# Patient Record
Sex: MALE | Race: WHITE
[De-identification: names, ages, dates, MRNs, and addresses within clinical notes are randomized per-mention and may not be internally consistent; named-entity substitution may affect disease eponyms.]

---

## 2019-02-08 ENCOUNTER — HOSPITAL ENCOUNTER (INPATIENT)
Dept: HOSPITAL 76 - ED | Age: 84
LOS: 3 days | Discharge: HOME | DRG: 390 | End: 2019-02-11
Attending: NURSE PRACTITIONER | Admitting: NURSE PRACTITIONER
Payer: MEDICARE

## 2019-02-08 DIAGNOSIS — K56.600: Primary | ICD-10-CM

## 2019-02-08 DIAGNOSIS — Z85.72: ICD-10-CM

## 2019-02-08 DIAGNOSIS — E87.6: ICD-10-CM

## 2019-02-08 DIAGNOSIS — K30: ICD-10-CM

## 2019-02-08 DIAGNOSIS — Z92.21: ICD-10-CM

## 2019-02-08 DIAGNOSIS — I10: ICD-10-CM

## 2019-02-08 DIAGNOSIS — E86.0: ICD-10-CM

## 2019-02-08 DIAGNOSIS — Z90.49: ICD-10-CM

## 2019-02-08 DIAGNOSIS — Z87.891: ICD-10-CM

## 2019-02-08 LAB
ALBUMIN DIAFP-MCNC: 3.7 G/DL (ref 3.2–5.5)
ALBUMIN/GLOB SERPL: 1.1 {RATIO} (ref 1–2.2)
ALP SERPL-CCNC: 76 IU/L (ref 42–121)
ALT SERPL W P-5'-P-CCNC: 16 IU/L (ref 10–60)
ANION GAP SERPL CALCULATED.4IONS-SCNC: 13 MMOL/L (ref 6–13)
AST SERPL W P-5'-P-CCNC: 25 IU/L (ref 10–42)
BASOPHILS NFR BLD AUTO: 0 10^3/UL (ref 0–0.1)
BASOPHILS NFR BLD AUTO: 0.4 %
BILIRUB BLD-MCNC: 0.6 MG/DL (ref 0.2–1)
BUN SERPL-MCNC: 22 MG/DL (ref 6–20)
CALCIUM UR-MCNC: 8.6 MG/DL (ref 8.5–10.3)
CHLORIDE SERPL-SCNC: 105 MMOL/L (ref 101–111)
CLARITY UR REFRACT.AUTO: CLEAR
CO2 SERPL-SCNC: 19 MMOL/L (ref 21–32)
CREAT SERPLBLD-SCNC: 0.9 MG/DL (ref 0.6–1.2)
EOSINOPHIL # BLD AUTO: 0 10^3/UL (ref 0–0.7)
EOSINOPHIL NFR BLD AUTO: 0.2 %
ERYTHROCYTE [DISTWIDTH] IN BLOOD BY AUTOMATED COUNT: 14.1 % (ref 12–15)
GFRSERPLBLD MDRD-ARVRAT: 81 ML/MIN/{1.73_M2} (ref 89–?)
GLOBULIN SER-MCNC: 3.5 G/DL (ref 2.1–4.2)
GLUCOSE SERPL-MCNC: 124 MG/DL (ref 70–100)
GLUCOSE UR QL STRIP.AUTO: NEGATIVE MG/DL
HGB UR QL STRIP: 13.6 G/DL (ref 14–18)
KETONES UR QL STRIP.AUTO: NEGATIVE MG/DL
LIPASE SERPL-CCNC: 18 U/L (ref 22–51)
LYMPHOCYTES # SPEC AUTO: 1 10^3/UL (ref 1.5–3.5)
LYMPHOCYTES NFR BLD AUTO: 11.2 %
MCH RBC QN AUTO: 31.5 PG (ref 27–31)
MCHC RBC AUTO-ENTMCNC: 33.6 G/DL (ref 32–36)
MCV RBC AUTO: 93.9 FL (ref 80–94)
MONOCYTES # BLD AUTO: 0.8 10^3/UL (ref 0–1)
MONOCYTES NFR BLD AUTO: 9.2 %
NEUTROPHILS # BLD AUTO: 7.1 10^3/UL (ref 1.5–6.6)
NEUTROPHILS # SNV AUTO: 9 X10^3/UL (ref 4.8–10.8)
NEUTROPHILS NFR BLD AUTO: 79 %
NITRITE UR QL STRIP.AUTO: NEGATIVE
PDW BLD AUTO: 6.9 FL (ref 7.4–11.4)
PH UR STRIP.AUTO: 5.5 PH (ref 5–7.5)
PLATELET # BLD: 318 10^3/UL (ref 130–450)
PROT SPEC-MCNC: 7.2 G/DL (ref 6.7–8.2)
PROT UR STRIP.AUTO-MCNC: NEGATIVE MG/DL
RBC # UR STRIP.AUTO: NEGATIVE /UL
RBC MAR: 4.3 10^6/UL (ref 4.7–6.1)
SODIUM SERPLBLD-SCNC: 137 MMOL/L (ref 135–145)
SP GR UR STRIP.AUTO: 1.01 (ref 1–1.03)
UROBILINOGEN UR QL STRIP.AUTO: (no result) E.U./DL
UROBILINOGEN UR STRIP.AUTO-MCNC: NEGATIVE MG/DL

## 2019-02-08 PROCEDURE — 80048 BASIC METABOLIC PNL TOTAL CA: CPT

## 2019-02-08 PROCEDURE — 83735 ASSAY OF MAGNESIUM: CPT

## 2019-02-08 PROCEDURE — 74177 CT ABD & PELVIS W/CONTRAST: CPT

## 2019-02-08 PROCEDURE — 83690 ASSAY OF LIPASE: CPT

## 2019-02-08 PROCEDURE — 74018 RADEX ABDOMEN 1 VIEW: CPT

## 2019-02-08 PROCEDURE — 96366 THER/PROPH/DIAG IV INF ADDON: CPT

## 2019-02-08 PROCEDURE — 99284 EMERGENCY DEPT VISIT MOD MDM: CPT

## 2019-02-08 PROCEDURE — 80053 COMPREHEN METABOLIC PANEL: CPT

## 2019-02-08 PROCEDURE — 87086 URINE CULTURE/COLONY COUNT: CPT

## 2019-02-08 PROCEDURE — 85025 COMPLETE CBC W/AUTO DIFF WBC: CPT

## 2019-02-08 PROCEDURE — 81001 URINALYSIS AUTO W/SCOPE: CPT

## 2019-02-08 PROCEDURE — 85027 COMPLETE CBC AUTOMATED: CPT

## 2019-02-08 PROCEDURE — 96376 TX/PRO/DX INJ SAME DRUG ADON: CPT

## 2019-02-08 PROCEDURE — 99283 EMERGENCY DEPT VISIT LOW MDM: CPT

## 2019-02-08 PROCEDURE — 36415 COLL VENOUS BLD VENIPUNCTURE: CPT

## 2019-02-08 PROCEDURE — 71046 X-RAY EXAM CHEST 2 VIEWS: CPT

## 2019-02-08 PROCEDURE — 81003 URINALYSIS AUTO W/O SCOPE: CPT

## 2019-02-08 PROCEDURE — 96375 TX/PRO/DX INJ NEW DRUG ADDON: CPT

## 2019-02-08 PROCEDURE — 84132 ASSAY OF SERUM POTASSIUM: CPT

## 2019-02-08 PROCEDURE — 96365 THER/PROPH/DIAG IV INF INIT: CPT

## 2019-02-08 PROCEDURE — 84100 ASSAY OF PHOSPHORUS: CPT

## 2019-02-08 RX ADMIN — HEPARIN SODIUM SCH UNIT: 5000 INJECTION, SOLUTION INTRAVENOUS; SUBCUTANEOUS at 21:24

## 2019-02-08 RX ADMIN — DIATRIZOATE MEGLUMINE AND DIATRIZOATE SODIUM ONE ML: 660; 100 LIQUID ORAL; RECTAL at 16:43

## 2019-02-08 RX ADMIN — SODIUM CHLORIDE AND POTASSIUM CHLORIDE SCH MLS/HR: 9; 1.49 INJECTION, SOLUTION INTRAVENOUS at 16:56

## 2019-02-08 RX ADMIN — DIATRIZOATE MEGLUMINE AND DIATRIZOATE SODIUM ONE ML: 660; 100 LIQUID ORAL; RECTAL at 16:44

## 2019-02-08 RX ADMIN — SODIUM CHLORIDE, PRESERVATIVE FREE SCH ML: 5 INJECTION INTRAVENOUS at 16:56

## 2019-02-08 RX ADMIN — ONDANSETRON PRN MG: 2 INJECTION INTRAMUSCULAR; INTRAVENOUS at 20:28

## 2019-02-08 RX ADMIN — HYDROMORPHONE HYDROCHLORIDE PRN MG: 1 INJECTION, SOLUTION INTRAMUSCULAR; INTRAVENOUS; SUBCUTANEOUS at 18:11

## 2019-02-08 NOTE — HISTORY & PHYSICAL EXAMINATION
Chief Complaint





- Chief Complaint


Chief Complaint: abdominal pain





History of Present Illness





- Admitted From


Admitted From:: Home





- History Obtained From


Records Reviewed: Yes in [facility]


History obtained from: Patient and medical recods


Exam Limitations: No





- History of Present Illness


HPI Comment/Other: 


Mr Iqbal is a 84 yo male with a past medical history of history of stage 4 

diffuse large B-cell lymphoma diagnosed in 2012 after he presented with a small 

bowel obstruction and required right colectomy including resection of his 

terminal ileum where the tumor was apparently located. He then receive 6 cycles 

of chemotherapy. His latest CT scan for surveillance in 2014 showed no evidence 

of disease. He also has a history of hypertension and hypokalemia. He has not 

had any hospitalizations or ER visits for obstructions after surgery. He drove 

himself to the ER today with complaints over the last 10 days to 2 weeks of 

abdominal cramps. He denies nausea. He reports passing a little gas yesterday 

and he had a small bowel movement today. Last normal bowel movement was 

yesterday. He denies melena or BRBPR. He states that he is hungry. CT abd/pelvis

in the ER shows markedly dilated small bowel with air fluid levels and a  

decompressed large bowel distal to his anastamosis. There is no evidence of 

recurrent cancer. 





He has noted wt loss of approximately 10 lbs over the last two years. He denies 

fevers or chills, chest pain, or urinary symptoms. He does endorse dyspnea on 

exertion recently, specially 2-3 days ago when he was helping his daughter feed 

some of the animals on their properties. He denies orthopnea or dyspnea at rest.





He is being admitted to the hospital for observation to include bowel rest, IV 

fluids, potassium repletion, and undergo a gastrografin challenge per the 

general surgeon.





History





- Past Medical History


Cardiovascular: reports: Hypertension


Respiratory: reports: None


Neuro: reports: None


Endocrine/Autoimmune: reports: None


GI: reports: Other (lymphoma involving terminal ileum 2012)


: reports: None


Psych: reports: Depression


Musculoskeletal: reports: None


MRSA Hx?: No





- Past Surgical History


General: reports: Bowel surgery (right colectomy), Colonoscopy





- Family & Social History


Living arrangement: At home


Living Situation: With family


Social History Notes: Patient lives with his daughter and her . He is 

independent at home and still drives. He is  and has two daughters with 

multiple grandchildren and great-grandchildren. 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





- Substance History


Use: Uses substance without health or social issues: Tobacco (x 65 yrs; quit 2 

weeks ago.), Cannabis (2-3 times per day)


Tobacco Details: Cigarettes





- POLST


Patient has POLST: No





Meds/Allgy





- Home Medications


Home Medications: 


                                Ambulatory Orders











 Medication  Instructions  Recorded  Confirmed


 


Aspirin 81 mg PO DAILY 07/10/13 05/07/18


 


Multivitamin [Multivitamins] 1 each PO DAILY 07/10/13 05/07/18


 


hydroCHLOROthiazide 12.5 mg PO DAILY 05/30/14 05/07/18





[Hydrochlorothiazide]   


 


Potassium Chloride [Micro-K] 10 meq PO DAILY 10/27/14 05/07/18














- Allergies


Allergies/Adverse Reactions: 


                                    Allergies











Allergy/AdvReac Type Severity Reaction Status Date / Time


 


No Known Drug Allergies Allergy   Verified 02/08/19 10:24














Review of Systems





- Constitutional


Constitutional: reports: Weight loss (10 pound weight loss over the last 2 year

s, no recent weight loss.).  denies: Weakness, Night sweats





- Eyes


Eyes: reports: Corrective lenses





- Ears, Nose & Throat


Ears, Nose & Throat: reports: Dentures





- Cardiovascular


Cariovascular: denies: Irregular heart rate, Palpitations, Chest pain





- Respiratory


Respiratory: reports: SOB with exertion.  denies: Cough, Sputum production, 

Wheezing, SOB at rest





- Gastrointestinal


Gastrointestinal: reports: Abdominal pain, Abdominal distention, Constipation, 

Change in bowel habits.  denies: Nausea, Vomiting





- Genitourinary


Genitourinary: denies: Dysuria, Frequency, Urgency





- Musculoskeletal


Musculoskeletal: denies: Muscle pain, Back pain





- Neurological


Neurological: denies: General weakness, Headache, Memory problems





- Psychiatric


Psychiatric: denies: Depression, Anxiety





- All Other Systems


All Other Systems: reports: Reviewed and negative


Prior Level of Functionality: 


Patient is indepedent.





Exam





- Vital Signs


Reviewed Vital Signs: Yes


Vital Signs: 





                                Vital Signs x48h











  Temp Pulse Pulse Resp BP BP Pulse Ox


 


 02/08/19 16:00  36.4 C L   71  18   121/104 H  100


 


 02/08/19 15:01   67   18  109/75   99


 


 02/08/19 14:09   69   17  159/95 H   100


 


 02/08/19 12:30   89   18  120/68   99


 


 02/08/19 10:35     18   


 


 02/08/19 10:21  36.7 C  98   20  117/80   99














- Physical Exam


General Appearance: positive: No acute distress, Alert


Eyes Bilateral: positive: Normal inspection, Other (Wears glasses.)


ENT: positive: ENT inspection nml, Pharynx nml, Dry mucous membranes, Other 

(Upper and lower dentures in place)


Neck: positive: Nml inspection, No JVD


Respiratory: positive: No respiratory distress, Breath sounds nml


Cardiovascular: positive: Regular rate & rhythm, No murmur


Peripheral Pulses: positive: 2+


Abdomen: positive: Other (Abdomen is distended, tender to palpation, bowel 

sounds are hypoactive).  negative: Guarding, Rebound


Skin: positive: Warm, Dry, Other (He has a bandaid to right temporal region. 

States a horse kicked him.)


Extremities: positive: Non-tender, Full ROM, No pedal edema


Neurologic/Psychiatric: positive: Oriented x3, CN's nml (2-12), Motor nml, 

Sensation nml





Conclusion/Plan





- Problem List


(1) Partial small bowel obstruction


Conclusion/Plan: 


Patient is not nauseous at this time. In fact, he reports that he is hungry. 

General surgery has been consulted.


Plan: 


admit for observation 


NPO with bowel rest


hydration with IV fluids


gastrografin challenge per general surgeon. follow-up xrays have been ordered.


if patient develops n/v, will place NGT for decompression


await return of bowel function








(2) Dehydration


Conclusion/Plan: 


Secondary to above. BUN 22. 


Plan: 


continue IVF. 


obtain BMP in the morning.








(3) Hypokalemia


Conclusion/Plan: 


Patient with h/o hypokalemia on supplementation at home. K 2.9 today. He 

received 10 mEq in the ER.


Plan: 


replete potassium. 


add potassium to IV fluids. 


obtain BMP in the morning.








(4) Hypertension


Conclusion/Plan: 


Patient on HCTZ at home, although he reports not taking his medication for some 

time. 


Plan: 


monitor blood pressure. 


hydralazine IV PRN ordered for SBP >160 since he is currently NPO.


Qualifiers: 


   Hypertension type: essential hypertension   Qualified Code(s): I10 - 

Essential (primary) hypertension   





(5) Full code status


Conclusion/Plan: 


Patient wishes to be a full code.








- Lab Results


Lab results reviewed: Yes


Fish Bones: 


                                 02/08/19 08:43





                                 02/08/19 08:43





- Diagnostic Imaging Results


Diagnostic Imaging Results: positive: Final report reviewed (CT scan abdomen/pe

lvis 2/8/2019 -- Small bowel obstruction to the level of the surgical 

anastomotic staple line right lower quadrant, presumably a large bowel to small 

bowel anastomosis.)





Core Measures





- Anticipated LOS


I expect patient to be DC'd or transferred within 96 hours.: Yes





- DVT/VTE - Prophylaxis


VTE/DVT Prophylaxis med ordered at admit?: Yes

## 2019-02-08 NOTE — ED PHYSICIAN DOCUMENTATION
PD HPI ABD PAIN





- Stated complaint


Stated Complaint: STOMACH PAIN





- Chief complaint


Chief Complaint: Abd Pain





- History obtained from


History obtained from: Patient





- History of Present Illness


Timing - duration: Days (4)


Timing - details: Waxing and waning


Quality: Pain


Location: Periumbilical


Similar symptoms before: Diagnosis (Bowel obstruction)





- Additional information


Additional information: 





The patient is an 83-year-old male who presents with upper abdominal pain that 

he has had intermittently for the past 2 or 3 weeks, but worse over the past 4 

days.  He has been belching, but denies nausea or vomiting.  He denies fever, 

diarrhea, or dysuria.  He has been having bowel movements.  He denies chest 

pain, cough, or shortness of breath.  His past medical history is significant 

for stage IV diffuse large B-cell lymphoma.  He underwent emergent bowel surgery

for bowel obstruction years ago.  He reports that his pain today feels similar 

to the pain he experienced prior to the bowel surgery.


Review of his medical record reveals that he received chemotherapy in 2012.  He 

underwent CT scan in April 2014 that was negative for recurrent lymphoma.





Review of Systems


Constitutional: denies: Fever


Ears: denies: Tinnitus/ringing


Nose: denies: Congestion


Throat: denies: Sore throat


Cardiac: denies: Chest pain / pressure


Respiratory: denies: Dyspnea, Cough


GI: reports: Abdominal Pain.  denies: Nausea, Vomiting, Diarrhea


: denies: Dysuria


Skin: denies: Rash


Musculoskeletal: denies: Back pain, Extremity swelling


Neurologic: denies: Focal weakness, Numbness, Headache





PD PAST MEDICAL HISTORY





- Past Medical History


Cardiovascular: Hypertension


Respiratory: None


Endocrine/Autoimmune: None


GI: Other


: None


Musculoskeletal: None





- Past Surgical History


Past Surgical History: Yes


General: Bowel surgery, Colonoscopy





- Present Medications


Home Medications: 


                                Ambulatory Orders











 Medication  Instructions  Recorded  Confirmed


 


Aspirin 81 mg PO DAILY 07/10/13 05/07/18


 


Multivitamin [Multivitamins] 1 each PO DAILY 07/10/13 05/07/18


 


hydroCHLOROthiazide 12.5 mg PO DAILY 05/30/14 05/07/18





[Hydrochlorothiazide]   


 


Potassium Chloride [Micro-K] 10 meq PO DAILY 10/27/14 05/07/18














- Allergies


Allergies/Adverse Reactions: 


                                    Allergies











Allergy/AdvReac Type Severity Reaction Status Date / Time


 


No Known Drug Allergies Allergy   Verified 02/08/19 10:24














- Social History


Does the pt smoke?: Yes


Smoking Status: Former smoker


Does the pt drink ETOH?: No


Does the pt have substance abuse?: No





- Immunizations


Immunizations are current?: No





PD ED PE NORMAL





- Vitals


Vital signs reviewed: Yes (Normal)





- General


General: Alert and oriented X 3, Well developed/nourished





- HEENT


HEENT: Atraumatic, Pharynx benign, Other (Slightly dry mucous membranes.)





- Neck


Neck: Supple, no meningeal sign, No adenopathy, No JVD





- Cardiac


Cardiac: RRR





- Respiratory


Respiratory: No respiratory distress, Clear bilaterally





- Abdomen


Abdomen: Soft, Other (Decreased bowel tones, distended abdomen that is soft, 

without focal tenderness or rebound.)





- Back


Back: No CVA TTP





- Derm


Derm: No rash





- Extremities


Extremities: No edema, No calf tenderness / cord





- Neuro


Neuro: Alert and oriented X 3, No motor deficit, Normal speech





Results





- Vitals


Vitals: 


                               Vital Signs - 24 hr











  02/08/19 02/08/19 02/08/19





  10:21 10:35 12:30


 


Temperature 36.7 C  


 


Heart Rate 98  89


 


Respiratory 20 18 18





Rate   


 


Blood Pressure 117/80  120/68


 


O2 Saturation 99  99














  02/08/19 02/08/19





  14:09 15:01


 


Temperature  


 


Heart Rate 69 67


 


Respiratory 17 18





Rate  


 


Blood Pressure 159/95 H 109/75


 


O2 Saturation 100 99








                                     Oxygen











O2 Source                      Room air

















- Labs


Labs: 


                                Laboratory Tests











  02/08/19 02/08/19 02/08/19





  08:43 08:43 12:28


 


WBC  9.0  


 


RBC  4.30 L  


 


Hgb  13.6 L  


 


Hct  40.4 L  


 


MCV  93.9  


 


MCH  31.5 H  


 


MCHC  33.6  


 


RDW  14.1  


 


Plt Count  318  


 


MPV  6.9 L  


 


Neut # (Auto)  7.1 H  


 


Lymph # (Auto)  1.0 L  


 


Mono # (Auto)  0.8  


 


Eos # (Auto)  0.0  


 


Baso # (Auto)  0.0  


 


Absolute Nucleated RBC  0.00  


 


Nucleated RBC %  0.0  


 


Sodium   137 


 


Potassium   2.9 L 


 


Chloride   105 


 


Carbon Dioxide   19 L 


 


Anion Gap   13.0 


 


BUN   22 H 


 


Creatinine   0.9 


 


Estimated GFR (MDRD)   81 L 


 


Glucose   124 H 


 


Calcium   8.6 


 


Total Bilirubin   0.6 


 


AST   25 


 


ALT   16 


 


Alkaline Phosphatase   76 


 


Total Protein   7.2 


 


Albumin   3.7 


 


Globulin   3.5 


 


Albumin/Globulin Ratio   1.1 


 


Lipase   18 L 


 


Urine Color    YELLOW


 


Urine Clarity    CLEAR


 


Urine pH    5.5


 


Ur Specific Gravity    1.015


 


Urine Protein    NEGATIVE


 


Urine Glucose (UA)    NEGATIVE


 


Urine Ketones    NEGATIVE


 


Urine Occult Blood    NEGATIVE


 


Urine Nitrite    NEGATIVE


 


Urine Bilirubin    NEGATIVE


 


Urine Urobilinogen    1 (NORMAL)


 


Ur Leukocyte Esterase    NEGATIVE


 


Ur Microscopic Review    NOT INDICATED


 


Urine Culture Comments    NOT INDICATED














- Rads (name of study)


  ** CT abd/pelvis


Radiology: Prelim report reviewed, EMP read contemporaneously, See rad report 

(Small bowel obstruction to the level of the surgical anastomotic staple line 

right lower quadrant, presumably a large bowel to small bowel anastomosis.)





PD MEDICAL DECISION MAKING





- ED course


Complexity details: reviewed old records, reviewed results, re-evaluated 

patient, considered differential, d/w patient, d/w consultant


ED course: 





The patient's presentation is consistent with partial small bowel obstruction, 

which is evident on CT scan of the abdomen and pelvis.  He does not appear 

septic, and his white count is normal at 4.9.  Chemistry panel reveals 

hypokalemia with a potassium of 2.9.  Urinalysis is negative.


Treatment in the emergency department included administration of normal saline 

IV, and potassium 10 mEq IV.  He declined pain medication, and did not need 

antiemetic.  I discussed his condition with Dr. Almeida who will consult for 

surgery, but suggested that medicine should be the admitting service.  I 

discussed his condition with Dr. Willard who accepts him for further evaluation 

and treatment.





Departure





- Departure


Disposition: ED Place in Observation


Clinical Impression: 


 Partial small bowel obstruction, Hyperkalemia





Condition: Stable

## 2019-02-08 NOTE — XRAY REPORT
Reason:  hx stage 4 lymphoma, longstanding tobacco usage

Procedure Date:  02/08/2019   

Accession Number:  [id number] / [id number]                    

Procedure:  XR  - Chest 2 View X-Ray CPT Code:  80823

 

FULL RESULT:

 

 

EXAM:

CHEST RADIOGRAPHY

 

EXAM DATE: 2/8/2019 08:00 PM.

 

CLINICAL HISTORY: Lymphoma.

 

COMPARISON: XR CHEST PA AND LAT 05/26/2012 8:01 AM.

 

TECHNIQUE: 2 views.

 

FINDINGS:

Lungs/Pleura: No focal opacities evident. No pleural effusion. No 

pneumothorax. Normal volumes.

 

Mediastinum: Normal heart size. Tortuous aorta.

 

Other: Prominent air-filled bowel with numerous air-fluid levels. Mild 

chronic compression fractures. Old lateral left sixth rib fracture.

IMPRESSION: Normal cardiomediastinal silhouette and clear lungs.

 

RADIA

## 2019-02-08 NOTE — XRAY REPORT
Reason:  sbo, initial film after gastrograffin challenge

Procedure Date:  02/08/2019   

Accession Number:  [id number] / [id number]                    

Procedure:  XR  - Abdomen 1 View X-Ray CPT Code:  75306

 

FULL RESULT:

 

 

EXAM:

ABDOMEN RADIOGRAPHY

 

EXAM DATE: 2/8/2019 04:24 PM.

 

CLINICAL HISTORY: Prior history of right colectomy for bowel lymphoma 

with appearance of small bowel obstruction on CT earlier today. 

Gastrografin challenge.

 

COMPARISON: X-ray abdomen AP (KUB) 05/17/2012 12:44 PM.

Abdomen/pelvis with contrast 02/08/2019 12:14 PM.

 

TECHNIQUE: 1 view. Per protocol, patient was given 100 cc of full 

strength Gastrografin PO followed by immediate KUB.

 

FINDINGS:

Bowel Gas Pattern: Moderate hiatal hernia superimposes behind the heart. 

There is Gastrografin within nondistended stomach and nondistended 

proximal to mid jejunum on the immediate film. There are multiple dilated 

distal small bowel loops which have not yet opacified.

 

Other: There is persistent contrast within the bilateral renal collecting 

systems from recent IV contrast with mild hydronephrosis on the right. 

Persistent contrast within moderately dilated urinary bladder..

 

IMPRESSION:

1. Persistent evidence of abnormal bowel gas pattern with multiple 

dilated loops of small bowel.

2. Immediate Gastrografin challenge shows nondistended stomach and 

proximal mid jejunum.

3. Mildly distended urinary bladder. Mild right-sided hydronephrosis.

 

RADIA

## 2019-02-08 NOTE — CONSULTATION NOTE
Referring Provider


Name of Referring Provider:: Dr. Fox


Consult Date: 02/08/19





Chief Complaint





- Chief Complaint


Chief Complaint: abd pain





History of Present Illness





- Admitted From


Admitted From:: ER





- History Obtained From


Records Reviewed: yes


History obtained from: pt, records


Exam Limitations: none





- History of Present Illness


HPI Comment/Other: 





84 yo male with hx of Stage 4 diffuse large cell lymphoma diagnosed in 2012 when

he presented with a small bowel obstruction and required  right colectomy 

including resection of his terminal ileum where tumor was apparently located. He

then receive 6 cycles of chemotherapy and has been ANNE ever since. He did well 

until approximately 2 weeks ago when he noticed increasing fatigue and RAMESH, 

followed by the onset of crampy periumbilical pains approximately 4 days ago, 

which worsened to the point of him presenting to the ER for evaluation. There 

has been no N/V, fever, chills. He has noted wt loss of approximately 10 lbs 

over the past few years. He reports normal daily bowel movements until today 

when he has noted no passage of gas or stool per rectum. No melena, BRBPR. 

Current sx are similar to those he had in 2012 prior to his partial colectomy. 

He report no unusual oral intake recently. No other prior abdominal surgeries. 

CT abd/pelvis in the ER shows markedly dilated small bowel with A/F levels, with

some gas and stool in nl sized colon, suggestive of high grade partial SBO 

located near surgical anastomosis. There is no evidence of recurrent cancer. Pt 

was admitted and surgical consultation was requested.





History





- Past Medical History


Cardiovascular: reports: Hypertension


Respiratory: reports: None


Endocrine/Autoimmune: reports: None


GI: reports: Other (lymphoma involving terminal ileum 2012)


: reports: None


Musculoskeletal: reports: None


MRSA Hx?: No





- Past Surgical History


General: reports: Bowel surgery (right colectomy), Colonoscopy





- Family & Social History


Living arrangement: At home


Living Situation: With family





- Substance History


Use: Uses substance without health or social issues: Tobacco (x 65 yrs; quit 2 

weeks ago.), Cannabis (2-3 times per day)


Tobacco Details: Cigarettes





Meds/Allgy





- Home Medications


Home Medications: 


                                Ambulatory Orders











 Medication  Instructions  Recorded  Confirmed


 


Aspirin 81 mg PO DAILY 07/10/13 05/07/18


 


Multivitamin [Multivitamins] 1 each PO DAILY 07/10/13 05/07/18


 


hydroCHLOROthiazide 12.5 mg PO DAILY 05/30/14 05/07/18





[Hydrochlorothiazide]   


 


Potassium Chloride [Micro-K] 10 meq PO DAILY 10/27/14 05/07/18














- Allergies


Allergies/Adverse Reactions: 


                                    Allergies











Allergy/AdvReac Type Severity Reaction Status Date / Time


 


No Known Drug Allergies Allergy   Verified 02/08/19 10:24














Review of Systems





- Constitutional


Constitutional: reports: Fatigue, Weakness, Weight loss.  denies: Fever, Chills,

Poor appetite, Diaphoresis, Night sweats





- Gastrointestinal


Gastrointestinal: reports: Abdominal pain, Abdominal distention, Change in bowel

habits, Bloating.  denies: Diarrhea, Rectal bleeding, Black stools, Bloody 

stools, Nausea, Vomiting, Bile emesis, Elijah blood emesis, Coffee grounds 

emesis, Reflux/heartburn, Poor appetite





Exam





- Vital Signs


Reviewed Vital Signs: Yes


Vital Signs: 





                                Vital Signs x48h











  Temp Pulse Resp BP Pulse Ox


 


 02/08/19 15:01   67  18  109/75  99


 


 02/08/19 14:09   69  17  159/95 H  100


 


 02/08/19 12:30   89  18  120/68  99


 


 02/08/19 10:35    18  


 


 02/08/19 10:21  36.7 C  98  20  117/80  99














- Physical Exam


General Appearance: positive: Alert, Mild distress


Eyes Bilateral: positive: No scleral icterus, Other (anisocoria, pt reports due 

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


ENT: positive: Dry mucous membranes


Neck: positive: Nml inspection, No JVD.  negative: Lymphadenopathy (R), 

Lymphadenopathy (L)


Respiratory: positive: Chest non-tender, No respiratory distress, Breath sounds 

nml.  negative: Wheezes, Rales, Rhonchi


Cardiovascular: positive: Regular rate & rhythm, No murmur, No gallop


Abdomen: positive: Non-tender, Abnml bowel sounds (high pitched, hyperactive).  

negative: No distention (moderate distention), Guarding, Rebound, Hepatomegaly, 

Splenomegaly, Mass


Skin: positive: No rash, Warm, Dry.  negative: Cyanosis


Extremities: positive: Nml appearance, No pedal edema.  negative: Calf 

tenderness


Neurologic/Psychiatric: positive: Oriented x3





Conclusion/Plan





- Diagnosis


Diagnosis: High grade partial SBO, most likely due to adhesions; recurrent 

neoplasm not evident on imaging studies so less likely. Also less likely would 

be ileus due to hypokalemia. There is no evidence of an acute surgical abdomen 

or complete SBO or bowel ischemia at this time.  Mild volume depletion secondary

to same.





- Plan


Plan: 





Agree with admission, bowel rest, observation, serial abdominal exams and xrays.

Will order a gastrograffin challenge test, which may be therapeutic as well as 

diagnostic. Replace potassium. Will follow. Thanks,





- Lab Results


Lab results reviewed: Yes


Fish Bones: 


                                 02/08/19 08:43





                                 02/08/19 08:43


Other Lab Results: 





lfts, lipase nl





- Diagnostic Imaging Results


Diagnostic Imaging Results: positive: Final report reviewed, Discussed with 

radiologist, Read independently


Diagnostic Imaging Results Comments: 





See HPI

## 2019-02-08 NOTE — XRAY REPORT
Reason:  f/u gastrograffin challenge

Procedure Date:  02/08/2019   

Accession Number:  [id number] / [id number]                    

Procedure:  XR  - Abdomen 1 View X-Ray CPT Code:  36864

 

FULL RESULT:

 

 

EXAM:

ABDOMEN RADIOGRAPHY

 

EXAM DATE: 2/8/2019 08:00 PM.

 

CLINICAL HISTORY: 4 hours post Gastrografin ingestion.

 

COMPARISON: ABDOMEN 1 VIEW 02/08/2019 4:17 PM.

 

TECHNIQUE: 1 view.

 

FINDINGS:

Bowel Gas Pattern: Contrast in dilated proximal small bowel loops.

 

Other: Contrast in the bladder and right upper collecting system.

 

IMPRESSION: Suspect mid small bowel obstruction.

 

[facility]

## 2019-02-08 NOTE — CT REPORT
Reason:  Abdominal pain with distention; h/o lymphoma.

Procedure Date:  02/08/2019   

Accession Number:  [id number] / [id number]                    

Procedure:  CT  - Abdomen/Pelvis W/ CPT Code:  

 

FULL RESULT:

 

 

EXAM:

CT ABDOMEN AND PELVIS

 

EXAM DATE: 2/8/2019 12:17 PM.

 

CLINICAL HISTORY: Abdominal pain with distention, history of lymphoma.

 

COMPARISONS: CHEST W/ 04/14/2014 10:50 AM.

 

TECHNIQUE: Routine helical CT imaging was performed through the abdomen 

and pelvis. IV contrast: Opti 320  100mL. Enteric contrast: No. 

Reconstructions: Coronal and sagittal.

 

In accordance with CT protocol optimization, one or more of the following 

dose reduction techniques were utilized for this exam: automated exposure 

control, adjustment of mA and/or KV based on patient size, or use of 

iterative reconstructive technique.

 

FINDINGS:

Lung Bases: Unremarkable.

 

Liver: No suspicious solid masses. Stable pattern of several cysts noted 

in the right and left lobes compared to 2014. Hepatic and portal veins 

are patent.

 

Gallbladder/Bile Ducts: Unremarkable.

 

Spleen: Normal.

 

Pancreas: Normal.

 

Adrenal Glands: Normal.

 

Kidneys: Normal. No masses or hydronephrosis.

 

Peritoneal Cavity/Bowel: There is a surgical staple line in the right 

lower quadrant likely due to partial colectomy. There is a markedly 

abnormal bowel gas pattern with prominent dilatation of loops of small 

bowel throughout the abdomen with fecalization of the bowel gas pattern. 

Small bowel dilatation is mostly less than 5 cm; however, at the level of 

the anastomotic staple line is dilated upwards of 8 cm. Large bowel 

distal to the anastomosis is decompressed. No free air or significant 

ascites. No appreciable significant adenopathy.

 

Pelvic Organs: Urinary bladder is dilated. No specific abnormalities of 

the other pelvic organs.

 

Vasculature: Atherosclerotic deposition is noted in the aorta and iliac 

vessels without aneurysmal dilatation.

 

Bones: Stable degenerative disk changes and osteoporotic endplate 

compressions.

 

Other: None.

IMPRESSION: Small bowel obstruction to the level of the surgical 

anastomotic staple line right lower quadrant, presumably a large bowel to 

small bowel anastomosis; correlate with surgical history.

 

RADIA

## 2019-02-09 LAB
ANION GAP SERPL CALCULATED.4IONS-SCNC: 7 MMOL/L (ref 6–13)
BASOPHILS NFR BLD AUTO: 0 %
BASOPHILS NFR BLD AUTO: 0 10^3/UL (ref 0–0.1)
BUN SERPL-MCNC: 26 MG/DL (ref 6–20)
CALCIUM UR-MCNC: 8 MG/DL (ref 8.5–10.3)
CHLORIDE SERPL-SCNC: 112 MMOL/L (ref 101–111)
CO2 SERPL-SCNC: 21 MMOL/L (ref 21–32)
CREAT SERPLBLD-SCNC: 1 MG/DL (ref 0.6–1.2)
EOSINOPHIL # BLD AUTO: 0 10^3/UL (ref 0–0.7)
EOSINOPHIL NFR BLD AUTO: 0 %
ERYTHROCYTE [DISTWIDTH] IN BLOOD BY AUTOMATED COUNT: 14.1 % (ref 12–15)
GFRSERPLBLD MDRD-ARVRAT: 72 ML/MIN/{1.73_M2} (ref 89–?)
GLUCOSE SERPL-MCNC: 119 MG/DL (ref 70–100)
HGB UR QL STRIP: 12.4 G/DL (ref 14–18)
LYMPHOCYTES # SPEC AUTO: 0.5 10^3/UL (ref 1.5–3.5)
LYMPHOCYTES NFR BLD AUTO: 3.5 %
MAGNESIUM SERPL-MCNC: 1.8 MG/DL (ref 1.7–2.8)
MCH RBC QN AUTO: 31.9 PG (ref 27–31)
MCHC RBC AUTO-ENTMCNC: 33.8 G/DL (ref 32–36)
MCV RBC AUTO: 94.3 FL (ref 80–94)
MONOCYTES # BLD AUTO: 0.4 10^3/UL (ref 0–1)
MONOCYTES NFR BLD AUTO: 3.3 %
NEUTROPHILS # BLD AUTO: 12 10^3/UL (ref 1.5–6.6)
NEUTROPHILS # SNV AUTO: 12.9 X10^3/UL (ref 4.8–10.8)
NEUTROPHILS NFR BLD AUTO: 93.2 %
PDW BLD AUTO: 6.7 FL (ref 7.4–11.4)
PHOSPHATE BLD-MCNC: 3.4 MG/DL (ref 2.5–4.6)
PLATELET # BLD: 248 10^3/UL (ref 130–450)
RBC MAR: 3.88 10^6/UL (ref 4.7–6.1)
SODIUM SERPLBLD-SCNC: 140 MMOL/L (ref 135–145)

## 2019-02-09 RX ADMIN — SODIUM CHLORIDE AND POTASSIUM CHLORIDE SCH MLS/HR: 9; 1.49 INJECTION, SOLUTION INTRAVENOUS at 19:46

## 2019-02-09 RX ADMIN — SODIUM CHLORIDE, PRESERVATIVE FREE SCH: 5 INJECTION INTRAVENOUS at 14:28

## 2019-02-09 RX ADMIN — HYDROMORPHONE HYDROCHLORIDE PRN MG: 1 INJECTION, SOLUTION INTRAMUSCULAR; INTRAVENOUS; SUBCUTANEOUS at 17:54

## 2019-02-09 RX ADMIN — SODIUM CHLORIDE, PRESERVATIVE FREE SCH ML: 5 INJECTION INTRAVENOUS at 17:54

## 2019-02-09 RX ADMIN — SODIUM CHLORIDE, PRESERVATIVE FREE SCH ML: 5 INJECTION INTRAVENOUS at 00:41

## 2019-02-09 RX ADMIN — ONDANSETRON PRN MG: 2 INJECTION INTRAMUSCULAR; INTRAVENOUS at 02:19

## 2019-02-09 RX ADMIN — SODIUM CHLORIDE AND POTASSIUM CHLORIDE SCH MLS/HR: 9; 1.49 INJECTION, SOLUTION INTRAVENOUS at 04:07

## 2019-02-09 RX ADMIN — HYDROMORPHONE HYDROCHLORIDE PRN MG: 1 INJECTION, SOLUTION INTRAMUSCULAR; INTRAVENOUS; SUBCUTANEOUS at 00:40

## 2019-02-09 RX ADMIN — HEPARIN SODIUM SCH UNIT: 5000 INJECTION, SOLUTION INTRAVENOUS; SUBCUTANEOUS at 09:52

## 2019-02-09 RX ADMIN — SODIUM CHLORIDE AND POTASSIUM CHLORIDE SCH: 9; 1.49 INJECTION, SOLUTION INTRAVENOUS at 12:18

## 2019-02-09 RX ADMIN — HEPARIN SODIUM SCH UNIT: 5000 INJECTION, SOLUTION INTRAVENOUS; SUBCUTANEOUS at 20:53

## 2019-02-09 RX ADMIN — HYDROMORPHONE HYDROCHLORIDE PRN MG: 1 INJECTION, SOLUTION INTRAMUSCULAR; INTRAVENOUS; SUBCUTANEOUS at 20:53

## 2019-02-09 NOTE — XRAY REPORT
Reason:  f/u gastrograffin challenge

Procedure Date:  02/09/2019   

Accession Number:  [id number] / [id number]                    

Procedure:  XR  - Abdomen 1 View X-Ray CPT Code:  66802

 

FULL RESULT:

 

 

EXAM:

ABDOMEN RADIOGRAPHY

 

EXAM DATE: 2/9/2019 04:39 AM.

 

CLINICAL HISTORY: Gastrografin study followup.

 

COMPARISON: ABDOMEN 1 VIEW 02/08/2019 8:00 PM

ABDOMEN/PELVIS W/ 02/08/2019 12:14 PM

CHEST 2 VIEW 02/08/2019 8:00 PM.

 

TECHNIQUE: 1 view.

 

FINDINGS:

FINDINGS

IMPRESSION:

 

1. Multiple severely dilated small bowel loops are again seen, consistent 

with high-grade bowel obstruction. Oral contrast material has faintly 

distributed through the bowel loops. Small amount of contrast material 

within the stomach.

2. Small hiatal hernia suspected. There is small right basilar airspace 

disease.

3. 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 

diverticula.

RADIA

## 2019-02-09 NOTE — PROVIDER PROGRESS NOTE
Assessment/Plan





- Problem List


(1) Partial small bowel obstruction


Assessment/Plan: 


improving following gastrograffin challenge; rec: start clear liquid diet; would

advance diet very cautiously given marked dilatation of small bowel still 

present;  increase activity/OOB.








- Current Meds


Current Meds: 





                               Current Medications











Generic Name Dose Route Start Last Admin





  Trade Name Freq  PRN Reason Stop Dose Admin


 


Heparin Sodium (Porcine)  5,000 unit  02/08/19 21:00  02/09/19 09:52





    SUBQ   5,000 unit





  BID LILLIANA   Administration





     





     





     





     


 


Hydromorphone HCl  1 mg  02/08/19 17:45  02/09/19 00:40





  Dilaudid Inj Carp  IVP   1 mg





  Q4HR PRN   Administration





  PAIN   





     





     





     


 


Potassium Chloride 40 meq/  500 mls @ 125 mls/hr  02/09/19 06:34  02/09/19 06:55





  Sodium Chloride  IV  02/09/19 10:33  125 mls/hr





  ONCE ONE   Administration





     





     





     





     


 


Ondansetron HCl  4 mg  02/08/19 15:12  02/09/19 02:19





  Zofran Inj  IVP   4 mg





  Q6HR PRN   Administration





  Nausea / Vomiting   





     





     





     


 


Sodium Chloride  10 ml  02/08/19 17:00  02/09/19 00:41





  Normal Saline Flush 0.9%  IVP   10 ml





  0100,0900,1700 LILLIANA   Administration





     





     





     





     














- Lab Result


Fish Bone Diagrams: 


                                 02/08/19 08:43





                                 02/09/19 05:47





- Diagnostic Imaging Results


Diagnostic Imaging Results: Final report reviewed, Read independently


Diagnostic Imaging Results Comments: 





plain films from immediately following gastrograffin ingestion, at 4 hours 

(8PM), 13 hours (5AM) all show dilated loops of small bowel c/w high grade 

partial SBO.





- Additional Planning


Condition/Complexity: Improved


My Orders: 





My Active Orders





02/09/19 10:04


Ns W/20 Meq KCl [Normal Saline 0.9% W/20 Meq KCl] 1,000 ml IV 80 mls/hr 





02/09/19 Breakfast


Clear Liquid Diet [DIET] 





02/10/19 05:00


CBC - COMP BLD CT W/AUTO DIFF [HEME] DAILYLAB 


CMP [COMPREHENSIVE METABOLIC PANEL] [CHEM] DAILYLAB 














Subjective





- Subjective


Patient Reports: Feeling Better (pain markedly improved; passing flatus and 

multiple liquid and solid stools overnight; hungry)





Objective


Vital Signs: 





                               Vital Signs - 24 hr











  02/08/19 02/08/19 02/08/19





  10:21 10:35 12:30


 


Temperature 36.7 C  


 


Heart Rate 98  89


 


Heart Rate [   





Brachial]   


 


Respiratory 20 18 18





Rate   


 


Blood Pressure 117/80  120/68


 


Blood Pressure   





[Left Brachial   





artery]   


 


O2 Saturation 99  99














  02/08/19 02/08/19 02/08/19





  14:09 15:01 16:00


 


Temperature   36.4 C L


 


Heart Rate 69 67 


 


Heart Rate [   71





Brachial]   


 


Respiratory 17 18 18





Rate   


 


Blood Pressure 159/95 H 109/75 


 


Blood Pressure   121/104 H





[Left Brachial   





artery]   


 


O2 Saturation 100 99 100














  02/08/19 02/08/19 02/09/19





  21:08 23:40 08:00


 


Temperature  36.8 C 36.9 C


 


Heart Rate   


 


Heart Rate [  85 88





Brachial]   


 


Respiratory  18 20





Rate   


 


Blood Pressure   


 


Blood Pressure 115/75 114/71 118/73





[Left Brachial   





artery]   


 


O2 Saturation  94 94








                                     Oxygen











O2 Source                      Room air














I&O (Last 24 Hrs): 





                          Intake and Output Totals x24h











 02/07/19 02/08/19 02/09/19





 23:59 23:59 23:59


 


Intake Total  029.470 1649.667


 


Output Total  375 


 


Balance  51.666 1036.667











General: Alert, Oriented x3, Cooperative, No acute distress


Neck: No JVD


Neuro: Alert


Abdomen: Soft, No tenderness, Other (still distended with hyperactive, 

tympanitic bowel sounds, but no tenderness or guarding; possible visible loops 

of dilated small bowel.)


Extremities: No edema, No tenderness/swelling





- Results


Results: 





                               Laboratory Results











WBC  9.0 x10^3/uL (4.8-10.8)   02/08/19  08:43    


 


RBC  4.30 10^6/uL (4.70-6.10)  L  02/08/19  08:43    


 


Hgb  13.6 g/dL (14.0-18.0)  L  02/08/19  08:43    


 


Hct  40.4 % (42.0-52.0)  L  02/08/19  08:43    


 


MCV  93.9 fL (80.0-94.0)   02/08/19  08:43    


 


MCH  31.5 pg (27.0-31.0)  H  02/08/19  08:43    


 


MCHC  33.6 g/dL (32.0-36.0)   02/08/19  08:43    


 


RDW  14.1 % (12.0-15.0)   02/08/19  08:43    


 


Plt Count  318 10^3/uL (130-450)   02/08/19  08:43    


 


MPV  6.9 fL (7.4-11.4)  L  02/08/19  08:43    


 


Neut # (Auto)  7.1 10^3/uL (1.5-6.6)  H  02/08/19  08:43    


 


Lymph # (Auto)  1.0 10^3/uL (1.5-3.5)  L  02/08/19  08:43    


 


Mono # (Auto)  0.8 10^3/uL (0.0-1.0)   02/08/19  08:43    


 


Eos # (Auto)  0.0 10^3/uL (0.0-0.7)   02/08/19  08:43    


 


Baso # (Auto)  0.0 10^3/uL (0.0-0.1)   02/08/19  08:43    


 


Absolute Nucleated RBC  0.00 x10^3/uL  02/08/19  08:43    


 


Nucleated RBC %  0.0 /100WBC  02/08/19  08:43    


 


Sodium  140 mmol/L (135-145)   02/09/19  05:47    


 


Potassium  3.2 mmol/L (3.5-5.0)  L  02/09/19  05:47    


 


Chloride  112 mmol/L (101-111)  H  02/09/19  05:47    


 


Carbon Dioxide  21 mmol/L (21-32)   02/09/19  05:47    


 


Anion Gap  7.0  (6-13)   02/09/19  05:47    


 


BUN  26 mg/dL (6-20)  H  02/09/19  05:47    


 


Creatinine  1.0 mg/dL (0.6-1.2)   02/09/19  05:47    


 


Estimated GFR (MDRD)  72  (>89)  L  02/09/19  05:47    


 


Glucose  119 mg/dL ()  H  02/09/19  05:47    


 


Calcium  8.0 mg/dL (8.5-10.3)  L  02/09/19  05:47    


 


Phosphorus  3.4 mg/dL (2.5-4.6)   02/09/19  05:47    


 


Magnesium  1.8 mg/dL (1.7-2.8)   02/09/19  05:47    


 


Total Bilirubin  0.6 mg/dL (0.2-1.0)   02/08/19  08:43    


 


AST  25 IU/L (10-42)   02/08/19  08:43    


 


ALT  16 IU/L (10-60)   02/08/19  08:43    


 


Alkaline Phosphatase  76 IU/L ()   02/08/19  08:43    


 


Total Protein  7.2 g/dL (6.7-8.2)   02/08/19  08:43    


 


Albumin  3.7 g/dL (3.2-5.5)   02/08/19  08:43    


 


Globulin  3.5 g/dL (2.1-4.2)   02/08/19  08:43    


 


Albumin/Globulin Ratio  1.1  (1.0-2.2)   02/08/19  08:43    


 


Lipase  18 U/L (22-51)  L  02/08/19  08:43    


 


Urine Color  YELLOW   02/08/19  12:28    


 


Urine Clarity  CLEAR  (CLEAR)   02/08/19  12:28    


 


Urine pH  5.5 PH (5.0-7.5)   02/08/19  12:28    


 


Ur Specific Gravity  1.015  (1.002-1.030)   02/08/19  12:28    


 


Urine Protein  NEGATIVE mg/dL (NEGATIVE)   02/08/19  12:28    


 


Urine Glucose (UA)  NEGATIVE mg/dL (NEGATIVE)   02/08/19  12:28    


 


Urine Ketones  NEGATIVE mg/dL (NEGATIVE)   02/08/19  12:28    


 


Urine Occult Blood  NEGATIVE  (NEGATIVE)   02/08/19  12:28    


 


Urine Nitrite  NEGATIVE  (NEGATIVE)   02/08/19  12:28    


 


Urine Bilirubin  NEGATIVE  (NEGATIVE)   02/08/19  12:28    


 


Urine Urobilinogen  1 (NORMAL) E.U./dL (NORMAL)   02/08/19  12:28    


 


Ur Leukocyte Esterase  NEGATIVE  (NEGATIVE)   02/08/19  12:28    


 


Ur Microscopic Review  NOT INDICATED   02/08/19  12:28    


 


Urine Culture Comments  NOT INDICATED   02/08/19  12:28    














- Procedures


Procedures: 





Procedures





INCIS W REM OF FORIEGN BODY OR DEV FROM SKIN & SUBCUT TISSUE (05/30/14)











ABX Reporting


Has patient been on IV antibiotics over the past 48 hours?: No

## 2019-02-09 NOTE — PROVIDER PROGRESS NOTE
Subjective





- Prog Note Date


Prog Note Date: 02/09/19


Prog Note Time: 08:15





- Subjective


Subjective: 


Patient with emesis last night


Multiple bowel movements overnight and this morning


No further emesis or nausea after bowel movements


KUB this morning with continued dilated loops of bowel


K 3.2 today. Is being replaced





Current Medications





- Current Medications


Current Medications: 





Active Medications





Heparin Sodium (Porcine) ()  5,000 unit SUBQ BID [facility]


   Last Admin: 02/09/19 09:52 Dose:  5,000 unit


Hydralazine HCl (Apresoline Inj)  10 mg IVP DAILY PRN


   PRN Reason: Blood Pressure


Hydromorphone HCl (Dilaudid Inj Carp)  1 mg IVP Q4HR PRN


   PRN Reason: PAIN


   Last Admin: 02/09/19 00:40 Dose:  1 mg


Potassium Chloride/Sodium Chloride (Normal Saline 0.9% W/20 Meq Kcl)  1,000 mls 

@ 80 mls/hr IV .[id number] [facility]


Ondansetron HCl (Zofran Inj)  4 mg IVP Q6HR PRN


   PRN Reason: Nausea / Vomiting


   Last Admin: 02/09/19 02:19 Dose:  4 mg


Sodium Chloride (Normal Saline Flush 0.9%)  10 ml IVP PRN PRN


   PRN Reason: AS NEEDED PER PROVIDER ORDERS


Sodium Chloride (Normal Saline Flush 0.9%)  10 ml IVP 0100,0900,1700 [facility]


   Last Admin: 02/09/19 00:41 Dose:  10 ml





                                        


Home medications:





Aspirin 162 mg PO DAILY 07/10/13 


Acetaminophen/Diphenhydramine [Tylenol Pm Ex-Strength Caplet] 1 each PO QPM PRN 

02/08/19 











Objective





- Vital Signs/Intake & Output


Reviewed Vital Signs: Yes


Vital Signs: 


                                Vital Signs x48h











  Temp Pulse Resp BP Pulse Ox


 


 02/09/19 08:00  36.9 C  88  20  118/73  94











Intake & Output: 


                                 Intake & Output











 02/06/19 02/07/19 02/08/19 02/09/19





 23:59 23:59 23:59 23:59


 


Intake Total   672.922 3422.667


 


Output Total   375 


 


Balance   51.666 1396.667














- Objective


General Appearance: positive: No acute distress, Alert


Eyes Bilateral: positive: Normal inspection, PERRL, EOMI


ENT: positive: Dry mucous membranes


Neck: positive: Nml inspection, No JVD


Respiratory: positive: No respiratory distress, Breath sounds nml


Cardiovascular: positive: Regular rate & rhythm, No murmur, No gallop


Peripheral Pulses: 2+ Dorsalis pedis (R), 2+ Dorsalis pedis (L)


Abdomen: positive: Non-tender, Other (Still slightly distended, although not 

tender. Bowel sounds are hyperactive.)


Skin: positive: Warm, Dry


Extremities: positive: Non-tender, Full ROM


Neurologic/Psychiatric: positive: Oriented x3, CN's nml (2-12), Motor nml, 

Sensation nml, Mood/affect nml





- Lab Results


Fish Bones: 


                                 02/08/19 08:43





                                 02/09/19 05:47


Other Labs: 


                               Lab Results x24hrs











  02/09/19 02/08/19 02/08/19 Range/Units





  05:47 12:28 08:43 


 


WBC     (4.8-10.8)  x10^3/uL


 


RBC     (4.70-6.10)  10^6/uL


 


Hgb     (14.0-18.0)  g/dL


 


Hct     (42.0-52.0)  %


 


MCV     (80.0-94.0)  fL


 


MCH     (27.0-31.0)  pg


 


MCHC     (32.0-36.0)  g/dL


 


RDW     (12.0-15.0)  %


 


Plt Count     (130-450)  10^3/uL


 


MPV     (7.4-11.4)  fL


 


Neut # (Auto)     (1.5-6.6)  10^3/uL


 


Lymph # (Auto)     (1.5-3.5)  10^3/uL


 


Mono # (Auto)     (0.0-1.0)  10^3/uL


 


Eos # (Auto)     (0.0-0.7)  10^3/uL


 


Baso # (Auto)     (0.0-0.1)  10^3/uL


 


Absolute Nucleated RBC     x10^3/uL


 


Nucleated RBC %     /100WBC


 


Sodium  140   137  (135-145)  mmol/L


 


Potassium  3.2 L   2.9 L  (3.5-5.0)  mmol/L


 


Chloride  112 H   105  (101-111)  mmol/L


 


Carbon Dioxide  21   19 L  (21-32)  mmol/L


 


Anion Gap  7.0   13.0  (6-13)  


 


BUN  26 H   22 H  (6-20)  mg/dL


 


Creatinine  1.0   0.9  (0.6-1.2)  mg/dL


 


Estimated GFR (MDRD)  72 L   81 L  (>89)  


 


Glucose  119 H   124 H  ()  mg/dL


 


Calcium  8.0 L   8.6  (8.5-10.3)  mg/dL


 


Phosphorus  3.4    (2.5-4.6)  mg/dL


 


Magnesium  1.8    (1.7-2.8)  mg/dL


 


Total Bilirubin    0.6  (0.2-1.0)  mg/dL


 


AST    25  (10-42)  IU/L


 


ALT    16  (10-60)  IU/L


 


Alkaline Phosphatase    76  ()  IU/L


 


Total Protein    7.2  (6.7-8.2)  g/dL


 


Albumin    3.7  (3.2-5.5)  g/dL


 


Globulin    3.5  (2.1-4.2)  g/dL


 


Albumin/Globulin Ratio    1.1  (1.0-2.2)  


 


Lipase    18 L  (22-51)  U/L


 


Urine Color   YELLOW   


 


Urine Clarity   CLEAR   (CLEAR)  


 


Urine pH   5.5   (5.0-7.5)  PH


 


Ur Specific Gravity   1.015   (1.002-1.030)  


 


Urine Protein   NEGATIVE   (NEGATIVE)  mg/dL


 


Urine Glucose (UA)   NEGATIVE   (NEGATIVE)  mg/dL


 


Urine Ketones   NEGATIVE   (NEGATIVE)  mg/dL


 


Urine Occult Blood   NEGATIVE   (NEGATIVE)  


 


Urine Nitrite   NEGATIVE   (NEGATIVE)  


 


Urine Bilirubin   NEGATIVE   (NEGATIVE)  


 


Urine Urobilinogen   1 (NORMAL)   (NORMAL)  E.U./dL


 


Ur Leukocyte Esterase   NEGATIVE   (NEGATIVE)  


 


Ur Microscopic Review   NOT INDICATED   


 


Urine Culture Comments   NOT INDICATED   














  02/08/19 Range/Units





  08:43 


 


WBC  9.0  (4.8-10.8)  x10^3/uL


 


RBC  4.30 L  (4.70-6.10)  10^6/uL


 


Hgb  13.6 L  (14.0-18.0)  g/dL


 


Hct  40.4 L  (42.0-52.0)  %


 


MCV  93.9  (80.0-94.0)  fL


 


MCH  31.5 H  (27.0-31.0)  pg


 


MCHC  33.6  (32.0-36.0)  g/dL


 


RDW  14.1  (12.0-15.0)  %


 


Plt Count  318  (130-450)  10^3/uL


 


MPV  6.9 L  (7.4-11.4)  fL


 


Neut # (Auto)  7.1 H  (1.5-6.6)  10^3/uL


 


Lymph # (Auto)  1.0 L  (1.5-3.5)  10^3/uL


 


Mono # (Auto)  0.8  (0.0-1.0)  10^3/uL


 


Eos # (Auto)  0.0  (0.0-0.7)  10^3/uL


 


Baso # (Auto)  0.0  (0.0-0.1)  10^3/uL


 


Absolute Nucleated RBC  0.00  x10^3/uL


 


Nucleated RBC %  0.0  /100WBC


 


Sodium   (135-145)  mmol/L


 


Potassium   (3.5-5.0)  mmol/L


 


Chloride   (101-111)  mmol/L


 


Carbon Dioxide   (21-32)  mmol/L


 


Anion Gap   (6-13)  


 


BUN   (6-20)  mg/dL


 


Creatinine   (0.6-1.2)  mg/dL


 


Estimated GFR (MDRD)   (>89)  


 


Glucose   ()  mg/dL


 


Calcium   (8.5-10.3)  mg/dL


 


Phosphorus   (2.5-4.6)  mg/dL


 


Magnesium   (1.7-2.8)  mg/dL


 


Total Bilirubin   (0.2-1.0)  mg/dL


 


AST   (10-42)  IU/L


 


ALT   (10-60)  IU/L


 


Alkaline Phosphatase   ()  IU/L


 


Total Protein   (6.7-8.2)  g/dL


 


Albumin   (3.2-5.5)  g/dL


 


Globulin   (2.1-4.2)  g/dL


 


Albumin/Globulin Ratio   (1.0-2.2)  


 


Lipase   (22-51)  U/L


 


Urine Color   


 


Urine Clarity   (CLEAR)  


 


Urine pH   (5.0-7.5)  PH


 


Ur Specific Gravity   (1.002-1.030)  


 


Urine Protein   (NEGATIVE)  mg/dL


 


Urine Glucose (UA)   (NEGATIVE)  mg/dL


 


Urine Ketones   (NEGATIVE)  mg/dL


 


Urine Occult Blood   (NEGATIVE)  


 


Urine Nitrite   (NEGATIVE)  


 


Urine Bilirubin   (NEGATIVE)  


 


Urine Urobilinogen   (NORMAL)  E.U./dL


 


Ur Leukocyte Esterase   (NEGATIVE)  


 


Ur Microscopic Review   


 


Urine Culture Comments   














Assessment/Plan





- Problem List


(1) Partial small bowel obstruction


Impression: 


General surgery was consulted and he underwent a gastrograffin challenge. He had

emesis overnight, NGT was not placed. He also had multiple bowel movements 

overnight. This morning, his abdomen is much softer and he is passing gas. KUB 

this morning with continues dilated loops soon.


Plan: 


appreciate general surgery recommendations


hydration with IV fluids -- decrease rate today


CLD today and advance as tolerated








(2) Dehydration


Impression:


Secondary to above. BUN 26 today. 


Plan: 


continue IVF. 


clear liquid diet


BMP in the morning








(3) Hypokalemia


Impression:


Patient with h/o hypokalemia on supplementation at home. K 3.2 today. 


Plan: 


replete potassium. 


follow-up potassium level later today with replacement as indicated








(4) Hypertension


Impression:


Patient on HCTZ at home, although he reports not taking his medication for some 

time. Blood pressures have been in acceptable range. 


Plan: 


monitor blood pressure. 


hydralazine IV PRN ordered for SBP >160 


Qualifiers: 


   Hypertension type: essential hypertension   Qualified Code(s): I10 - 

Essential (primary) hypertension

## 2019-02-10 LAB
ALBUMIN DIAFP-MCNC: 2.6 G/DL (ref 3.2–5.5)
ALBUMIN/GLOB SERPL: 0.9 {RATIO} (ref 1–2.2)
ALP SERPL-CCNC: 61 IU/L (ref 42–121)
ALT SERPL W P-5'-P-CCNC: 19 IU/L (ref 10–60)
ANION GAP SERPL CALCULATED.4IONS-SCNC: 11 MMOL/L (ref 6–13)
AST SERPL W P-5'-P-CCNC: 19 IU/L (ref 10–42)
BASOPHILS NFR BLD AUTO: 0 10^3/UL (ref 0–0.1)
BASOPHILS NFR BLD AUTO: 0.1 %
BILIRUB BLD-MCNC: 1 MG/DL (ref 0.2–1)
BUN SERPL-MCNC: 22 MG/DL (ref 6–20)
CALCIUM UR-MCNC: 8.1 MG/DL (ref 8.5–10.3)
CHLORIDE SERPL-SCNC: 111 MMOL/L (ref 101–111)
CO2 SERPL-SCNC: 19 MMOL/L (ref 21–32)
CREAT SERPLBLD-SCNC: 0.7 MG/DL (ref 0.6–1.2)
EOSINOPHIL # BLD AUTO: 0 10^3/UL (ref 0–0.7)
EOSINOPHIL NFR BLD AUTO: 0.1 %
ERYTHROCYTE [DISTWIDTH] IN BLOOD BY AUTOMATED COUNT: 14.4 % (ref 12–15)
GFRSERPLBLD MDRD-ARVRAT: 108 ML/MIN/{1.73_M2} (ref 89–?)
GLOBULIN SER-MCNC: 2.8 G/DL (ref 2.1–4.2)
GLUCOSE SERPL-MCNC: 115 MG/DL (ref 70–100)
HGB UR QL STRIP: 11.3 G/DL (ref 14–18)
LYMPHOCYTES # SPEC AUTO: 0.8 10^3/UL (ref 1.5–3.5)
LYMPHOCYTES NFR BLD AUTO: 8.2 %
MCH RBC QN AUTO: 31.6 PG (ref 27–31)
MCHC RBC AUTO-ENTMCNC: 32.7 G/DL (ref 32–36)
MCV RBC AUTO: 96.6 FL (ref 80–94)
MONOCYTES # BLD AUTO: 0.5 10^3/UL (ref 0–1)
MONOCYTES NFR BLD AUTO: 5.1 %
NEUTROPHILS # BLD AUTO: 8.3 10^3/UL (ref 1.5–6.6)
NEUTROPHILS # SNV AUTO: 9.5 X10^3/UL (ref 4.8–10.8)
NEUTROPHILS NFR BLD AUTO: 86.5 %
PDW BLD AUTO: 7.2 FL (ref 7.4–11.4)
PLATELET # BLD: 191 10^3/UL (ref 130–450)
PROT SPEC-MCNC: 5.4 G/DL (ref 6.7–8.2)
RBC MAR: 3.58 10^6/UL (ref 4.7–6.1)
SODIUM SERPLBLD-SCNC: 141 MMOL/L (ref 135–145)

## 2019-02-10 RX ADMIN — HEPARIN SODIUM SCH UNIT: 5000 INJECTION, SOLUTION INTRAVENOUS; SUBCUTANEOUS at 20:17

## 2019-02-10 RX ADMIN — FAMOTIDINE SCH MG: 20 TABLET, FILM COATED ORAL at 12:25

## 2019-02-10 RX ADMIN — HEPARIN SODIUM SCH UNIT: 5000 INJECTION, SOLUTION INTRAVENOUS; SUBCUTANEOUS at 08:03

## 2019-02-10 RX ADMIN — FAMOTIDINE SCH MG: 20 TABLET, FILM COATED ORAL at 20:16

## 2019-02-10 RX ADMIN — SODIUM CHLORIDE AND POTASSIUM CHLORIDE SCH MLS/HR: 9; 1.49 INJECTION, SOLUTION INTRAVENOUS at 08:06

## 2019-02-10 RX ADMIN — ONDANSETRON PRN MG: 2 INJECTION INTRAMUSCULAR; INTRAVENOUS at 11:01

## 2019-02-10 RX ADMIN — SODIUM CHLORIDE, PRESERVATIVE FREE SCH: 5 INJECTION INTRAVENOUS at 18:09

## 2019-02-10 RX ADMIN — SODIUM CHLORIDE AND POTASSIUM CHLORIDE SCH: 9; 1.49 INJECTION, SOLUTION INTRAVENOUS at 10:40

## 2019-02-10 RX ADMIN — ASPIRIN SCH MG: 81 TABLET, CHEWABLE ORAL at 08:05

## 2019-02-10 RX ADMIN — HYDROMORPHONE HYDROCHLORIDE PRN MG: 1 INJECTION, SOLUTION INTRAMUSCULAR; INTRAVENOUS; SUBCUTANEOUS at 20:16

## 2019-02-10 RX ADMIN — SODIUM CHLORIDE, PRESERVATIVE FREE SCH ML: 5 INJECTION INTRAVENOUS at 11:01

## 2019-02-10 RX ADMIN — SODIUM CHLORIDE, PRESERVATIVE FREE SCH: 5 INJECTION INTRAVENOUS at 00:30

## 2019-02-10 NOTE — PROVIDER PROGRESS NOTE
Assessment/Plan





- Problem List


(1) Partial small bowel obstruction


Assessment/Plan: 


Continuing to slowly improve. Plan: full liquid diet today, decrease IVF, 

ambulate in halls, [id number] for heartburn, perhaps home tomorrow on low 

insoluble fiber diet if continues to improve.








- Current Meds


Current Meds: 





                               Current Medications











Generic Name Dose Route Start Last Admin





  Trade Name Freq  PRN Reason Stop Dose Admin


 


Aspirin  162 mg  02/10/19 09:00  02/10/19 08:05





  [facility] Aspirin  PO   162 mg





  DAILY LILLIANA   Administration





     





     





     





     


 


Calcium Carbonate/Glycine  500 mg  02/09/19 15:52  02/10/19 08:14





  Tums  PO   500 mg





  BID PRN   Administration





  INDIGESTION   





     





     





     


 


Heparin Sodium (Porcine)  5,000 unit  02/08/19 21:00  02/10/19 08:03





    SUBQ   5,000 unit





  BID LILLIANA   Administration





     





     





     





     


 


Hydromorphone HCl  1 mg  02/08/19 17:45  02/09/19 20:53





  Dilaudid Inj Carp  IVP   1 mg





  Q4HR PRN   Administration





  PAIN   





     





     





     


 


Ondansetron HCl  4 mg  02/08/19 15:12  02/09/19 02:19





  Zofran Inj  IVP   4 mg





  Q6HR PRN   Administration





  Nausea / Vomiting   





     





     





     


 


Sodium Chloride  10 ml  02/08/19 15:12  02/09/19 20:53





  Normal Saline Flush 0.9%  IVP   10 ml





  PRN PRN   Administration





  AS NEEDED PER PROVIDER ORDERS   





     





     





     


 


Sodium Chloride  10 ml  02/08/19 17:00  02/10/19 00:30





  Normal Saline Flush 0.9%  IVP   Not Given





  0100,0900,1700 [facility]   





     





     





     





     














- Lab Result


Fish Bone Diagrams: 


                                 02/10/19 05:56





                                 02/10/19 05:56





- Additional Planning


Condition/Complexity: Improved


My Orders: 





My Active Orders





02/10/19 10:30


Ns W/20 Meq KCl [Normal Saline 0.9% W/20 Meq KCl] 1,000 ml IV 50 mls/hr 





02/10/19 11:00


Famotidine [Pepcid]   20 mg PO BID 





02/11/19 05:00


CMP [COMPREHENSIVE METABOLIC PANEL] [CHEM] DAILYLAB 











Plan Discussed with:: Patient


Time Spent: 15-30 minutes





Subjective





- Subjective


Patient Reports: Feeling Better (multiple bms, some diarrrhea and explosive 

yesterday, but becoming formed today; no N/V but c/o heartburn, a chronic 

condition for which he uses tums frequently.), Resting Comfortably, Diarrhea, 

Heartburn





Objective


Vital Signs: 





                               Vital Signs - 24 hr











  02/09/19 02/10/19 02/10/19





  15:46 00:00 06:35


 


Temperature 36.6 C 36.6 C 36.3 C L


 


Heart Rate [ 86 76 69





Brachial]   


 


Respiratory 18 16 18





Rate   


 


Blood Pressure 140/78 H 126/76 146/67 H





[Left Brachial   





artery]   


 


O2 Saturation 97 93 98














  02/10/19





  08:41


 


Temperature 36.6 C


 


Heart Rate [ 80





Brachial] 


 


Respiratory 20





Rate 


 


Blood Pressure 147/86 H





[Left Brachial 





artery] 


 


O2 Saturation 93








                                     Oxygen











O2 Source                      Room air














I&O (Last 24 Hrs): 





                          Intake and Output Totals x24h











 02/08/19 02/09/19 02/10/19





 23:59 23:59 23:59


 


Intake Total 130.410 1338.667 1806.667


 


Output Total 375  


 


Balance 51.666 3766.667 1806.667











General: Alert, Oriented x3, Cooperative, No acute distress


HEENT: Mucous membr. moist/pink


Neck: No JVD


Neuro: Alert


Abdomen: Soft, No tenderness, No hepatospenomegaly, No masses, Other 

(hyperactive/high pitched, tympanitic bowel sounds persist; slightly less disten

ded than yesterday.)


Extremities: No edema, No tenderness/swelling





- Results


Results: 





                               Laboratory Results











WBC  9.5 x10^3/uL (4.8-10.8)   02/10/19  05:56    


 


RBC  3.58 10^6/uL (4.70-6.10)  L  02/10/19  05:56    


 


Hgb  11.3 g/dL (14.0-18.0)  L  02/10/19  05:56    


 


Hct  34.6 % (42.0-52.0)  L  02/10/19  05:56    


 


MCV  96.6 fL (80.0-94.0)  H  02/10/19  05:56    


 


MCH  31.6 pg (27.0-31.0)  H  02/10/19  05:56    


 


MCHC  32.7 g/dL (32.0-36.0)   02/10/19  05:56    


 


RDW  14.4 % (12.0-15.0)   02/10/19  05:56    


 


Plt Count  191 10^3/uL (130-450)   02/10/19  05:56    


 


MPV  7.2 fL (7.4-11.4)  L  02/10/19  05:56    


 


Neut # (Auto)  8.3 10^3/uL (1.5-6.6)  H  02/10/19  05:56    


 


Lymph # (Auto)  0.8 10^3/uL (1.5-3.5)  L  02/10/19  05:56    


 


Mono # (Auto)  0.5 10^3/uL (0.0-1.0)   02/10/19  05:56    


 


Eos # (Auto)  0.0 10^3/uL (0.0-0.7)   02/10/19  05:56    


 


Baso # (Auto)  0.0 10^3/uL (0.0-0.1)   02/10/19  05:56    


 


Absolute Nucleated RBC  0.00 x10^3/uL  02/10/19  05:56    


 


Nucleated RBC %  0.0 /100WBC  02/10/19  05:56    


 


Sodium  141 mmol/L (135-145)   02/10/19  05:56    


 


Potassium  3.4 mmol/L (3.5-5.0)  L  02/10/19  05:56    


 


Chloride  111 mmol/L (101-111)   02/10/19  05:56    


 


Carbon Dioxide  19 mmol/L (21-32)  L  02/10/19  05:56    


 


Anion Gap  11.0  (6-13)   02/10/19  05:56    


 


BUN  22 mg/dL (6-20)  H  02/10/19  05:56    


 


Creatinine  0.7 mg/dL (0.6-1.2)   02/10/19  05:56    


 


Estimated GFR (MDRD)  108  (>89)   02/10/19  05:56    


 


Glucose  115 mg/dL ()  H  02/10/19  05:56    


 


Calcium  8.1 mg/dL (8.5-10.3)  L  02/10/19  05:56    


 


Phosphorus  3.4 mg/dL (2.5-4.6)   02/09/19  05:47    


 


Magnesium  1.8 mg/dL (1.7-2.8)   02/09/19  05:47    


 


Total Bilirubin  1.0 mg/dL (0.2-1.0)   02/10/19  05:56    


 


AST  19 IU/L (10-42)   02/10/19  05:56    


 


ALT  19 IU/L (10-60)   02/10/19  05:56    


 


Alkaline Phosphatase  61 IU/L ()   02/10/19  05:56    


 


Total Protein  5.4 g/dL (6.7-8.2)  L  02/10/19  05:56    


 


Albumin  2.6 g/dL (3.2-5.5)  L  02/10/19  05:56    


 


Globulin  2.8 g/dL (2.1-4.2)   02/10/19  05:56    


 


Albumin/Globulin Ratio  0.9  (1.0-2.2)  L  02/10/19  05:56    


 


Lipase  18 U/L (22-51)  L  02/08/19  08:43    


 


Urine Color  YELLOW   02/08/19  12:28    


 


Urine Clarity  CLEAR  (CLEAR)   02/08/19  12:28    


 


Urine pH  5.5 PH (5.0-7.5)   02/08/19  12:28    


 


Ur Specific Gravity  1.015  (1.002-1.030)   02/08/19  12:28    


 


Urine Protein  NEGATIVE mg/dL (NEGATIVE)   02/08/19  12:28    


 


Urine Glucose (UA)  NEGATIVE mg/dL (NEGATIVE)   02/08/19  12:28    


 


Urine Ketones  NEGATIVE mg/dL (NEGATIVE)   02/08/19  12:28    


 


Urine Occult Blood  NEGATIVE  (NEGATIVE)   02/08/19  12:28    


 


Urine Nitrite  NEGATIVE  (NEGATIVE)   02/08/19  12:28    


 


Urine Bilirubin  NEGATIVE  (NEGATIVE)   02/08/19  12:28    


 


Urine Urobilinogen  1 (NORMAL) E.U./dL (NORMAL)   02/08/19  12:28    


 


Ur Leukocyte Esterase  NEGATIVE  (NEGATIVE)   02/08/19  12:28    


 


Ur Microscopic Review  NOT INDICATED   02/08/19  12:28    


 


Urine Culture Comments  NOT INDICATED   02/08/19  12:28    














- Procedures


Procedures: 





Procedures





INCIS W REM OF FORIEGN BODY OR DEV FROM SKIN & SUBCUT TISSUE (05/30/14)











ABX Reporting


Has patient been on IV antibiotics over the past 48 hours?: No

## 2019-02-10 NOTE — PROVIDER PROGRESS NOTE
Objective





- Vital Signs/Intake & Output


Reviewed Vital Signs: Yes


Vital Signs: 


                                Vital Signs x48h











  Temp Pulse Resp BP Pulse Ox


 


 02/10/19 08:41  36.6 C  80  20  147/86 H  93


 


 02/10/19 06:35  36.3 C L  69  18  146/67 H  98











Intake & Output: 


                                 Intake & Output











 02/07/19 02/08/19 02/09/19 02/10/19





 23:59 23:59 23:59 23:59


 


Intake Total  125.407 8940.667 1806.667


 


Output Total  375  


 


Balance  51.666 3766.667 1806.667














- Objective


General Appearance: positive: No acute distress, Alert


Eyes Bilateral: positive: Normal inspection, PERRL, EOMI


ENT: positive: ENT inspection nml, Pharynx nml


Neck: positive: Nml inspection, Trachea midline


Respiratory: positive: No respiratory distress, Breath sounds nml


Cardiovascular: positive: Regular rate & rhythm.  negative: No murmur, No gallop


Peripheral Pulses: 2+ Dorsalis pedis (R), 2+ Dorsalis pedis (L)


Abdomen: positive: Non-tender, Nml bowel sounds, No distention.  negative: 

Guarding, Rebound


Skin: positive: Warm, Dry


Extremities: positive: Non-tender, Full ROM


Neurologic/Psychiatric: positive: Oriented x3, CN's nml (2-12), Motor nml, 

Sensation nml, Mood/affect nml





- Lab Results


Fish Bones: 


                                 02/10/19 05:56





                                 02/10/19 05:56


Other Labs: 


                               Lab Results x24hrs











  02/10/19 02/10/19 02/09/19 Range/Units





  05:56 05:56 12:30 


 


WBC   9.5   (4.8-10.8)  x10^3/uL


 


RBC   3.58 L   (4.70-6.10)  10^6/uL


 


Hgb   11.3 L   (14.0-18.0)  g/dL


 


Hct   34.6 L   (42.0-52.0)  %


 


MCV   96.6 H   (80.0-94.0)  fL


 


MCH   31.6 H   (27.0-31.0)  pg


 


MCHC   32.7   (32.0-36.0)  g/dL


 


RDW   14.4   (12.0-15.0)  %


 


Plt Count   191   (130-450)  10^3/uL


 


MPV   7.2 L   (7.4-11.4)  fL


 


Neut # (Auto)   8.3 H   (1.5-6.6)  10^3/uL


 


Lymph # (Auto)   0.8 L   (1.5-3.5)  10^3/uL


 


Mono # (Auto)   0.5   (0.0-1.0)  10^3/uL


 


Eos # (Auto)   0.0   (0.0-0.7)  10^3/uL


 


Baso # (Auto)   0.0   (0.0-0.1)  10^3/uL


 


Absolute Nucleated RBC   0.00   x10^3/uL


 


Nucleated RBC %   0.0   /100WBC


 


Sodium  141    (135-145)  mmol/L


 


Potassium  3.4 L   3.6  (3.5-5.0)  mmol/L


 


Chloride  111    (101-111)  mmol/L


 


Carbon Dioxide  19 L    (21-32)  mmol/L


 


Anion Gap  11.0    (6-13)  


 


BUN  22 H    (6-20)  mg/dL


 


Creatinine  0.7    (0.6-1.2)  mg/dL


 


Estimated GFR (MDRD)  108    (>89)  


 


Glucose  115 H    ()  mg/dL


 


Calcium  8.1 L    (8.5-10.3)  mg/dL


 


Total Bilirubin  1.0    (0.2-1.0)  mg/dL


 


AST  19    (10-42)  IU/L


 


ALT  19    (10-60)  IU/L


 


Alkaline Phosphatase  61    ()  IU/L


 


Total Protein  5.4 L    (6.7-8.2)  g/dL


 


Albumin  2.6 L    (3.2-5.5)  g/dL


 


Globulin  2.8    (2.1-4.2)  g/dL


 


Albumin/Globulin Ratio  0.9 L    (1.0-2.2)  














  02/09/19 Range/Units





  12:30 


 


WBC  12.9 H  (4.8-10.8)  x10^3/uL


 


RBC  3.88 L  (4.70-6.10)  10^6/uL


 


Hgb  12.4 L  (14.0-18.0)  g/dL


 


Hct  36.6 L  (42.0-52.0)  %


 


MCV  94.3 H  (80.0-94.0)  fL


 


MCH  31.9 H  (27.0-31.0)  pg


 


MCHC  33.8  (32.0-36.0)  g/dL


 


RDW  14.1  (12.0-15.0)  %


 


Plt Count  248  (130-450)  10^3/uL


 


MPV  6.7 L  (7.4-11.4)  fL


 


Neut # (Auto)  12.0 H  (1.5-6.6)  10^3/uL


 


Lymph # (Auto)  0.5 L  (1.5-3.5)  10^3/uL


 


Mono # (Auto)  0.4  (0.0-1.0)  10^3/uL


 


Eos # (Auto)  0.0  (0.0-0.7)  10^3/uL


 


Baso # (Auto)  0.0  (0.0-0.1)  10^3/uL


 


Absolute Nucleated RBC  0.00  x10^3/uL


 


Nucleated RBC %  0.0  /100WBC


 


Sodium   (135-145)  mmol/L


 


Potassium   (3.5-5.0)  mmol/L


 


Chloride   (101-111)  mmol/L


 


Carbon Dioxide   (21-32)  mmol/L


 


Anion Gap   (6-13)  


 


BUN   (6-20)  mg/dL


 


Creatinine   (0.6-1.2)  mg/dL


 


Estimated GFR (MDRD)   (>89)  


 


Glucose   ()  mg/dL


 


Calcium   (8.5-10.3)  mg/dL


 


Total Bilirubin   (0.2-1.0)  mg/dL


 


AST   (10-42)  IU/L


 


ALT   (10-60)  IU/L


 


Alkaline Phosphatase   ()  IU/L


 


Total Protein   (6.7-8.2)  g/dL


 


Albumin   (3.2-5.5)  g/dL


 


Globulin   (2.1-4.2)  g/dL


 


Albumin/Globulin Ratio   (1.0-2.2)  














Assessment/Plan





- Problem List


(1) Partial small bowel obstruction


Impression: 


General surgery was consulted and he underwent a gastrografin challenge. He had 

multiple bowel movements. This morning, his abdomen is soft and he is passing 

gas. KUB 2/9 with continued dilated loops of bowel


Plan: 


appreciate general surgery recommendations


hydration with IV fluids -- decrease rate today


fulll liquid diet today


if he tolerates, plan to advance to a low-residue diet tomorrow





(2) Dehydration


Impression:


Secondary to above. BUN 22 today. It is improving.


Plan: 


continue IVF, rate decreased today as he tolerated CLD yesterday


advance to full liquid diet today


BMP in the morning





(3) Hypokalemia


Impression:


Patient with h/o hypokalemia on supplementation at home. K 3.4 today. 


Plan: 


replete potassium. 


follow-up potassium level tomorrow





(4) Hypertension


Impression:


Patient on HCTZ at home, although he reports not taking his medication for some 

time. Blood pressures have been in acceptable range. 


Plan: 


monitor blood pressure. 


hydralazine IV PRN ordered for SBP >160 


Qualifiers: 


   Hypertension type: essential hypertension   Qualified Code(s): I10 - 

Essential (primary) hypertension 





(5) Indigestion


Impression:


Patient has been complaining of indigestion. He has tums available as needed


Plan: H2 blocker added today

## 2019-02-11 VITALS — SYSTOLIC BLOOD PRESSURE: 151 MMHG | DIASTOLIC BLOOD PRESSURE: 86 MMHG

## 2019-02-11 LAB
ALBUMIN DIAFP-MCNC: 2.4 G/DL (ref 3.2–5.5)
ALBUMIN/GLOB SERPL: 0.9 {RATIO} (ref 1–2.2)
ALP SERPL-CCNC: 62 IU/L (ref 42–121)
ALT SERPL W P-5'-P-CCNC: 20 IU/L (ref 10–60)
ANION GAP SERPL CALCULATED.4IONS-SCNC: 5 MMOL/L (ref 6–13)
AST SERPL W P-5'-P-CCNC: 17 IU/L (ref 10–42)
BILIRUB BLD-MCNC: 1.1 MG/DL (ref 0.2–1)
BUN SERPL-MCNC: 14 MG/DL (ref 6–20)
CALCIUM UR-MCNC: 8.2 MG/DL (ref 8.5–10.3)
CHLORIDE SERPL-SCNC: 115 MMOL/L (ref 101–111)
CO2 SERPL-SCNC: 20 MMOL/L (ref 21–32)
CREAT SERPLBLD-SCNC: 0.8 MG/DL (ref 0.6–1.2)
ERYTHROCYTE [DISTWIDTH] IN BLOOD BY AUTOMATED COUNT: 14.2 % (ref 12–15)
GFRSERPLBLD MDRD-ARVRAT: 93 ML/MIN/{1.73_M2} (ref 89–?)
GLOBULIN SER-MCNC: 2.7 G/DL (ref 2.1–4.2)
GLUCOSE SERPL-MCNC: 102 MG/DL (ref 70–100)
HGB UR QL STRIP: 10.5 G/DL (ref 14–18)
MCH RBC QN AUTO: 31.4 PG (ref 27–31)
MCHC RBC AUTO-ENTMCNC: 32.7 G/DL (ref 32–36)
MCV RBC AUTO: 95.9 FL (ref 80–94)
NEUTROPHILS # SNV AUTO: 9.9 X10^3/UL (ref 4.8–10.8)
PDW BLD AUTO: 7.5 FL (ref 7.4–11.4)
PLATELET # BLD: 196 10^3/UL (ref 130–450)
PROT SPEC-MCNC: 5.1 G/DL (ref 6.7–8.2)
RBC MAR: 3.33 10^6/UL (ref 4.7–6.1)
SODIUM SERPLBLD-SCNC: 140 MMOL/L (ref 135–145)

## 2019-02-11 RX ADMIN — HEPARIN SODIUM SCH UNIT: 5000 INJECTION, SOLUTION INTRAVENOUS; SUBCUTANEOUS at 08:29

## 2019-02-11 RX ADMIN — FAMOTIDINE SCH MG: 20 TABLET, FILM COATED ORAL at 08:28

## 2019-02-11 RX ADMIN — ASPIRIN SCH MG: 81 TABLET, CHEWABLE ORAL at 08:27

## 2019-02-11 RX ADMIN — SODIUM CHLORIDE, PRESERVATIVE FREE SCH ML: 5 INJECTION INTRAVENOUS at 08:27

## 2019-02-11 RX ADMIN — SODIUM CHLORIDE AND POTASSIUM CHLORIDE SCH MLS/HR: 9; 1.49 INJECTION, SOLUTION INTRAVENOUS at 01:48

## 2019-02-11 RX ADMIN — SODIUM CHLORIDE, PRESERVATIVE FREE SCH: 5 INJECTION INTRAVENOUS at 05:15

## 2019-02-11 NOTE — DISCHARGE SUMMARY
Discharge Summary


Admit Date: 02/08/19


Discharge Date: 02/11/19


Discharging Provider: Umu JOVEL


Primary Care Provider: Dr. En Main


Code Status: Attempt Resuscitation


Condition at Discharge: Good


Discharge Disposition: 01 Home, Self Care





- DIAGNOSES


Admission Diagnoses: 


Partial small bowel obstruction


Dehydration


Hypokalemia


Hypertension





 


Discharge Diagnoses with Status of Each Condition: 


Partial small bowel obstruction, resolving


Dehydration, resolved


Hypokalemia, resolved


Hypertension, stable


Indigestion, improved








- HPI


History of Present Illness: 


Mr Iqbal is a 84 yo male with a past medical history of history of stage 4 

diffuse large B-cell lymphoma diagnosed in 2012 after he presented with a small 

bowel obstruction and required right colectomy including resection of his 

terminal ileum where the tumor was apparently located. He then receive 6 cycles 

of chemotherapy. His latest CT scan for surveillance in 2014 showed no evidence 

of disease. He also has a history of hypertension and hypokalemia. He has not 

had any hospitalizations or ER visits for obstructions after surgery. He drove 

himself to the ER today with complaints over the last 10 days to 2 weeks of 

abdominal cramps. He denies nausea. He reports passing a little gas yesterday 

and he had a small bowel movement today. Last normal bowel movement was 

yesterday. He denies melena or BRBPR. He states that he is hungry. CT abd/pelvis

in the ER shows markedly dilated small bowel with air fluid levels and a  

decompressed large bowel distal to his anastamosis. There is no evidence of 

recurrent cancer. 





He has noted wt loss of approximately 10 lbs over the last two years. He denies 

fevers or chills, chest pain, or urinary symptoms. He does endorse dyspnea on 

exertion recently, specially 2-3 days ago when he was helping his daughter feed 

some of the animals on their properties. He denies orthopnea or dyspnea at rest.





He is being admitted to the hospital for observation to include bowel rest, IV 

fluids, potassium repletion, and undergo a gastrografin challenge per the 

general surgeon.





- CONSULTS | PROCEDURES


Consultations: Dr. Niranjan Almeida, general surgery





- HOSPITAL COURSE


Hospital Course: 


Dutch was admitted to the hospital with a partial small bowel obstruction on 

2/8/2019. An NGT tube was not placed. He was hydrated with IV fluids and his BUN

improved to 14. General surgery was consulted and he underwent a gastrografin 

challenge. After the gastrografin, he had a couple episodes of emesis, but 

ultimately had a bowel movement the evening of 2/8/2019. KUB completed 2/9/2019 

with continued dilated loops of bowel. He was started on a clear liquid diet and

advanced slowly to a low insoluble fiber diet. He is to avoid raw vegetables and

nuts. He continued to have many bowel movements during his hospitalization. 





He has a history of hypokalemia on supplementation at home. He had hypokalemia 

during his hospitalization and this was repleted. On the day of discharge his 

potassium level was 4.1.





Patient on HCTZ at home, although he reports not taking his medication for some 

time. Blood pressures were in acceptable range during his hospitalization. 





He complained of indigestion and an H2 blocker and PRN calcium carbonate were 

added.





- ALLERGIES


Allergies/Adverse Reactions: 


                                    Allergies











Allergy/AdvReac Type Severity Reaction Status Date / Time


 


No Known Drug Allergies Allergy   Verified 02/08/19 10:24














- MEDICATIONS


Home Medications: 


                                Ambulatory Orders











 Medication  Instructions  Recorded  Confirmed


 


Aspirin 162 mg PO DAILY 07/10/13 02/08/19


 


Acetaminophen/Diphenhydramine 1 each PO QPM PRN 02/08/19 02/08/19





[Tylenol Pm Ex-Strength Caplet]   


 


Calcium Carbonate [Tums (Calcium 500 mg PO BID PRN  tablet 02/11/19 





Carbonate 500mg)]   


 


Famotidine [Pepcid] 20 mg PO BID  tablet 02/11/19 


 


Ondansetron HCl [Zofran] 4 mg PO Q8HR #10 tablet 02/11/19 


 


Polyethylene Glycol 3350 [Miralax] 17 gm PO DAILY #7 packet 02/11/19 














- PHYSICAL EXAM AT DISCHARGE


General Appearance: positive: No acute distress, Alert


Eyes Bilateral: positive: Normal inspection, PERRL, EOMI


ENT: positive: ENT inspection nml, Pharynx nml, No signs of dehydration


Neck: positive: Nml inspection, Trachea midline


Respiratory: positive: No respiratory distress, Breath sounds nml


Cardiovascular: positive: Regular rate & rhythm.  negative: No murmur, No 

gallop, Tachycardia


Peripheral Pulses: positive: 2+


Abdomen: positive: Non-tender, Nml bowel sounds, No distention.  negative: 

Guarding, Rebound


Skin: positive: Warm


Neurologic/Psychiatric: positive: Oriented x3, CN's nml (2-12), Sensation nml, 

Mood/affect nml, Weakness





- LABS


Result Diagrams: 


                                 02/11/19 04:20





                                 02/11/19 04:20





- DIAGNOSTIC IMAGING


Diagnostic Imaging Results: Final report reviewed


Diagnostic Imaging Results Comments: 


CT scan abdomen/pelvis with IV contrast 2/8/2019 -- small bowel obstruction to 

the level of the surgical anastamotic staple line right lower quadrant, 

presumably a large bowel to small bowel anastamosis





KUB 2/9/2019 -- multiple severely dilated small bowel loops are again seen, 

consistent with high-grade bowel obstruction. Oral contrast material has faintly

 distributed through the bowel loops. Small amount of contrast material within 

the stomach. Small hiatal hernia suspected.








- FOLLOW UP


Follow Up: 


Follow-up with PCP within 7-10 days for monitoring of blood pressure and 

hypokalemia. Follow-up with Dr. Almeida (general surgeon) in 2 weeks.








- TIME SPENT


Time Spent in Discharge (Minutes): 45

## 2019-02-11 NOTE — PROVIDER PROGRESS NOTE
Assessment/Plan





- Problem List


(1) Partial small bowel obstruction


Assessment/Plan: 


Resolving. Rec: advance to low insoluble fiber diet, ok to d/c home on same, 

outpt f/u in 1-2 weeks in my office or PCP.








- Current Meds


Current Meds: 





                               Current Medications











Generic Name Dose Route Start Last Admin





  Trade Name Freq  PRN Reason Stop Dose Admin


 


Aspirin  162 mg  02/10/19 09:00  02/11/19 08:27





  [facility] [place] Aspirin  PO   162 mg





  DAILY LILLIANA   Administration





     





     





     





     


 


Calcium Carbonate/Glycine  500 mg  02/09/19 15:52  02/10/19 08:14





  Tums  PO   500 mg





  BID PRN   Administration





  INDIGESTION   





     





     





     


 


Famotidine  20 mg  02/10/19 11:00  02/11/19 08:28





  Pepcid  PO   20 mg





  BID LILLIANA   Administration





     





     





     





     


 


Heparin Sodium (Porcine)  5,000 unit  02/08/19 21:00  02/11/19 08:29





    SUBQ   5,000 unit





  BID LILLIANA   Administration





     





     





     





     


 


Hydromorphone HCl  1 mg  02/08/19 17:45  02/10/19 20:16





  Dilaudid Inj Carp  IVP   1 mg





  Q4HR PRN   Administration





  PAIN   





     





     





     


 


Ondansetron HCl  4 mg  02/08/19 15:12  02/10/19 11:01





  Zofran Inj  IVP   4 mg





  Q6HR PRN   Administration





  Nausea / Vomiting   





     





     





     


 


Sodium Chloride  10 ml  02/08/19 15:12  02/09/19 20:53





  Normal Saline Flush 0.9%  IVP   10 ml





  PRN PRN   Administration





  AS NEEDED PER PROVIDER ORDERS   





     





     





     


 


Sodium Chloride  10 ml  02/08/19 17:00  02/11/19 08:27





  Normal Saline Flush 0.9%  IVP   10 ml





  0100,0900,1700 LILLIANA   Administration





     





     





     





     














- Lab Result


Lab results reviewed: Yes


Fish Bone Diagrams: 


                                 02/11/19 04:20





                                 02/11/19 04:20





- Additional Planning


Condition/Complexity: Improved


My Orders: 





My Active Orders





02/10/19 11:00


Famotidine [Pepcid]   20 mg PO BID 











Plan Discussed with:: Patient, Family


Time Spent: 15-30 minutes





Subjective





- Subjective


Patient Reports: Feeling Better (3 bm's yesterday; tolerating full liquid diet 

well, ambulating well, voiding well. No abd pain,N/V), Resting Comfortably, No 

Complaints


Nursing Reports: No Complaints





Objective


Vital Signs: 





                               Vital Signs - 24 hr











  02/10/19 02/10/19 02/10/19





  13:00 15:48 20:15


 


Temperature 36.9 C 36.7 C 36.8 C


 


Heart Rate [ 90 79 77





Brachial]   


 


Respiratory 18 18 18





Rate   


 


Blood Pressure 146/75 H 146/80 H 132/75 H





[Left Brachial   





artery]   


 


O2 Saturation 94 93 96














  02/10/19 02/11/19 02/11/19





  23:48 04:25 08:15


 


Temperature 36.5 C 37.1 C 36.7 C


 


Heart Rate [ 88 88 82





Brachial]   


 


Respiratory 18 18 18





Rate   


 


Blood Pressure 157/86 H 150/72 H 149/69 H





[Left Brachial   





artery]   


 


O2 Saturation 92 91 L 94








                                     Oxygen











O2 Source                      Room air














I&O (Last 24 Hrs): 





                          Intake and Output Totals x24h











 02/09/19 02/10/19 02/11/19





 23:59 23:59 23:59


 


Intake Total 3766.667 3543.667 480


 


Balance 3766.667 3543.667 480











General: Alert, Oriented x3, Cooperative


Abdomen: Soft, No tenderness, No hepatospenomegaly, No masses, Other (less 

distended, bowel sounds still  hyperactive)


Extremities: No edema, No tenderness/swelling





- Results


Results: 





                               Laboratory Results











WBC  9.9 x10^3/uL (4.8-10.8)   02/11/19  04:20    


 


RBC  3.33 10^6/uL (4.70-6.10)  L  02/11/19  04:20    


 


Hgb  10.5 g/dL (14.0-18.0)  L  02/11/19  04:20    


 


Hct  31.9 % (42.0-52.0)  L  02/11/19  04:20    


 


MCV  95.9 fL (80.0-94.0)  H  02/11/19  04:20    


 


MCH  31.4 pg (27.0-31.0)  H  02/11/19  04:20    


 


MCHC  32.7 g/dL (32.0-36.0)   02/11/19  04:20    


 


RDW  14.2 % (12.0-15.0)   02/11/19  04:20    


 


Plt Count  196 10^3/uL (130-450)   02/11/19  04:20    


 


MPV  7.5 fL (7.4-11.4)   02/11/19  04:20    


 


Neut # (Auto)  8.3 10^3/uL (1.5-6.6)  H  02/10/19  05:56    


 


Lymph # (Auto)  0.8 10^3/uL (1.5-3.5)  L  02/10/19  05:56    


 


Mono # (Auto)  0.5 10^3/uL (0.0-1.0)   02/10/19  05:56    


 


Eos # (Auto)  0.0 10^3/uL (0.0-0.7)   02/10/19  05:56    


 


Baso # (Auto)  0.0 10^3/uL (0.0-0.1)   02/10/19  05:56    


 


Absolute Nucleated RBC  0.00 x10^3/uL  02/10/19  05:56    


 


Nucleated RBC %  0.0 /100WBC  02/10/19  05:56    


 


Sodium  140 mmol/L (135-145)   02/11/19  04:20    


 


Potassium  4.1 mmol/L (3.5-5.0)   02/11/19  04:20    


 


Chloride  115 mmol/L (101-111)  H  02/11/19  04:20    


 


Carbon Dioxide  20 mmol/L (21-32)  L  02/11/19  04:20    


 


Anion Gap  5.0  (6-13)  L  02/11/19  04:20    


 


BUN  14 mg/dL (6-20)   02/11/19  04:20    


 


Creatinine  0.8 mg/dL (0.6-1.2)   02/11/19  04:20    


 


Estimated GFR (MDRD)  93  (>89)   02/11/19  04:20    


 


Glucose  102 mg/dL ()  H  02/11/19  04:20    


 


Calcium  8.2 mg/dL (8.5-10.3)  L  02/11/19  04:20    


 


Phosphorus  3.4 mg/dL (2.5-4.6)   02/09/19  05:47    


 


Magnesium  1.8 mg/dL (1.7-2.8)   02/09/19  05:47    


 


Total Bilirubin  1.1 mg/dL (0.2-1.0)  H  02/11/19  04:20    


 


AST  17 IU/L (10-42)   02/11/19  04:20    


 


ALT  20 IU/L (10-60)   02/11/19  04:20    


 


Alkaline Phosphatase  62 IU/L ()   02/11/19  04:20    


 


Total Protein  5.1 g/dL (6.7-8.2)  L  02/11/19  04:20    


 


Albumin  2.4 g/dL (3.2-5.5)  L  02/11/19  04:20    


 


Globulin  2.7 g/dL (2.1-4.2)   02/11/19  04:20    


 


Albumin/Globulin Ratio  0.9  (1.0-2.2)  L  02/11/19  04:20    


 


Lipase  18 U/L (22-51)  L  02/08/19  08:43    


 


Urine Color  YELLOW   02/08/19  12:28    


 


Urine Clarity  CLEAR  (CLEAR)   02/08/19  12:28    


 


Urine pH  5.5 PH (5.0-7.5)   02/08/19  12:28    


 


Ur Specific Gravity  1.015  (1.002-1.030)   02/08/19  12:28    


 


Urine Protein  NEGATIVE mg/dL (NEGATIVE)   02/08/19  12:28    


 


Urine Glucose (UA)  NEGATIVE mg/dL (NEGATIVE)   02/08/19  12:28    


 


Urine Ketones  NEGATIVE mg/dL (NEGATIVE)   02/08/19  12:28    


 


Urine Occult Blood  NEGATIVE  (NEGATIVE)   02/08/19  12:28    


 


Urine Nitrite  NEGATIVE  (NEGATIVE)   02/08/19  12:28    


 


Urine Bilirubin  NEGATIVE  (NEGATIVE)   02/08/19  12:28    


 


Urine Urobilinogen  1 (NORMAL) E.U./dL (NORMAL)   02/08/19  12:28    


 


Ur Leukocyte Esterase  NEGATIVE  (NEGATIVE)   02/08/19  12:28    


 


Ur Microscopic Review  NOT INDICATED   02/08/19  12:28    


 


Urine Culture Comments  NOT INDICATED   02/08/19  12:28    














- Procedures


Procedures: 





Procedures





INCIS W REM OF FORIEGN BODY OR DEV FROM SKIN & SUBCUT TISSUE (05/30/14)











ABX Reporting


Has patient been on IV antibiotics over the past 48 hours?: No

## 2019-02-11 NOTE — DISCHARGE PLAN
Discharge Plan


Disposition: 01 Home, Self Care


Condition: Stable


Prescriptions: 


Ondansetron HCl [Zofran] 4 mg PO Q8HR #10 tablet


Polyethylene Glycol 3350 [Miralax] 17 gm PO DAILY #7 packet


Diet: Regular (Low insoluble fiber diet. Avoid RAW vegetables and nuts)


Activity Restrictions: No Restrictions


Shower Restrictions: No


Driving Restrictions: No


Assistance Devices: Walker


Weight Bearing: Full Weight


Additional Instructions or Follow Up instructions: 


You were admitted to the hospital for a small bowel obstruction. Your bowel 

obstruction improved and you were able to tolerate an oral diet. Please avoid 

RAW vegetables and nuts. Cooked vegetables are OK. If you develop sudden onset 

nausea/vomitting, abdominal pain, stop passing gas, please seek immediate 

medical attention. Take miralax (laxative) as needed to keep bowel movements 

soft. Zofran (anti-nausea) and miralax have been sent to the [facility] in [place]. Follow-up with general surgeon, Dr. Almeida in 2 weeks.


No Smoking: If you smoke, Please STOP!  Call [phone] for help.


Follow-up with: 


En Main MD [Primary Care Provider] - 


Niranjan Almeida MD [Provider Admit Priv/Credential] -

## 2019-02-14 ENCOUNTER — HOSPITAL ENCOUNTER (OUTPATIENT)
Dept: HOSPITAL 76 - EMS | Age: 84
End: 2019-02-14
Attending: SURGERY
Payer: MEDICARE

## 2019-02-14 DIAGNOSIS — R11.0: ICD-10-CM

## 2019-02-14 DIAGNOSIS — R10.30: ICD-10-CM

## 2019-02-14 DIAGNOSIS — R06.00: Primary | ICD-10-CM

## 2019-02-15 ENCOUNTER — HOSPITAL ENCOUNTER (INPATIENT)
Dept: HOSPITAL 76 - ED | Age: 84
LOS: 5 days | Discharge: HOME | DRG: 329 | End: 2019-02-20
Attending: SPECIALIST | Admitting: SURGERY
Payer: MEDICARE

## 2019-02-15 DIAGNOSIS — I10: ICD-10-CM

## 2019-02-15 DIAGNOSIS — Z85.72: ICD-10-CM

## 2019-02-15 DIAGNOSIS — F17.200: ICD-10-CM

## 2019-02-15 DIAGNOSIS — E87.1: ICD-10-CM

## 2019-02-15 DIAGNOSIS — C18.0: ICD-10-CM

## 2019-02-15 DIAGNOSIS — Z79.82: ICD-10-CM

## 2019-02-15 DIAGNOSIS — K56.609: Primary | ICD-10-CM

## 2019-02-15 DIAGNOSIS — C77.2: ICD-10-CM

## 2019-02-15 DIAGNOSIS — I95.9: ICD-10-CM

## 2019-02-15 DIAGNOSIS — K63.1: ICD-10-CM

## 2019-02-15 DIAGNOSIS — E87.6: ICD-10-CM

## 2019-02-15 LAB
ALBUMIN DIAFP-MCNC: 2 G/DL (ref 3.2–5.5)
ALBUMIN DIAFP-MCNC: 2.4 G/DL (ref 3.2–5.5)
ALBUMIN/GLOB SERPL: 0.8 {RATIO} (ref 1–2.2)
ALBUMIN/GLOB SERPL: 1 {RATIO} (ref 1–2.2)
ALP SERPL-CCNC: 41 IU/L (ref 42–121)
ALP SERPL-CCNC: 52 IU/L (ref 42–121)
ALT SERPL W P-5'-P-CCNC: 16 IU/L (ref 10–60)
ALT SERPL W P-5'-P-CCNC: 16 IU/L (ref 10–60)
ANION GAP SERPL CALCULATED.4IONS-SCNC: 10 MMOL/L (ref 6–13)
ANION GAP SERPL CALCULATED.4IONS-SCNC: 11 MMOL/L (ref 6–13)
ANION GAP SERPL CALCULATED.4IONS-SCNC: 8 MMOL/L (ref 6–13)
AST SERPL W P-5'-P-CCNC: 21 IU/L (ref 10–42)
AST SERPL W P-5'-P-CCNC: 21 IU/L (ref 10–42)
BASOPHILS # BLD MANUAL: 0 10^3/UL (ref 0–0.1)
BASOPHILS NFR BLD AUTO: 1.7 %
BILIRUB BLD-MCNC: 0.8 MG/DL (ref 0.2–1)
BILIRUB BLD-MCNC: 1 MG/DL (ref 0.2–1)
BUN SERPL-MCNC: 13 MG/DL (ref 6–20)
BUN SERPL-MCNC: 13 MG/DL (ref 6–20)
BUN SERPL-MCNC: 14 MG/DL (ref 6–20)
CALCIUM UR-MCNC: 7.2 MG/DL (ref 8.5–10.3)
CALCIUM UR-MCNC: 7.4 MG/DL (ref 8.5–10.3)
CALCIUM UR-MCNC: 7.8 MG/DL (ref 8.5–10.3)
CHLORIDE SERPL-SCNC: 101 MMOL/L (ref 101–111)
CHLORIDE SERPL-SCNC: 102 MMOL/L (ref 101–111)
CHLORIDE SERPL-SCNC: 103 MMOL/L (ref 101–111)
CO2 SERPL-SCNC: 21 MMOL/L (ref 21–32)
CO2 SERPL-SCNC: 22 MMOL/L (ref 21–32)
CO2 SERPL-SCNC: 25 MMOL/L (ref 21–32)
CREAT SERPLBLD-SCNC: 0.7 MG/DL (ref 0.6–1.2)
CREAT SERPLBLD-SCNC: 0.8 MG/DL (ref 0.6–1.2)
CREAT SERPLBLD-SCNC: 0.9 MG/DL (ref 0.6–1.2)
EOSINOPHIL # BLD MANUAL: 0 10^3/UL (ref 0–0.7)
EOSINOPHIL NFR BLD AUTO: 0.4 %
ERYTHROCYTE [DISTWIDTH] IN BLOOD BY AUTOMATED COUNT: 14 % (ref 12–15)
ERYTHROCYTE [DISTWIDTH] IN BLOOD BY AUTOMATED COUNT: 14.1 % (ref 12–15)
GFRSERPLBLD MDRD-ARVRAT: 108 ML/MIN/{1.73_M2} (ref 89–?)
GFRSERPLBLD MDRD-ARVRAT: 81 ML/MIN/{1.73_M2} (ref 89–?)
GFRSERPLBLD MDRD-ARVRAT: 93 ML/MIN/{1.73_M2} (ref 89–?)
GLOBULIN SER-MCNC: 2.5 G/DL (ref 2.1–4.2)
GLOBULIN SER-MCNC: 2.5 G/DL (ref 2.1–4.2)
GLUCOSE SERPL-MCNC: 114 MG/DL (ref 70–100)
GLUCOSE SERPL-MCNC: 141 MG/DL (ref 70–100)
GLUCOSE SERPL-MCNC: 146 MG/DL (ref 70–100)
HGB UR QL STRIP: 11.4 G/DL (ref 14–18)
HGB UR QL STRIP: 12 G/DL (ref 14–18)
LIPASE SERPL-CCNC: 22 U/L (ref 22–51)
LYMPH ABN NFR BLD MANUAL: 0 %
LYMPHOBLASTS # BLD: 12 %
LYMPHOCYTES # BLD MANUAL: 0.2 10^3/UL (ref 1.5–3.5)
LYMPHOCYTES NFR BLD AUTO: 6.3 %
MAGNESIUM SERPL-MCNC: 1.7 MG/DL (ref 1.7–2.8)
MANUAL DIF COMMENT BLD-IMP: (no result)
MCH RBC QN AUTO: 31.8 PG (ref 27–31)
MCH RBC QN AUTO: 32 PG (ref 27–31)
MCHC RBC AUTO-ENTMCNC: 33.4 G/DL (ref 32–36)
MCHC RBC AUTO-ENTMCNC: 34.8 G/DL (ref 32–36)
MCV RBC AUTO: 91.6 FL (ref 80–94)
MCV RBC AUTO: 95.7 FL (ref 80–94)
MONOCYTES # BLD MANUAL: 0 10^3/UL (ref 0–1)
MONOCYTES NFR BLD AUTO: 2.1 %
NEUTROPHILS # SNV AUTO: 1.7 X10^3/UL (ref 4.8–10.8)
NEUTROPHILS # SNV AUTO: 3.5 X10^3/UL (ref 4.8–10.8)
NEUTROPHILS NFR BLD AUTO: 89.5 %
NEUTROPHILS NFR BLD MANUAL: 1.4 10^3/UL (ref 1.5–6.6)
NEUTS BAND NFR BLD MANUAL: 80 %
NEUTS BAND NFR BLD: 5 %
PDW BLD AUTO: 7.2 FL (ref 7.4–11.4)
PDW BLD AUTO: 7.4 FL (ref 7.4–11.4)
PLATELET # BLD: 207 10^3/UL (ref 130–450)
PLATELET # BLD: 214 10^3/UL (ref 130–450)
PLATELET BLD QL SMEAR: (no result)
PROT SPEC-MCNC: 4.5 G/DL (ref 6.7–8.2)
PROT SPEC-MCNC: 4.9 G/DL (ref 6.7–8.2)
RBC MAR: 3.56 10^6/UL (ref 4.7–6.1)
RBC MAR: 3.78 10^6/UL (ref 4.7–6.1)
RBC MORPH BLD: (no result)
SODIUM SERPLBLD-SCNC: 133 MMOL/L (ref 135–145)
SODIUM SERPLBLD-SCNC: 135 MMOL/L (ref 135–145)
SODIUM SERPLBLD-SCNC: 135 MMOL/L (ref 135–145)

## 2019-02-15 PROCEDURE — 87640 STAPH A DNA AMP PROBE: CPT

## 2019-02-15 PROCEDURE — 84484 ASSAY OF TROPONIN QUANT: CPT

## 2019-02-15 PROCEDURE — 83735 ASSAY OF MAGNESIUM: CPT

## 2019-02-15 PROCEDURE — 96365 THER/PROPH/DIAG IV INF INIT: CPT

## 2019-02-15 PROCEDURE — 0DTF0ZZ RESECTION OF RIGHT LARGE INTESTINE, OPEN APPROACH: ICD-10-PCS | Performed by: SURGERY

## 2019-02-15 PROCEDURE — 96361 HYDRATE IV INFUSION ADD-ON: CPT

## 2019-02-15 PROCEDURE — 85027 COMPLETE CBC AUTOMATED: CPT

## 2019-02-15 PROCEDURE — 0DN80ZZ RELEASE SMALL INTESTINE, OPEN APPROACH: ICD-10-PCS | Performed by: SURGERY

## 2019-02-15 PROCEDURE — 83690 ASSAY OF LIPASE: CPT

## 2019-02-15 PROCEDURE — 0DQ80ZZ REPAIR SMALL INTESTINE, OPEN APPROACH: ICD-10-PCS | Performed by: SURGERY

## 2019-02-15 PROCEDURE — 96367 TX/PROPH/DG ADDL SEQ IV INF: CPT

## 2019-02-15 PROCEDURE — 80053 COMPREHEN METABOLIC PANEL: CPT

## 2019-02-15 PROCEDURE — 93005 ELECTROCARDIOGRAM TRACING: CPT

## 2019-02-15 PROCEDURE — 83605 ASSAY OF LACTIC ACID: CPT

## 2019-02-15 PROCEDURE — 71045 X-RAY EXAM CHEST 1 VIEW: CPT

## 2019-02-15 PROCEDURE — 82330 ASSAY OF CALCIUM: CPT

## 2019-02-15 PROCEDURE — 84100 ASSAY OF PHOSPHORUS: CPT

## 2019-02-15 PROCEDURE — 80048 BASIC METABOLIC PNL TOTAL CA: CPT

## 2019-02-15 PROCEDURE — 36415 COLL VENOUS BLD VENIPUNCTURE: CPT

## 2019-02-15 PROCEDURE — 99285 EMERGENCY DEPT VISIT HI MDM: CPT

## 2019-02-15 PROCEDURE — 74177 CT ABD & PELVIS W/CONTRAST: CPT

## 2019-02-15 PROCEDURE — 85025 COMPLETE CBC W/AUTO DIFF WBC: CPT

## 2019-02-15 RX ADMIN — POTASSIUM CHLORIDE, DEXTROSE MONOHYDRATE AND SODIUM CHLORIDE SCH MLS/HR: 150; 5; 450 INJECTION, SOLUTION INTRAVENOUS at 09:56

## 2019-02-15 RX ADMIN — FAMOTIDINE SCH MLS/HR: 20 INJECTION, SOLUTION INTRAVENOUS at 21:14

## 2019-02-15 RX ADMIN — POTASSIUM CHLORIDE, DEXTROSE MONOHYDRATE AND SODIUM CHLORIDE SCH MLS/HR: 150; 5; 450 INJECTION, SOLUTION INTRAVENOUS at 21:13

## 2019-02-15 RX ADMIN — POTASSIUM CHLORIDE SCH MLS/HR: 7.46 INJECTION, SOLUTION INTRAVENOUS at 13:02

## 2019-02-15 RX ADMIN — SODIUM CHLORIDE SCH MLS/HR: 9 INJECTION, SOLUTION INTRAVENOUS at 09:59

## 2019-02-15 RX ADMIN — FAMOTIDINE SCH MLS/HR: 20 INJECTION, SOLUTION INTRAVENOUS at 12:33

## 2019-02-15 RX ADMIN — SODIUM CHLORIDE, PRESERVATIVE FREE SCH ML: 5 INJECTION INTRAVENOUS at 13:05

## 2019-02-15 RX ADMIN — POTASSIUM CHLORIDE SCH MLS/HR: 7.46 INJECTION, SOLUTION INTRAVENOUS at 16:28

## 2019-02-15 RX ADMIN — SODIUM CHLORIDE SCH MLS/HR: 9 INJECTION, SOLUTION INTRAVENOUS at 20:43

## 2019-02-15 RX ADMIN — ENOXAPARIN SODIUM SCH: 100 INJECTION SUBCUTANEOUS at 09:00

## 2019-02-15 RX ADMIN — SODIUM CHLORIDE SCH MLS/HR: 9 INJECTION, SOLUTION INTRAVENOUS at 15:36

## 2019-02-15 RX ADMIN — SODIUM CHLORIDE, PRESERVATIVE FREE SCH: 5 INJECTION INTRAVENOUS at 20:21

## 2019-02-15 RX ADMIN — POTASSIUM CHLORIDE SCH MLS/HR: 7.46 INJECTION, SOLUTION INTRAVENOUS at 15:25

## 2019-02-15 RX ADMIN — POTASSIUM CHLORIDE SCH MLS/HR: 7.46 INJECTION, SOLUTION INTRAVENOUS at 14:13

## 2019-02-15 NOTE — IMMEDIATE POSTOPERATIVE NOTE
Immediate Postoperative Note





- Procedure Note


Procedure Date: 02/15/19


Pre-Op Diagnosis: pneumoperitoneum


Procedure: right hemicolectomy


Post-Op Diagnosis: perforated cecal tumor


Primary Surgeon: shankar


Anesthesia Type: General ET tube


Complications: No complications


Estimated Blood Loss (in cc): 100


Specimens and Cultures: 





right colon


Plan of Care: 





ICU

## 2019-02-15 NOTE — ANESTHESIA PROCEDURE NOTE
Anesth Central Line Template





- Central Line


Central Line Preparation: Consent Obtained


Central line location: Right IJ


Central line type: Triple lumen


Central line catheter tip site resides: Superior vena cava (SVC)


Central line aftercare: Chlorhexidine disc placed, Secured, Placement confirmed,

Bundle checklist complete, Pt tolerated well

## 2019-02-15 NOTE — PROCEDURE REPORT
DATE OF SERVICE: 02/15/2019

Physician: Medhat Castellanos MD

 

PREOPERATIVE DIAGNOSIS:  Pneumoperitoneum.

 

POSTOPERATIVE DIAGNOSIS:  Perforated cecal tumor.

 

PROCEDURES PERFORMED

1.  Exploratory laparotomy.

2.  Extensive lysis of adhesions.

3.  Repair of serosal tears x2.

4.  Right hemicolectomy with primary anastomosis.

 

INDICATIONS:  The patient is an 83-year-old man who presented to the hospital 
about a week ago with a small-bowel obstruction.  This was treated 
conservatively, and it resolved without surgery.  He was discharged home.  He 
states that he was fine at home until late last night, he had sudden onset of 
pain, returned to the ER and CT scan showed pneumoperitoneum.  The patient also 
had diffuse peritonitis.

 

PROCEDURE IN DETAIL:  The risks and benefits were explained to the patient, who 
agreed to the procedure.  He was taken to the operating room and placed under 
general anesthesia and intubated.  The abdomen was prepped and draped.  A 
timeout was performed, and everyone in the room agreed to the procedure.

 

We began making a midline laparotomy incision.  We carried this down inside the 
peritoneal cavity. We encountered air and succus upon entry.  The abdominal 
cavity was washed clean.  The small bowel was eviscerated, although there were 
numerous dense adhesions that were taken down prior to being able to eviscerate 
all of the bowel.  There were also two internal hernias made from the adhesions 
that had to be taken down.  There were two small serosal tears on the bowel 
after all this was done.  These were repaired using Vicryl.

 

At this point, all the bowel was eviscerated, and the adhesions were down.  No 
defects were seen in the small bowel.  The prior anastomosis from a prior ileal 
resection was seen in the terminal ileum.  A large tumor was noted in the cecum 
with an obvious perforation.  He also had matted hard lymph nodes up in the 
small bowel near the cecum and in the right colon.  The rest of the cecum was 
healthy and decompressed.  There was no tumor involvement seen in the rest of 
the abdomen.

 

At this point, I elected to perform a right hemicolectomy to remove the tumor 
and the pathologic lymph nodes.  We transected proximal to the old ileal 
anastomosis to remove all of the pathologic lymph nodes, and transected the 
transverse colon between the right and left branches of the middle colic artery.
 These was transected using a blue load SOHAIL stapler.  This removed more small 
bowel than for a standard right, maybe 15 cm.  The colonic attachments of the 
peritoneum were then taken down, as well as to the liver, duodenum and omentum 
using a combination of blunt and sharp dissection.  The mesentery was taken 
using a LigaSure device as low as possible.  The specimen was then sent to 
pathology.  

 

A side-to-side anastomosis was performed of the ileum to the transverse colon 
using a blue SOHAIL load 75, with a TA to close the defect.  

 

The patient was stable at this point, with minimal to no need for pressors 
during the entire case, and decided it was safe to perform an anastomosis, 
especially considering a right hemicolectomy.  The small bowel was then examined
again in its entirety, and found to have no signs of ischemia, perforation or 
damage.  No residual bleeding was noted.  The fascia was then closed using #1 
looped PDS, and the overlying skin with interrupted staples.  This terminated 
the procedure.  The patient tolerated it well.  He will go to the ICU for 
expectant management.  Instrument and lap counts were correct.  Dr. Castellanos 
was present for the entire procedure.  

 

ESTIMATED BLOOD LOSS:  100 mL.

 

SPECIMEN:  Right hemicolectomy.

 

COMPLICATIONS:  None.

 

PLAN:  ICU. 

 

 

DD: 02/15/2019 07:59

TD: 02/15/2019 08:09

Job #: [id number]

JEFF

## 2019-02-15 NOTE — CT REPORT
Reason:  abd. pain

Procedure Date:  02/15/2019   

Accession Number:  [id number] / [id number]                    

Procedure:  CT  - Abdomen/Pelvis W/ CPT Code:  

 

FULL RESULT:

 

 

EXAM:

CT ABDOMEN AND PELVIS

 

EXAM DATE: 2/15/2019 02:23 AM.

 

CLINICAL HISTORY: Abd.  pain.

 

COMPARISONS: ABDOMEN/PELVIS W/ 02/08/2019 12:14 PM.

 

TECHNIQUE: Routine helical CT imaging was performed through the abdomen 

and pelvis. IV contrast: 80ML OPTIRAY 320. Enteric contrast: No. 

Reconstructions: Coronal and sagittal.

 

In accordance with CT protocol optimization, one or more of the following 

dose reduction techniques were utilized for this exam: automated exposure 

control, adjustment of mA and/or KV based on patient size, or use of 

iterative reconstructive technique.

 

FINDINGS:

Lung Bases: Small pleural effusions are noted at the lung bases. There is 

an area of infiltrate or atelectasis at the right lung base and minimal 

atelectasis at the left lung base.

 

Liver: The liver is no normal in size. There are several small hepatic 

cysts.

 

Gallbladder/Bile Ducts: Unremarkable.

 

Spleen: Normal.

 

Pancreas: Normal.

 

Adrenal Glands: Normal.

 

Kidneys: Normal. No masses or hydronephrosis.

 

Peritoneal Cavity/Bowel: The stomach is distended with contrast. There is 

contrast in several loops of proximal and mid small bowel. Compared to 

the prior study the bowel is moderately decompressed. There are, however, 

edematous changes in multiple loops of mid to distal small bowel. In 

addition there is a suggestion of possible developing pneumatosis within 

several of these bowel loops in the right mid and right lower quadrant.

 

There is free intra-abdominal air and moderate amount of abdominal and 

pelvic ascites. There is a focal area of frothy gas pattern in the right 

lower quadrant although it can determine whether this is extraluminal or 

not. Clearly there has been bowel perforation and the perforation is 

likely related to the right lower quadrant given the appearance of the 

bowel and the prior study.

 

Pelvic Organs: Multiple colonic diverticula seen in the region of the 

sigmoid colon.

 

Urinary bladder is grossly distended.

 

Vasculature: Extensive atherosclerotic vascular calcifications.

 

Bones: The bones are osteopenic. There is depression of the endplates at 

the T11, T12, L3, and L5 levels.

 

Other: None.

IMPRESSION:

1. Free intra-abdominal air and a moderate amount of fluid within the 

abdomen and pelvis compatible with bowel perforation. The perforation is 

likely in the region of the right lower quadrant given the appearance of 

the bowel on the prior study.

 

2. Moderate to marked edematous changes in multiple loops of mid to 

distal small bowel. Suspicion for several loops demonstrating early 

changes of pneumatosis suggesting developing necrotic bowel.

 

RADIA

 

The above findings  were discussed with Kike Alcantar by Dr. Juan Vidal at 02:45 hrs on 2/15/19.

## 2019-02-15 NOTE — CONSULTATION NOTE
DATE OF SERVICE: 02/15/2019

Physician: Marquita Villalta MD

 

HISTORY OF PRESENT ILLNESS:  This is an 83-year-old white male with a history of lymphoma and recentl
y admitted here for small-bowel obstruction that resolved conservatively with bowel rest and Gastrogr
afin contrast imaging, and the contrast did pass through the bowels.  He was stabilized and discharge
d home.  He was home for several days and then developed a sudden onset of abdominal pain, drove hims
elf to the emergency room, where imaging showed that he had free air in the abdomen, indicating a per
foration, and he was taken from the ER to the OR for exploration.  He did have perforation, he was la
vaged, and he had a bowel resection with anastomosis done.  In the operative period and postop, he ha
s dropped his blood pressure to 80 and 90 systolic, despite getting at least 2 liters of crystalloids
 as well as being started on pressors using Edison-Synephrine.  He is being transferred into the ICU wit
h an NG tube to suction, Almeida catheter is in place, and he is still sedated but has been extubated.

 

PAST MEDICAL HISTORY

1.  Lymphoma, not on current chemotherapy of any kind. 

2.  Recent small-bowel obstruction, treated conservatively. 

3.  Remote history of colon surgery.

 

ALLERGIES:  NONE.

 

MEDICATIONS PRIOR TO ADMISSION

1.  Calcium carbonate.

2.  Baby aspirin 2 tablets daily.

3.  Tylenol and Benadryl p.r.n.

4.  MiraLax daily.

5.  Zofran p.r.n.

6.  Pepcid 20 mg b.i.d.

 

FAMILY HISTORY:  Chart review showed there were no inherited diseases.

 

SOCIAL HISTORY:  Chart review indicates that he is a smoker, does drink alcohol.

 

REVIEW OF SYSTEMS:  A comprehensive review of systems was done with chart review.  The pertinent posi
tives are listed, the rest are negative.

 

PHYSICAL EXAMINATION

GENERAL:  Sedated white male.  He is in no distress, supine in bed.

VITAL SIGNS:  Blood pressure has improved into the 117/60 range, heart rate is 60-70, in sinus rhythm
.  He is afebrile and was afebrile on admission.  He is on 6 liters nasal cannula oxygen with 100% sa
turation.

HEENT:  Shows dry oral mucosa.

NECK:  Without JVD in a supine position.

CHEST:  Clear at the anterior bases.

HEART:  Sounds normal.  No murmurs, rubs or gallops.

ABDOMEN:  Soft with decreased bowel sounds.

EXTREMITIES:  Show no clubbing, cyanosis, or edema.

NEUROLOGIC:  Sedated.

 

LABORATORY DATA:  Sodium 135, potassium 3.1, normal BUN and creatinine.  Lactic acid 2.2.  Normal jonathan
er tests, normal lipase.  White blood count 1.7 with low neutrophils of 1.4, hemoglobin 11.4, platele
t count normal at 214.

 

IMAGING:  Abdomen and pelvis CT showed free intra-abdominal air and fluid in the abdomen and pelvis c
ompatible with a bowel perforation, edematous changes and multiple loops of mid to distal small bowel
.

 

IMPRESSION/DIAGNOSES

1.  Perforated bowel, status post emergency bowel resection.

2.  Hypotension, rule out septic shock, cardiogenic shock, hypovolemic shock.

3.  Recent small-bowel obstruction that was managed conservatively and improved.

4.  History of lymphoma.

 

PLAN

1.  Agree with close management in the ICU.  If the blood pressure continues with hypotensive reading
s, then he will need pressor support, a CVP line, Levophed will be ordered to keep MAP greater than 6
5.

2.  Obtain an EKG and cycle troponins.

3.  Obtain followup lactic acid level to watch for sepsis.  

4.  Continue with empiric antibiotics that were started by Surgery.

5.  Continue with other surgical management, including NG decompression and ICU management with caref
ul I's and O's.

6.  Continue with peripheral IV hydration, planning for resuming a diet and any oral meds when the GI
 tract has resolution.

 

CODE STATUS:  FULL CODE.

 

DEEP VENOUS THROMBOSIS PROPHYLAXIS:  Lovenox.

 

Thank you for allowing us to participate in the care of this patient.

 

 

DD: 02/15/2019 14:12

TD: 02/15/2019 14:22

Job #: [id number]

## 2019-02-15 NOTE — ED PHYSICIAN DOCUMENTATION
PD HPI ABD PAIN





- Stated complaint


Stated Complaint: ABD PAIN





- Chief complaint


Chief Complaint: Abd Pain





- History obtained from


History obtained from: Patient





- History of Present Illness


Timing - onset: Today


Timing - duration: Hours


Timing - details: Gradual onset


Pain level max: 10


Pain level now: 10


Quality: Pain


Location: All over / everywhere


Radiation: Other (does not radiate)


Improved by: Laying still


Worsened by: Moving, Palpation


Associated symptoms: Nausea.  No: Fever, Vomiting, Constipation (small BM this 

morning)


Similar symptoms before: Diagnosis (SBO)


Recently seen: Admitted





- Additional information


Additional information: 





admitted to [facility] 2/8 for SBO, discharged 2/11 after several bowel movements and 

gradually advancing diet. Patient says he felt well at time of discharge but 

since earlier today has had gradually worsening generalized abdominal pain. 

BIBA, medics report that SBP was initially 70s but has steadily improved with IV

fluids and is 90s SBP on arrival to ED.





Review of Systems


Constitutional: denies: Fever, Chills, Sweats


Eyes: reports: Reviewed and negative


Ears: reports: Reviewed and negative


Nose: reports: Reviewed and negative


Throat: reports: Reviewed and negative


Cardiac: reports: Reviewed and negative


Respiratory: reports: Reviewed and negative


GI: reports: Abdominal Pain, Nausea.  denies: Abdominal Swelling, Vomiting, 

Constipation, Diarrhea, Hematemesis, Bloody / black stool


: denies: Dysuria, Frequency


Skin: reports: Reviewed and negative


Musculoskeletal: reports: Reviewed and negative


Neurologic: reports: Reviewed and negative





PD PAST MEDICAL HISTORY





- Past Medical History


Past Medical History: Yes


Cardiovascular: Hypertension


Respiratory: None


Neuro: None


Endocrine/Autoimmune: None


GI: Other


: None


HEENT: None


Psych: Depression


Musculoskeletal: None


Derm: None





- Past Surgical History


Past Surgical History: Yes


General: Bowel surgery, Colonoscopy





- Present Medications


Home Medications: 


                                Ambulatory Orders











 Medication  Instructions  Recorded  Confirmed


 


Aspirin 162 mg PO DAILY 07/10/13 02/15/19


 


Acetaminophen/Diphenhydramine 1 each PO QPM PRN 02/08/19 02/15/19





[Tylenol Pm Ex-Strength Caplet]   


 


Calcium Carbonate [Tums (Calcium 500 mg PO BID PRN  tablet 02/11/19 02/15/19





Carbonate 500mg)]   


 


Famotidine [Pepcid] 20 mg PO BID  tablet 02/11/19 02/15/19


 


Ondansetron HCl [Zofran] 4 mg PO Q8HR #10 tablet 02/11/19 02/15/19


 


Polyethylene Glycol 3350 [Miralax] 17 gm PO DAILY #7 packet 02/11/19 02/15/19














- Allergies


Allergies/Adverse Reactions: 


                                    Allergies











Allergy/AdvReac Type Severity Reaction Status Date / Time


 


No Known Drug Allergies Allergy   Verified 02/15/19 00:18














- Social History


Does the pt smoke?: Yes


Smoking Status: Current every day smoker


Does the pt drink ETOH?: No


Does the pt have substance abuse?: No





- Immunizations


Immunizations are current?: No





- POLST


Patient has POLST: No





PD ED PE NORMAL





- Vitals


Vital signs reviewed: Yes





- General


General: Alert and oriented X 3, Well developed/nourished, Other (appears to be 

uncomfortable due to pain)





- HEENT


HEENT: Other (dry mucous membranes)





- Neck


Neck: Supple, no meningeal sign





- Cardiac


Cardiac: RRR, No murmur





- Respiratory


Respiratory: No respiratory distress, Clear bilaterally





- Abdomen


Abdomen: Soft, Non distended, Other (absent bowel sounds)





- Derm


Derm: Normal color, Warm and dry





- Extremities


Extremities: No edema





- Neuro


Neuro: Alert and oriented X 3





PD ED PE EXPANDED





- Abdomen


Abdomen: Absent BS, Tender to palpation, Rebound, Generalized/diffuse





Results





- Vitals


Vitals: 


                               Vital Signs - 24 hr











  02/15/19 02/15/19 02/15/19





  00:03 00:30 00:54


 


Temperature 36.2 C L  


 


Heart Rate 87 81 74


 


Respiratory 17 19 27 H





Rate   


 


Blood Pressure 95/73 95/73 92/61


 


O2 Saturation 94 94 94














  02/15/19 02/15/19 02/15/19





  01:39 02:19 03:02


 


Temperature 36.3 C L  36.0 C L


 


Heart Rate 76 72 74


 


Respiratory 23 20 24





Rate   


 


Blood Pressure 112/71 110/75 87/61 L


 


O2 Saturation 97 95 97














  02/15/19





  03:28


 


Temperature 36.2 C L


 


Heart Rate 72


 


Respiratory 25 H





Rate 


 


Blood Pressure 97/63


 


O2 Saturation 95








                                     Oxygen











O2 Source                      Room air

















- Labs


Labs: 


                                Laboratory Tests











  02/15/19 02/15/19 02/15/19





  00:20 00:20 00:45


 


WBC  1.7 L*  


 


RBC  3.56 L  


 


Hgb  11.4 L  


 


Hct  34.0 L  


 


MCV  95.7 H  


 


MCH  32.0 H  


 


MCHC  33.4  


 


RDW  14.1  


 


Plt Count  214  


 


MPV  7.4  


 


Neut # (Auto)  Not Reportable  


 


Lymph # (Auto)  Not Reportable  


 


Mono # (Auto)  Not Reportable  


 


Eos # (Auto)  Not Reportable  


 


Baso # (Auto)  Not Reportable  


 


Absolute Nucleated RBC  Not Reportable  


 


Total Counted  100  


 


Band Neuts % (Manual)  5  


 


Abnorm Lymph % (Manual)  0  


 


Nucleated RBC %  Not Reportable  


 


Neutrophils # (Manual)  1.4 L  


 


Lymphocytes # (Manual)  0.2 L  


 


Monocytes # (Manual)  0.0  


 


Eosinophils # (Manual)  0.0  


 


Basophils # (Manual)  0.0  


 


Differential Comment  MANUAL DIFFERENTIAL  


 


Platelet Estimate  NORMAL (130-450,000)  


 


RBC Morph Micro Appear  NORMAL APPEARANCE  


 


Sodium   135 


 


Potassium   3.1 L 


 


Chloride   102 


 


Carbon Dioxide   25 


 


Anion Gap   8.0 


 


BUN   14 


 


Creatinine   0.9 


 


Estimated GFR (MDRD)   81 L 


 


Glucose   114 H 


 


Lactic Acid    2.2


 


Calcium   7.8 L 


 


Total Bilirubin   1.0 


 


AST   21 


 


ALT   16 


 


Alkaline Phosphatase   52 


 


Total Protein   4.9 L 


 


Albumin   2.4 L 


 


Globulin   2.5 


 


Albumin/Globulin Ratio   1.0 


 


Lipase   22 














- Rads (name of study)


  ** CT A/P


Radiology: Prelim report reviewed, See rad report





PD MEDICAL DECISION MAKING





- ED course


Complexity details: reviewed old records, reviewed results, re-evaluated 

patient, considered differential, d/w patient, d/w family


ED course: 





CT shows SBO as well as free air c/w perforated viscus. D/W Dr. Castellanos who 

came to ED, evaluated patient, and will take to OR 





Departure





- Departure


Disposition: [facility] DC/Xfer


Clinical Impression: 


 Small bowel obstruction, Perforated bowel





Condition: Serious


Discharge Date/Time: 02/15/19 05:24

## 2019-02-15 NOTE — XRAY REPORT
Reason:  Line placement

Procedure Date:  02/15/2019   

Accession Number:  [id number] / [id number]                    

Procedure:  XR  - Chest for Line Placement CPT Code:  

 

FULL RESULT:

 

 

EXAM:

CHEST RADIOGRAPHY

 

EXAM DATE: 2/15/2019 09:52 PM.

 

CLINICAL HISTORY: Line placement.

 

COMPARISON: CHEST 2 VIEW 02/08/2019 8:00 PM.

 

TECHNIQUE: 1 view.

 

FINDINGS:

Lungs/Pleura: New diffuse bilateral airspace disease, could represent 

pulmonary edema and more moderate right lower lung airspace disease, 

could be focal edema or pneumonia. No pleural effusion or pneumothorax.

 

Mediastinum: Within exam limitations, the cardiomediastinal contour is 

normal.

 

Other: Right internal jugular central line with the tip at the mid one 

third superior vena cava. Nasogastric tube extends into the stomach.

 

IMPRESSION: Lungs/Pleura: New diffuse bilateral airspace disease, could 

represent pulmonary edema and more moderate right lower lung airspace 

disease, could be focal edema or pneumonia. No pleural effusion or 

pneumothorax.

 

Mediastinum: Within exam limitations, the cardiomediastinal contour is 

normal.

 

Other: Right internal jugular central line with the tip at the mid one 

third superior vena cava. Nasogastric tube extends into the stomach.

 

 

RADIA

## 2019-02-16 LAB
ALBUMIN DIAFP-MCNC: 1.9 G/DL (ref 3.2–5.5)
ALBUMIN/GLOB SERPL: 0.7 {RATIO} (ref 1–2.2)
ALP SERPL-CCNC: 46 IU/L (ref 42–121)
ALT SERPL W P-5'-P-CCNC: 15 IU/L (ref 10–60)
ANION GAP SERPL CALCULATED.4IONS-SCNC: 6 MMOL/L (ref 6–13)
AST SERPL W P-5'-P-CCNC: 18 IU/L (ref 10–42)
BASOPHILS NFR BLD AUTO: 0 %
BASOPHILS NFR BLD AUTO: 0 10^3/UL (ref 0–0.1)
BASOPHILS NFR BLD AUTO: 0 10^3/UL (ref 0–0.1)
BASOPHILS NFR BLD AUTO: 0.1 %
BILIRUB BLD-MCNC: 0.5 MG/DL (ref 0.2–1)
BUN SERPL-MCNC: 12 MG/DL (ref 6–20)
CALCIUM UR-MCNC: 7.2 MG/DL (ref 8.5–10.3)
CHLORIDE SERPL-SCNC: 106 MMOL/L (ref 101–111)
CO2 SERPL-SCNC: 23 MMOL/L (ref 21–32)
CREAT SERPLBLD-SCNC: 0.7 MG/DL (ref 0.6–1.2)
EOSINOPHIL # BLD AUTO: 0 10^3/UL (ref 0–0.7)
EOSINOPHIL # BLD AUTO: 0 10^3/UL (ref 0–0.7)
EOSINOPHIL NFR BLD AUTO: 0.1 %
EOSINOPHIL NFR BLD AUTO: 0.1 %
ERYTHROCYTE [DISTWIDTH] IN BLOOD BY AUTOMATED COUNT: 14.1 % (ref 12–15)
ERYTHROCYTE [DISTWIDTH] IN BLOOD BY AUTOMATED COUNT: 14.2 % (ref 12–15)
GFRSERPLBLD MDRD-ARVRAT: 108 ML/MIN/{1.73_M2} (ref 89–?)
GLOBULIN SER-MCNC: 2.8 G/DL (ref 2.1–4.2)
GLUCOSE SERPL-MCNC: 126 MG/DL (ref 70–100)
HGB UR QL STRIP: 10.6 G/DL (ref 14–18)
HGB UR QL STRIP: 10.9 G/DL (ref 14–18)
LYMPHOCYTES # SPEC AUTO: 0.4 10^3/UL (ref 1.5–3.5)
LYMPHOCYTES # SPEC AUTO: 0.4 10^3/UL (ref 1.5–3.5)
LYMPHOCYTES NFR BLD AUTO: 2.6 %
LYMPHOCYTES NFR BLD AUTO: 2.8 %
MAGNESIUM SERPL-MCNC: 1.6 MG/DL (ref 1.7–2.8)
MCH RBC QN AUTO: 31.7 PG (ref 27–31)
MCH RBC QN AUTO: 31.8 PG (ref 27–31)
MCHC RBC AUTO-ENTMCNC: 33.8 G/DL (ref 32–36)
MCHC RBC AUTO-ENTMCNC: 34.1 G/DL (ref 32–36)
MCV RBC AUTO: 93 FL (ref 80–94)
MCV RBC AUTO: 94 FL (ref 80–94)
MONOCYTES # BLD AUTO: 0.3 10^3/UL (ref 0–1)
MONOCYTES # BLD AUTO: 0.3 10^3/UL (ref 0–1)
MONOCYTES NFR BLD AUTO: 2.1 %
MONOCYTES NFR BLD AUTO: 2.5 %
NEUTROPHILS # BLD AUTO: 13.2 10^3/UL (ref 1.5–6.6)
NEUTROPHILS # BLD AUTO: 14.9 10^3/UL (ref 1.5–6.6)
NEUTROPHILS # SNV AUTO: 13.9 X10^3/UL (ref 4.8–10.8)
NEUTROPHILS # SNV AUTO: 15.7 X10^3/UL (ref 4.8–10.8)
NEUTROPHILS NFR BLD AUTO: 94.8 %
NEUTROPHILS NFR BLD AUTO: 94.9 %
PDW BLD AUTO: 7.3 FL (ref 7.4–11.4)
PDW BLD AUTO: 7.7 FL (ref 7.4–11.4)
PH BLDV: 7.36 [PH] (ref 7.31–7.41)
PHOSPHATE BLD-MCNC: 2.5 MG/DL (ref 2.5–4.6)
PLATELET # BLD: 222 10^3/UL (ref 130–450)
PLATELET # BLD: 247 10^3/UL (ref 130–450)
PROT SPEC-MCNC: 4.7 G/DL (ref 6.7–8.2)
RBC MAR: 3.34 10^6/UL (ref 4.7–6.1)
RBC MAR: 3.42 10^6/UL (ref 4.7–6.1)
SODIUM SERPLBLD-SCNC: 135 MMOL/L (ref 135–145)

## 2019-02-16 RX ADMIN — SODIUM CHLORIDE, PRESERVATIVE FREE SCH ML: 5 INJECTION INTRAVENOUS at 08:55

## 2019-02-16 RX ADMIN — SODIUM CHLORIDE SCH MLS/HR: 9 INJECTION, SOLUTION INTRAVENOUS at 21:25

## 2019-02-16 RX ADMIN — FAMOTIDINE SCH MLS/HR: 20 INJECTION, SOLUTION INTRAVENOUS at 20:45

## 2019-02-16 RX ADMIN — SODIUM CHLORIDE, PRESERVATIVE FREE PRN ML: 5 INJECTION INTRAVENOUS at 03:35

## 2019-02-16 RX ADMIN — SODIUM CHLORIDE SCH MLS/HR: 9 INJECTION, SOLUTION INTRAVENOUS at 15:18

## 2019-02-16 RX ADMIN — SODIUM CHLORIDE, PRESERVATIVE FREE SCH ML: 5 INJECTION INTRAVENOUS at 18:20

## 2019-02-16 RX ADMIN — SODIUM CHLORIDE SCH MLS/HR: 9 INJECTION, SOLUTION INTRAVENOUS at 09:00

## 2019-02-16 RX ADMIN — POTASSIUM CHLORIDE, DEXTROSE MONOHYDRATE AND SODIUM CHLORIDE SCH MLS/HR: 150; 5; 450 INJECTION, SOLUTION INTRAVENOUS at 08:54

## 2019-02-16 RX ADMIN — Medication PRN UNIT: at 03:39

## 2019-02-16 RX ADMIN — POTASSIUM CHLORIDE, DEXTROSE MONOHYDRATE AND SODIUM CHLORIDE SCH MLS/HR: 150; 5; 450 INJECTION, SOLUTION INTRAVENOUS at 17:46

## 2019-02-16 RX ADMIN — SODIUM CHLORIDE, PRESERVATIVE FREE SCH ML: 5 INJECTION INTRAVENOUS at 03:35

## 2019-02-16 RX ADMIN — ENOXAPARIN SODIUM SCH MG: 100 INJECTION SUBCUTANEOUS at 09:04

## 2019-02-16 RX ADMIN — SODIUM CHLORIDE, PRESERVATIVE FREE PRN ML: 5 INJECTION INTRAVENOUS at 04:39

## 2019-02-16 RX ADMIN — FAMOTIDINE SCH MLS/HR: 20 INJECTION, SOLUTION INTRAVENOUS at 09:45

## 2019-02-16 RX ADMIN — SODIUM CHLORIDE SCH MLS/HR: 9 INJECTION, SOLUTION INTRAVENOUS at 03:34

## 2019-02-16 NOTE — PROVIDER PROGRESS NOTE
Assessment/Plan





- Problem List


(1) Perforated bowel


Assessment/Plan: 


Management per surgery.


Awaiting pathology report.








(2) Hypotension


Assessment/Plan: 


No evidence of MI or sepsis. He was likely volume depleted.


Pressors are off.


He is able to sit in a chair with stable BP


IV fluids continue whike he is npo and has ng tube.


Monitor CBC and BMP daily.


I updated the daughter and son in law at the patient's bedside today.








(3) Lymphoma


Assessment/Plan: 


Stable, patient not on active cancer management.








- Current Meds


Current Meds: 





                               Current Medications











Generic Name Dose Route Start Last Admin





  Trade Name Freq  PRN Reason Stop Dose Admin


 


Enoxaparin Sodium  40 mg  02/15/19 09:00  02/16/19 09:04





  Lovenox  SUBQ   40 mg





  DAILY LILLIANA   Administration





     





     





     





     


 


Heparin Sodium (Beef Lung)  30 - 50 unit  02/16/19 02:33  02/16/19 03:39





    IVP   30 unit





  PRN PRN   Administration





  Central Line Protocol (<24 hr)   





     





     





     


 


Potassium Chloride/Dextrose/Sod Cl  1,000 mls @ 100 mls/hr  02/15/19 09:00  

02/16/19 08:54





  D5.45ns W/20 Meq Kcl  IV   100 mls/hr





  .Q10H LILLIANA   Administration





     





     





     





     


 


Famotidine  50 mls @ 100 mls/hr  02/15/19 09:00  02/16/19 10:15





  Pepcid 20 Mg/50 Ml  IV   Infused





  BID LILLIANA   Infusion





     





     





     





     


 


Piperacillin Sod/Tazobactam  100 mls @ 200 mls/hr  02/15/19 09:00  02/16/19 

09:40





  Sod 3.375 gm/ Sodium Chloride  IV   Infused





  Q6H LILLIANA   Infusion





     





     





     





     


 


Norepinephrine Bitartrate 8 mg  250 mls @ 15 mls/hr  02/15/19 10:00  02/16/19 

06:06





  / Dextrose  IV   0 mcg/min





  .[id number] LILLIANA   0 mls/hr





     Titration





     





  Protocol   





  8 MCG/MIN   


 


Morphine Sulfate/Sodium Chloride  0 mg  02/15/19 08:14  02/15/19 13:48





  Morphine Pca (Use Pca Order Set)  IV   50 mg





  PCA PRN   Administration





  PAIN   





     





  Protocol   





     


 


Sodium Chloride  10 ml  02/15/19 09:00  02/16/19 08:55





  Normal Saline Flush 0.9%  IVP   30 ml





  0100,0900,1700 LILLIANA   Administration





     





     





     





     


 


Sodium Chloride  10 ml  02/15/19 08:02  02/16/19 04:39





  Normal Saline Flush 0.9%  IVP   10 ml





  PRN PRN   Administration





  AS NEEDED PER PROVIDER ORDERS   





     





     





     


 


Sodium Chloride  20 ml  02/16/19 02:33  02/16/19 03:35





  Normal Saline Flush 0.9%  IVP   20 ml





  PRN PRN   Administration





  After Blood Draw   





     





     





     














- Lab Result


Fish Bone Diagrams: 


                                 02/16/19 12:00





                                 02/16/19 03:30





- Additional Planning


My Orders: 





My Active Orders





02/16/19


Evaluate and Treat PT [PT] Routine 





02/16/19 02:33


Heparin Flush   30 - 50 unit IVP PRN PRN 


Sodium Chloride Flush 0.9% [Normal Saline Flush 0.9%]   20 ml IVP PRN PRN 





02/16/19 10:45


Incentive Spirometry - RT [RC] .TID 





02/17/19 05:00


CALCIUM, IONIZED ([facility]) [BG] DAILYLAB 


CMP [COMPREHENSIVE METABOLIC PANEL] [CHEM] DAILYLAB 





02/18/19 05:00


CALCIUM, IONIZED ([facility]) [BG] DAILYLAB 


CMP [COMPREHENSIVE METABOLIC PANEL] [CHEM] DAILYLAB 





02/19/19 05:00


CMP [COMPREHENSIVE METABOLIC PANEL] [CHEM] DAILYLAB 














Subjective





- Subjective


Patient Reports: Feeling Better


Nursing Reports: Other (Levophed weaned to off at 6 a.m. Pt tolerating sitting 

up in chair, has visitors at bedside.)





Objective


Vital Signs: 





                               Vital Signs - 24 hr











  02/15/19 02/15/19 02/15/19





  13:00 14:00 15:00


 


Temperature   


 


Heart Rate   


 


Heart Rate [ 74 80 84





Monitoring   





electrodes]   


 


Respiratory 18 18 23





Rate   


 


Blood Pressure 118/72 102/65 111/68





[Left Brachial   





artery]   


 


O2 Saturation 100 97 














  02/15/19 02/15/19 02/15/19





  16:00 17:00 18:00


 


Temperature 37.0 C  


 


Heart Rate   


 


Heart Rate [ 88 84 81





Monitoring   





electrodes]   


 


Respiratory 24 15 12





Rate   


 


Blood Pressure 96/59 L 101/62 85/52 L





[Left Brachial   





artery]   


 


O2 Saturation 96 100 99














  02/15/19 02/15/19 02/15/19





  19:00 19:15 19:30


 


Temperature   


 


Heart Rate   


 


Heart Rate [ 84  





Monitoring   





electrodes]   


 


Respiratory 13  





Rate   


 


Blood Pressure 79/49 L 77/51 L 80/50 L





[Left Brachial   





artery]   


 


O2 Saturation   














  02/15/19 02/15/19 02/15/19





  19:45 20:00 20:15


 


Temperature   


 


Heart Rate   


 


Heart Rate [   





Monitoring   





electrodes]   


 


Respiratory   





Rate   


 


Blood Pressure 80/48 L 90/46 L 91/44 L





[Left Brachial   





artery]   


 


O2 Saturation   














  02/15/19 02/15/19 02/15/19





  20:16 20:23 20:30


 


Temperature   


 


Heart Rate   


 


Heart Rate [   





Monitoring   





electrodes]   


 


Respiratory   





Rate   


 


Blood Pressure 91/49 L 83/49 L 80/46 L





[Left Brachial   





artery]   


 


O2 Saturation   














  02/15/19 02/15/19 02/15/19





  20:48 21:00 21:30


 


Temperature   


 


Heart Rate   


 


Heart Rate [ 81 96 





Monitoring   





electrodes]   


 


Respiratory 13 21 





Rate   


 


Blood Pressure 78/53 L 95/60 95/59 L





[Left Brachial   





artery]   


 


O2 Saturation 98 95 














  02/15/19 02/15/19 02/15/19





  22:00 22:18 23:00


 


Temperature   


 


Heart Rate   


 


Heart Rate [ 83  83





Monitoring   





electrodes]   


 


Respiratory 17 16 12





Rate   


 


Blood Pressure 118/67  114/65





[Left Brachial   





artery]   


 


O2 Saturation 97  96














  02/16/19 02/16/19 02/16/19





  00:00 01:00 01:20


 


Temperature 37.1 C  


 


Heart Rate   


 


Heart Rate [ 84 83 





Monitoring   





electrodes]   


 


Respiratory 13 12 22





Rate   


 


Blood Pressure 118/65 116/70 





[Left Brachial   





artery]   


 


O2 Saturation 98 100 














  02/16/19 02/16/19 02/16/19





  02:00 03:00 03:29


 


Temperature   


 


Heart Rate   


 


Heart Rate [ 83 89 





Monitoring   





electrodes]   


 


Respiratory 12 21 12





Rate   


 


Blood Pressure 125/67 127/74 





[Left Brachial   





artery]   


 


O2 Saturation 98 100 














  02/16/19 02/16/19 02/16/19





  03:50 03:55 04:00


 


Temperature   37 C


 


Heart Rate   


 


Heart Rate [ 83 82 82





Monitoring   





electrodes]   


 


Respiratory   10 L





Rate   


 


Blood Pressure 126/72 118/67 120/66





[Left Brachial   





artery]   


 


O2 Saturation   100














  02/16/19 02/16/19 02/16/19





  04:05 04:10 04:15


 


Temperature   


 


Heart Rate   


 


Heart Rate [ 82 82 82





Monitoring   





electrodes]   


 


Respiratory   10 L





Rate   


 


Blood Pressure 115/69 114/63 107/64





[Left Brachial   





artery]   


 


O2 Saturation   100














  02/16/19 02/16/19 02/16/19





  04:20 04:25 04:30


 


Temperature   


 


Heart Rate   


 


Heart Rate [ 82 83 83





Monitoring   





electrodes]   


 


Respiratory 10 L 11 L 10 L





Rate   


 


Blood Pressure 104/66 114/69 108/67





[Left Brachial   





artery]   


 


O2 Saturation 100 100 














  02/16/19 02/16/19 02/16/19





  04:35 04:40 04:45


 


Temperature   


 


Heart Rate   


 


Heart Rate [ 81 81 81





Monitoring   





electrodes]   


 


Respiratory 10 L 10 L 10 L





Rate   


 


Blood Pressure 103/68 101/66 108/69





[Left Brachial   





artery]   


 


O2 Saturation  98 














  02/16/19 02/16/19 02/16/19





  04:50 04:55 05:00


 


Temperature   


 


Heart Rate   


 


Heart Rate [ 81 82 82





Monitoring   





electrodes]   


 


Respiratory 10 L 11 L 10 L





Rate   


 


Blood Pressure 104/64 106/63 116/64





[Left Brachial   





artery]   


 


O2 Saturation   














  02/16/19 02/16/19 02/16/19





  05:05 05:10 05:15


 


Temperature   


 


Heart Rate   


 


Heart Rate [ 80 83 83





Monitoring   





electrodes]   


 


Respiratory 11 L 10 L 11 L





Rate   


 


Blood Pressure 107/62 108/63 102/64





[Left Brachial   





artery]   


 


O2 Saturation 95  














  02/16/19 02/16/19 02/16/19





  05:20 05:25 05:30


 


Temperature   


 


Heart Rate   


 


Heart Rate [ 82 82 81





Monitoring   





electrodes]   


 


Respiratory 12 11 L 11 L





Rate   


 


Blood Pressure 102/66 107/66 99/65





[Left Brachial   





artery]   


 


O2 Saturation   96














  02/16/19 02/16/19 02/16/19





  05:35 05:40 05:45


 


Temperature   


 


Heart Rate   


 


Heart Rate [ 83 83 93





Monitoring   





electrodes]   


 


Respiratory 12 11 L 20





Rate   


 


Blood Pressure 101/67 108/61 120/71





[Left Brachial   





artery]   


 


O2 Saturation  96 95














  02/16/19 02/16/19 02/16/19





  05:48 05:50 05:55


 


Temperature   


 


Heart Rate   


 


Heart Rate [  89 91





Monitoring   





electrodes]   


 


Respiratory 15 17 18





Rate   


 


Blood Pressure  114/76 114/74





[Left Brachial   





artery]   


 


O2 Saturation   














  02/16/19 02/16/19 02/16/19





  06:00 06:05 07:00


 


Temperature   


 


Heart Rate   


 


Heart Rate [ 92 87 84





Monitoring   





electrodes]   


 


Respiratory 13 18 10 L





Rate   


 


Blood Pressure 116/69 114/65 92/57 L





[Left Brachial   





artery]   


 


O2 Saturation  96 














  02/16/19 02/16/19 02/16/19





  08:00 09:00 10:00


 


Temperature 37.2 C  


 


Heart Rate   


 


Heart Rate [ 89 93 82





Monitoring   





electrodes]   


 


Respiratory 16 19 19





Rate   


 


Blood Pressure 107/70 100/61 96/61





[Left Brachial   





artery]   


 


O2 Saturation 100  100














  02/16/19 02/16/19 02/16/19





  10:04 11:00 12:00


 


Temperature 37.2 C  


 


Heart Rate 89  


 


Heart Rate [  92 96





Monitoring   





electrodes]   


 


Respiratory 20 18 20





Rate   


 


Blood Pressure  97/56 L 101/68





[Left Brachial   





artery]   


 


O2 Saturation 97 98 








                                     Oxygen











O2 Source                      Room air














I&O (Last 24 Hrs): 





                          Intake and Output Totals x24h











 02/14/19 02/15/19 02/16/19





 23:59 23:59 23:59


 


Intake Total  4580 1771.313


 


Output Total  578 1199


 


Balance  4002 572.313











General: Alert, Oriented x3


HEENT: Other (ng tube in place)


Neuro: Non Focal


Cardiovascular: Regular rate


Respiratory: No respiratory distress


Abdomen: Soft


Extremities: No edema





- Results


Results: 





                               Laboratory Results











WBC  13.9 x10^3/uL (4.8-10.8)  H  02/16/19  12:00    


 


RBC  3.34 10^6/uL (4.70-6.10)  L  02/16/19  12:00    


 


Hgb  10.6 g/dL (14.0-18.0)  L  02/16/19  12:00    


 


Hct  31.0 % (42.0-52.0)  L  02/16/19  12:00    


 


MCV  93.0 fL (80.0-94.0)   02/16/19  12:00    


 


MCH  31.7 pg (27.0-31.0)  H  02/16/19  12:00    


 


MCHC  34.1 g/dL (32.0-36.0)   02/16/19  12:00    


 


RDW  14.1 % (12.0-15.0)   02/16/19  12:00    


 


Plt Count  222 10^3/uL (130-450)   02/16/19  12:00    


 


MPV  7.3 fL (7.4-11.4)  L  02/16/19  12:00    


 


Neut # (Auto)  13.2 10^3/uL (1.5-6.6)  H  02/16/19  12:00    


 


Lymph # (Auto)  0.4 10^3/uL (1.5-3.5)  L  02/16/19  12:00    


 


Mono # (Auto)  0.3 10^3/uL (0.0-1.0)   02/16/19  12:00    


 


Eos # (Auto)  0.0 10^3/uL (0.0-0.7)   02/16/19  12:00    


 


Baso # (Auto)  0.0 10^3/uL (0.0-0.1)   02/16/19  12:00    


 


Absolute Nucleated RBC  0.00 x10^3/uL  02/16/19  12:00    


 


Total Counted  100   02/15/19  00:20    


 


Band Neuts % (Manual)  5 % (0-10)  02/15/19  00:20    


 


Abnorm Lymph % (Manual)  0 %  02/15/19  00:20    


 


Nucleated RBC %  0.0 /100WBC  02/16/19  12:00    


 


Neutrophils # (Manual)  1.4 10^3/uL (1.5-6.6)  L  02/15/19  00:20    


 


Lymphocytes # (Manual)  0.2 10^3/uL (1.5-3.5)  L  02/15/19  00:20    


 


Monocytes # (Manual)  0.0 10^3/uL (0.0-1.0)   02/15/19  00:20    


 


Eosinophils # (Manual)  0.0 10^3/uL (0-0.7)   02/15/19  00:20    


 


Basophils # (Manual)  0.0 10^3/uL (0-0.1)   02/15/19  00:20    


 


Differential Comment  MANUAL DIFFERENTIAL   02/15/19  00:20    


 


Platelet Estimate  NORMAL (130-450,000)  (NORMAL)   02/15/19  00:20    


 


RBC Morph Micro Appear  NORMAL APPEARANCE  (NORMAL)   02/15/19  00:20    


 


VBG pH  7.360  (7.31-7.41)   02/16/19  03:30    


 


Ionized Calcium  1.03 mmol/L (1.15-1.33)  L  02/16/19  03:30    


 


Sodium  135 mmol/L (135-145)   02/16/19  03:30    


 


Potassium  4.1 mmol/L (3.5-5.0)   02/16/19  03:30    


 


Chloride  106 mmol/L (101-111)   02/16/19  03:30    


 


Carbon Dioxide  23 mmol/L (21-32)   02/16/19  03:30    


 


Anion Gap  6.0  (6-13)   02/16/19  03:30    


 


BUN  12 mg/dL (6-20)   02/16/19  03:30    


 


Creatinine  0.7 mg/dL (0.6-1.2)   02/16/19  03:30    


 


Estimated GFR (MDRD)  108  (>89)   02/16/19  03:30    


 


Glucose  126 mg/dL ()  H  02/16/19  03:30    


 


Lactic Acid  1.2 mmol/L (0.5-2.2)   02/16/19  06:01    


 


Calcium  7.2 mg/dL (8.5-10.3)  L  02/16/19  03:30    


 


Phosphorus  2.5 mg/dL (2.5-4.6)   02/16/19  03:30    


 


Magnesium  1.6 mg/dL (1.7-2.8)  L  02/16/19  03:30    


 


Total Bilirubin  0.5 mg/dL (0.2-1.0)   02/16/19  03:30    


 


AST  18 IU/L (10-42)   02/16/19  03:30    


 


ALT  15 IU/L (10-60)   02/16/19  03:30    


 


Alkaline Phosphatase  46 IU/L ()   02/16/19  03:30    


 


Troponin I  < 0.04 ng/mL (<0.49)   02/15/19  14:22    


 


Total Protein  4.7 g/dL (6.7-8.2)  L  02/16/19  03:30    


 


Albumin  1.9 g/dL (3.2-5.5)  L  02/16/19  03:30    


 


Globulin  2.8 g/dL (2.1-4.2)   02/16/19  03:30    


 


Albumin/Globulin Ratio  0.7  (1.0-2.2)  L  02/16/19  03:30    


 


Lipase  22 U/L (22-51)   02/15/19  00:20    














- Procedures


Procedures: 





Procedures





INCIS W REM OF FORIEGN BODY OR DEV FROM SKIN & SUBCUT TISSUE (05/30/14)

## 2019-02-16 NOTE — PROVIDER PROGRESS NOTE
Subjective





- Prog Note Date


Prog Note Date: 02/16/19


Prog Note Time: 08:54





- Subjective


Pt reports feeling: Improved (Reports pain is well-controlled on PCA.  Denies 

any gas or flatus.  No ambulation yet.)





Objective





- Vital Signs/Intake & Output


Vital Signs: 


                                Vital Signs x48h











  Temp Pulse Resp BP Pulse Ox


 


 02/16/19 08:00  37.2 C  89  16  107/70  100


 


 02/16/19 07:00   84  10 L  92/57 L 


 


 02/16/19 06:05   87  18  114/65  96


 


 02/16/19 06:00   92  13  116/69 


 


 02/16/19 05:55   91  18  114/74 


 


 02/16/19 05:50   89  17  114/76 


 


 02/16/19 05:48    15  


 


 02/16/19 05:45   93  20  120/71  95


 


 02/16/19 05:40   83  11 L  108/61  96


 


 02/16/19 05:35   83  12  101/67 


 


 02/16/19 05:30   81  11 L  99/65  96


 


 02/16/19 05:25   82  11 L  107/66 


 


 02/16/19 05:20   82  12  102/66 


 


 02/16/19 05:15   83  11 L  102/64 


 


 02/16/19 05:10   83  10 L  108/63 


 


 02/16/19 05:05   80  11 L  107/62  95


 


 02/16/19 05:00   82  10 L  116/64 


 


 02/16/19 04:55   82  11 L  106/63 


 


 02/16/19 04:50   81  10 L  104/64 


 


 02/16/19 04:45   81  10 L  108/69 


 


 02/16/19 04:40   81  10 L  101/66  98


 


 02/16/19 04:35   81  10 L  103/68 


 


 02/16/19 04:30   83  10 L  108/67 


 


 02/16/19 04:25   83  11 L  114/69  100


 


 02/16/19 04:20   82  10 L  104/66  100


 


 02/16/19 04:15   82  10 L  107/64  100


 


 02/16/19 04:10   82   114/63 


 


 02/16/19 04:05   82   115/69 


 


 02/16/19 04:00  37 C  82  10 L  120/66  100


 


 02/16/19 03:55   82   118/67 


 


 02/16/19 03:50   83   126/72 


 


 02/16/19 03:29    12  


 


 02/16/19 03:00   89  21  127/74  100


 


 02/16/19 02:00   83  12  125/67  98


 


 02/16/19 01:20    22  


 


 02/16/19 01:00   83  12  116/70  100











Intake & Output: 


                                 Intake & Output











 02/13/19 02/14/19 02/15/19 02/16/19





 23:59 23:59 23:59 23:59


 


Intake Total   4580 1304.679


 


Output Total   578 749


 


Balance   4002 555.679














- Objective


General Appearance: positive: No acute distress


Eyes Bilateral: positive: Normal inspection


ENT: positive: ENT inspection nml


Neck: positive: Nml inspection


Respiratory: positive: Chest non-tender


Abdomen: positive: Tenderness (normal post-op)


Back: positive: Nml inspection


Skin: positive: Color nml


Extremities: positive: Non-tender


Neurologic/Psychiatric: positive: Oriented x3





- Lab Results


Fish Bones: 


                                 02/16/19 03:30





                                 02/16/19 03:30


Other Labs: 


                               Lab Results x24hrs











  02/16/19 02/16/19 02/16/19 Range/Units





  06:01 03:30 03:30 


 


WBC     (4.8-10.8)  x10^3/uL


 


RBC     (4.70-6.10)  10^6/uL


 


Hgb     (14.0-18.0)  g/dL


 


Hct     (42.0-52.0)  %


 


MCV     (80.0-94.0)  fL


 


MCH     (27.0-31.0)  pg


 


MCHC     (32.0-36.0)  g/dL


 


RDW     (12.0-15.0)  %


 


Plt Count     (130-450)  10^3/uL


 


MPV     (7.4-11.4)  fL


 


Neut # (Auto)     (1.5-6.6)  10^3/uL


 


Lymph # (Auto)     (1.5-3.5)  10^3/uL


 


Mono # (Auto)     (0.0-1.0)  10^3/uL


 


Eos # (Auto)     (0.0-0.7)  10^3/uL


 


Baso # (Auto)     (0.0-0.1)  10^3/uL


 


Absolute Nucleated RBC     x10^3/uL


 


Nucleated RBC %     /100WBC


 


VBG pH   7.360   (7.31-7.41)  


 


Ionized Calcium   1.03 L   (1.15-1.33)  mmol/L


 


Sodium    135  (135-145)  mmol/L


 


Potassium    4.1  (3.5-5.0)  mmol/L


 


Chloride    106  (101-111)  mmol/L


 


Carbon Dioxide    23  (21-32)  mmol/L


 


Anion Gap    6.0  (6-13)  


 


BUN    12  (6-20)  mg/dL


 


Creatinine    0.7  (0.6-1.2)  mg/dL


 


Estimated GFR (MDRD)    108  (>89)  


 


Glucose    126 H  ()  mg/dL


 


Lactic Acid  1.2    (0.5-2.2)  mmol/L


 


Calcium    7.2 L  (8.5-10.3)  mg/dL


 


Phosphorus    2.5  (2.5-4.6)  mg/dL


 


Magnesium    1.6 L  (1.7-2.8)  mg/dL


 


Total Bilirubin    0.5  (0.2-1.0)  mg/dL


 


AST    18  (10-42)  IU/L


 


ALT    15  (10-60)  IU/L


 


Alkaline Phosphatase    46  ()  IU/L


 


Troponin I     (<0.49)  ng/mL


 


Total Protein    4.7 L  (6.7-8.2)  g/dL


 


Albumin    1.9 L  (3.2-5.5)  g/dL


 


Globulin    2.8  (2.1-4.2)  g/dL


 


Albumin/Globulin Ratio    0.7 L  (1.0-2.2)  














  02/16/19 02/15/19 02/15/19 Range/Units





  03:30 14:22 14:22 


 


WBC  15.7 H    (4.8-10.8)  x10^3/uL


 


RBC  3.42 L    (4.70-6.10)  10^6/uL


 


Hgb  10.9 L    (14.0-18.0)  g/dL


 


Hct  32.2 L    (42.0-52.0)  %


 


MCV  94.0    (80.0-94.0)  fL


 


MCH  31.8 H    (27.0-31.0)  pg


 


MCHC  33.8    (32.0-36.0)  g/dL


 


RDW  14.2    (12.0-15.0)  %


 


Plt Count  247    (130-450)  10^3/uL


 


MPV  7.7    (7.4-11.4)  fL


 


Neut # (Auto)  14.9 H    (1.5-6.6)  10^3/uL


 


Lymph # (Auto)  0.4 L    (1.5-3.5)  10^3/uL


 


Mono # (Auto)  0.3    (0.0-1.0)  10^3/uL


 


Eos # (Auto)  0.0    (0.0-0.7)  10^3/uL


 


Baso # (Auto)  0.0    (0.0-0.1)  10^3/uL


 


Absolute Nucleated RBC  0.00    x10^3/uL


 


Nucleated RBC %  0.0    /100WBC


 


VBG pH     (7.31-7.41)  


 


Ionized Calcium     (1.15-1.33)  mmol/L


 


Sodium    135  (135-145)  mmol/L


 


Potassium    3.6  (3.5-5.0)  mmol/L


 


Chloride    103  (101-111)  mmol/L


 


Carbon Dioxide    22  (21-32)  mmol/L


 


Anion Gap    10.0  (6-13)  


 


BUN    13  (6-20)  mg/dL


 


Creatinine    0.7  (0.6-1.2)  mg/dL


 


Estimated GFR (MDRD)    108  (>89)  


 


Glucose    141 H  ()  mg/dL


 


Lactic Acid     (0.5-2.2)  mmol/L


 


Calcium    7.4 L  (8.5-10.3)  mg/dL


 


Phosphorus     (2.5-4.6)  mg/dL


 


Magnesium    1.7  (1.7-2.8)  mg/dL


 


Total Bilirubin    0.8  (0.2-1.0)  mg/dL


 


AST    21  (10-42)  IU/L


 


ALT    16  (10-60)  IU/L


 


Alkaline Phosphatase    41 L  ()  IU/L


 


Troponin I   < 0.04   (<0.49)  ng/mL


 


Total Protein    4.5 L  (6.7-8.2)  g/dL


 


Albumin    2.0 L  (3.2-5.5)  g/dL


 


Globulin    2.5  (2.1-4.2)  g/dL


 


Albumin/Globulin Ratio    0.8 L  (1.0-2.2)  














  02/15/19 02/15/19 02/15/19 Range/Units





  10:15 10:15 10:15 


 


WBC     (4.8-10.8)  x10^3/uL


 


RBC     (4.70-6.10)  10^6/uL


 


Hgb     (14.0-18.0)  g/dL


 


Hct     (42.0-52.0)  %


 


MCV     (80.0-94.0)  fL


 


MCH     (27.0-31.0)  pg


 


MCHC     (32.0-36.0)  g/dL


 


RDW     (12.0-15.0)  %


 


Plt Count     (130-450)  10^3/uL


 


MPV     (7.4-11.4)  fL


 


Neut # (Auto)     (1.5-6.6)  10^3/uL


 


Lymph # (Auto)     (1.5-3.5)  10^3/uL


 


Mono # (Auto)     (0.0-1.0)  10^3/uL


 


Eos # (Auto)     (0.0-0.7)  10^3/uL


 


Baso # (Auto)     (0.0-0.1)  10^3/uL


 


Absolute Nucleated RBC     x10^3/uL


 


Nucleated RBC %     /100WBC


 


VBG pH     (7.31-7.41)  


 


Ionized Calcium     (1.15-1.33)  mmol/L


 


Sodium    133 L  (135-145)  mmol/L


 


Potassium    3.4 L  (3.5-5.0)  mmol/L


 


Chloride    101  (101-111)  mmol/L


 


Carbon Dioxide    21  (21-32)  mmol/L


 


Anion Gap    11.0  (6-13)  


 


BUN    13  (6-20)  mg/dL


 


Creatinine    0.8  (0.6-1.2)  mg/dL


 


Estimated GFR (MDRD)    93  (>89)  


 


Glucose    146 H  ()  mg/dL


 


Lactic Acid   2.1   (0.5-2.2)  mmol/L


 


Calcium    7.2 L  (8.5-10.3)  mg/dL


 


Phosphorus     (2.5-4.6)  mg/dL


 


Magnesium     (1.7-2.8)  mg/dL


 


Total Bilirubin     (0.2-1.0)  mg/dL


 


AST     (10-42)  IU/L


 


ALT     (10-60)  IU/L


 


Alkaline Phosphatase     ()  IU/L


 


Troponin I  < 0.04    (<0.49)  ng/mL


 


Total Protein     (6.7-8.2)  g/dL


 


Albumin     (3.2-5.5)  g/dL


 


Globulin     (2.1-4.2)  g/dL


 


Albumin/Globulin Ratio     (1.0-2.2)  














  02/15/19 Range/Units





  10:15 


 


WBC  3.5 L  (4.8-10.8)  x10^3/uL


 


RBC  3.78 L  (4.70-6.10)  10^6/uL


 


Hgb  12.0 L  (14.0-18.0)  g/dL


 


Hct  34.6 L  (42.0-52.0)  %


 


MCV  91.6  (80.0-94.0)  fL


 


MCH  31.8 H  (27.0-31.0)  pg


 


MCHC  34.8  (32.0-36.0)  g/dL


 


RDW  14.0  (12.0-15.0)  %


 


Plt Count  207  (130-450)  10^3/uL


 


MPV  7.2 L  (7.4-11.4)  fL


 


Neut # (Auto)   (1.5-6.6)  10^3/uL


 


Lymph # (Auto)   (1.5-3.5)  10^3/uL


 


Mono # (Auto)   (0.0-1.0)  10^3/uL


 


Eos # (Auto)   (0.0-0.7)  10^3/uL


 


Baso # (Auto)   (0.0-0.1)  10^3/uL


 


Absolute Nucleated RBC   x10^3/uL


 


Nucleated RBC %   /100WBC


 


VBG pH   (7.31-7.41)  


 


Ionized Calcium   (1.15-1.33)  mmol/L


 


Sodium   (135-145)  mmol/L


 


Potassium   (3.5-5.0)  mmol/L


 


Chloride   (101-111)  mmol/L


 


Carbon Dioxide   (21-32)  mmol/L


 


Anion Gap   (6-13)  


 


BUN   (6-20)  mg/dL


 


Creatinine   (0.6-1.2)  mg/dL


 


Estimated GFR (MDRD)   (>89)  


 


Glucose   ()  mg/dL


 


Lactic Acid   (0.5-2.2)  mmol/L


 


Calcium   (8.5-10.3)  mg/dL


 


Phosphorus   (2.5-4.6)  mg/dL


 


Magnesium   (1.7-2.8)  mg/dL


 


Total Bilirubin   (0.2-1.0)  mg/dL


 


AST   (10-42)  IU/L


 


ALT   (10-60)  IU/L


 


Alkaline Phosphatase   ()  IU/L


 


Troponin I   (<0.49)  ng/mL


 


Total Protein   (6.7-8.2)  g/dL


 


Albumin   (3.2-5.5)  g/dL


 


Globulin   (2.1-4.2)  g/dL


 


Albumin/Globulin Ratio   (1.0-2.2)  














Assessment/Plan





- Problem List


(1) Perforated bowel


Impression: 


Pt recovering well from surgery.  NGT output 600 ml - plan to leave in today.  

[place] output WNL, will make sure pt can sit up/get out of bed before removal, 

which he cannot do yet.  Continue expectant management.

## 2019-02-17 LAB
ALBUMIN DIAFP-MCNC: 1.7 G/DL (ref 3.2–5.5)
ALBUMIN/GLOB SERPL: 0.7 {RATIO} (ref 1–2.2)
ALP SERPL-CCNC: 59 IU/L (ref 42–121)
ALT SERPL W P-5'-P-CCNC: 11 IU/L (ref 10–60)
ANION GAP SERPL CALCULATED.4IONS-SCNC: 4 MMOL/L (ref 6–13)
AST SERPL W P-5'-P-CCNC: 12 IU/L (ref 10–42)
BILIRUB BLD-MCNC: 0.5 MG/DL (ref 0.2–1)
BUN SERPL-MCNC: 10 MG/DL (ref 6–20)
CALCIUM UR-MCNC: 7.2 MG/DL (ref 8.5–10.3)
CHLORIDE SERPL-SCNC: 107 MMOL/L (ref 101–111)
CO2 SERPL-SCNC: 22 MMOL/L (ref 21–32)
CREAT SERPLBLD-SCNC: 0.6 MG/DL (ref 0.6–1.2)
GFRSERPLBLD MDRD-ARVRAT: 129 ML/MIN/{1.73_M2} (ref 89–?)
GLOBULIN SER-MCNC: 2.6 G/DL (ref 2.1–4.2)
GLUCOSE SERPL-MCNC: 109 MG/DL (ref 70–100)
MAGNESIUM SERPL-MCNC: 2.2 MG/DL (ref 1.7–2.8)
PH BLDV: 7.37 [PH] (ref 7.31–7.41)
PHOSPHATE BLD-MCNC: 1.9 MG/DL (ref 2.5–4.6)
PROT SPEC-MCNC: 4.3 G/DL (ref 6.7–8.2)
SODIUM SERPLBLD-SCNC: 133 MMOL/L (ref 135–145)

## 2019-02-17 RX ADMIN — SODIUM CHLORIDE SCH MLS/HR: 9 INJECTION, SOLUTION INTRAVENOUS at 10:00

## 2019-02-17 RX ADMIN — FAMOTIDINE SCH MLS/HR: 20 INJECTION, SOLUTION INTRAVENOUS at 20:57

## 2019-02-17 RX ADMIN — SODIUM CHLORIDE, PRESERVATIVE FREE SCH ML: 5 INJECTION INTRAVENOUS at 17:31

## 2019-02-17 RX ADMIN — SODIUM CHLORIDE SCH MLS/HR: 9 INJECTION, SOLUTION INTRAVENOUS at 02:43

## 2019-02-17 RX ADMIN — POTASSIUM CHLORIDE, DEXTROSE MONOHYDRATE AND SODIUM CHLORIDE SCH MLS/HR: 150; 5; 450 INJECTION, SOLUTION INTRAVENOUS at 02:43

## 2019-02-17 RX ADMIN — SODIUM CHLORIDE SCH MLS/HR: 9 INJECTION, SOLUTION INTRAVENOUS at 15:30

## 2019-02-17 RX ADMIN — SODIUM CHLORIDE, PRESERVATIVE FREE SCH ML: 5 INJECTION INTRAVENOUS at 09:58

## 2019-02-17 RX ADMIN — POTASSIUM CHLORIDE, DEXTROSE MONOHYDRATE AND SODIUM CHLORIDE SCH MLS/HR: 150; 5; 900 INJECTION, SOLUTION INTRAVENOUS at 09:50

## 2019-02-17 RX ADMIN — SODIUM CHLORIDE SCH MLS/HR: 9 INJECTION, SOLUTION INTRAVENOUS at 20:58

## 2019-02-17 RX ADMIN — POTASSIUM CHLORIDE, DEXTROSE MONOHYDRATE AND SODIUM CHLORIDE SCH MLS/HR: 150; 5; 900 INJECTION, SOLUTION INTRAVENOUS at 20:58

## 2019-02-17 RX ADMIN — SODIUM CHLORIDE, PRESERVATIVE FREE SCH ML: 5 INJECTION INTRAVENOUS at 02:43

## 2019-02-17 RX ADMIN — MORPHINE SULFATE PRN MG: 2 INJECTION, SOLUTION INTRAMUSCULAR; INTRAVENOUS at 12:22

## 2019-02-17 RX ADMIN — FAMOTIDINE SCH MLS/HR: 20 INJECTION, SOLUTION INTRAVENOUS at 09:10

## 2019-02-17 RX ADMIN — ENOXAPARIN SODIUM SCH MG: 100 INJECTION SUBCUTANEOUS at 09:16

## 2019-02-17 RX ADMIN — SODIUM CHLORIDE, PRESERVATIVE FREE PRN ML: 5 INJECTION INTRAVENOUS at 05:09

## 2019-02-17 RX ADMIN — Medication PRN UNIT: at 05:09

## 2019-02-17 RX ADMIN — HYDROCODONE BITARTRATE AND ACETAMINOPHEN PRN TAB: 5; 325 TABLET ORAL at 19:46

## 2019-02-17 NOTE — PROVIDER PROGRESS NOTE
Subjective





- Prog Note Date


Prog Note Date: 02/17/19


Prog Note Time: 09:01





- Subjective


Pt reports feeling: Improved (No complaints.  Pain well-controlled and not using

PCA much now.  No flatus/BMs.  OOB to chair yesterday.  No N/V.)





Objective





- Vital Signs/Intake & Output


Vital Signs: 


                                Vital Signs x48h











  Temp Pulse Resp BP Pulse Ox


 


 02/17/19 08:00  36.4 C L  79  16  136/84 H 


 


 02/17/19 07:07    20  


 


 02/17/19 07:00   83  23  138/77 H  93


 


 02/17/19 06:00   69  11 L  124/63 


 


 02/17/19 05:00   69  10 L  120/63 


 


 02/17/19 04:00  37 C  68  10 L  115/65 


 


 02/17/19 03:00   72  12  135/71 H  94


 


 02/17/19 02:00   84  21  122/76  97











Intake & Output: 


                                 Intake & Output











 02/14/19 02/15/19 02/16/19 02/17/19





 23:59 23:59 23:59 23:59


 


Intake Total  4580 3509.646 908.000


 


Output Total  578 2234 755


 


Balance  4002 1275.646 153.000














- Objective


General Appearance: positive: No acute distress


Eyes Bilateral: positive: Normal inspection


ENT: positive: ENT inspection nml


Neck: positive: Nml inspection


Respiratory: positive: Chest non-tender


Abdomen: positive: Non-tender


Back: positive: Nml inspection


Skin: positive: Color nml


Extremities: positive: Non-tender


Neurologic/Psychiatric: positive: Oriented x3





- Lab Results


Fish Bones: 


                                 02/16/19 12:00





                                 02/17/19 05:10


Other Labs: 


                               Lab Results x24hrs











  02/17/19 02/17/19 02/17/19 Range/Units





  05:10 05:10 05:10 


 


WBC     (4.8-10.8)  x10^3/uL


 


RBC     (4.70-6.10)  10^6/uL


 


Hgb     (14.0-18.0)  g/dL


 


Hct     (42.0-52.0)  %


 


MCV     (80.0-94.0)  fL


 


MCH     (27.0-31.0)  pg


 


MCHC     (32.0-36.0)  g/dL


 


RDW     (12.0-15.0)  %


 


Plt Count     (130-450)  10^3/uL


 


MPV     (7.4-11.4)  fL


 


Neut # (Auto)     (1.5-6.6)  10^3/uL


 


Lymph # (Auto)     (1.5-3.5)  10^3/uL


 


Mono # (Auto)     (0.0-1.0)  10^3/uL


 


Eos # (Auto)     (0.0-0.7)  10^3/uL


 


Baso # (Auto)     (0.0-0.1)  10^3/uL


 


Absolute Nucleated RBC     x10^3/uL


 


Nucleated RBC %     /100WBC


 


VBG pH   7.374   (7.31-7.41)  


 


Ionized Calcium   1.09 L   (1.15-1.33)  mmol/L


 


Sodium    133 L  (135-145)  mmol/L


 


Potassium    3.7  (3.5-5.0)  mmol/L


 


Chloride    107  (101-111)  mmol/L


 


Carbon Dioxide    22  (21-32)  mmol/L


 


Anion Gap    4.0 L  (6-13)  


 


BUN    10  (6-20)  mg/dL


 


Creatinine    0.6  (0.6-1.2)  mg/dL


 


Estimated GFR (MDRD)    129  (>89)  


 


Glucose    109 H  ()  mg/dL


 


Calcium    7.2 L  (8.5-10.3)  mg/dL


 


Phosphorus  1.9 L    (2.5-4.6)  mg/dL


 


Magnesium  2.2    (1.7-2.8)  mg/dL


 


Total Bilirubin    0.5  (0.2-1.0)  mg/dL


 


AST    12  (10-42)  IU/L


 


ALT    11  (10-60)  IU/L


 


Alkaline Phosphatase    59  ()  IU/L


 


Total Protein    4.3 L  (6.7-8.2)  g/dL


 


Albumin    1.7 L  (3.2-5.5)  g/dL


 


Globulin    2.6  (2.1-4.2)  g/dL


 


Albumin/Globulin Ratio    0.7 L  (1.0-2.2)  














  02/16/19 Range/Units





  12:00 


 


WBC  13.9 H  (4.8-10.8)  x10^3/uL


 


RBC  3.34 L  (4.70-6.10)  10^6/uL


 


Hgb  10.6 L  (14.0-18.0)  g/dL


 


Hct  31.0 L  (42.0-52.0)  %


 


MCV  93.0  (80.0-94.0)  fL


 


MCH  31.7 H  (27.0-31.0)  pg


 


MCHC  34.1  (32.0-36.0)  g/dL


 


RDW  14.1  (12.0-15.0)  %


 


Plt Count  222  (130-450)  10^3/uL


 


MPV  7.3 L  (7.4-11.4)  fL


 


Neut # (Auto)  13.2 H  (1.5-6.6)  10^3/uL


 


Lymph # (Auto)  0.4 L  (1.5-3.5)  10^3/uL


 


Mono # (Auto)  0.3  (0.0-1.0)  10^3/uL


 


Eos # (Auto)  0.0  (0.0-0.7)  10^3/uL


 


Baso # (Auto)  0.0  (0.0-0.1)  10^3/uL


 


Absolute Nucleated RBC  0.00  x10^3/uL


 


Nucleated RBC %  0.0  /100WBC


 


VBG pH   (7.31-7.41)  


 


Ionized Calcium   (1.15-1.33)  mmol/L


 


Sodium   (135-145)  mmol/L


 


Potassium   (3.5-5.0)  mmol/L


 


Chloride   (101-111)  mmol/L


 


Carbon Dioxide   (21-32)  mmol/L


 


Anion Gap   (6-13)  


 


BUN   (6-20)  mg/dL


 


Creatinine   (0.6-1.2)  mg/dL


 


Estimated GFR (MDRD)   (>89)  


 


Glucose   ()  mg/dL


 


Calcium   (8.5-10.3)  mg/dL


 


Phosphorus   (2.5-4.6)  mg/dL


 


Magnesium   (1.7-2.8)  mg/dL


 


Total Bilirubin   (0.2-1.0)  mg/dL


 


AST   (10-42)  IU/L


 


ALT   (10-60)  IU/L


 


Alkaline Phosphatase   ()  IU/L


 


Total Protein   (6.7-8.2)  g/dL


 


Albumin   (3.2-5.5)  g/dL


 


Globulin   (2.1-4.2)  g/dL


 


Albumin/Globulin Ratio   (1.0-2.2)  














Assessment/Plan





- Problem List


(1) Perforated bowel


Impression: 


Pt continues to recover from surgery POD 2.  Plan to remove NGT and ross today 

and stop PCA.  Not ready for PO intake yet.  Incision healing well with no sign 

of infection.

## 2019-02-17 NOTE — PROVIDER PROGRESS NOTE
Assessment/Plan





- Problem List


(1) Perforated bowel


Assessment/Plan: 


Ng tube ordered to be removed ans well as Alme[place].


Increasing ambulation may help bowel sounds return.


No po diet or meds until ordered by Surgeon.


Antibiotics per surgery as well.


Tumor pathology is still pending.








(2) Hyponatremia


Assessment/Plan: 


Will change iv fluids from D5 1/2 NS w/ K to D5 NS w/ K, since he is not on a 

diet yet to adjust diet


Follow BMP daily.








(3) Lymphoma


Assessment/Plan: 


Hx of lymphoma in record.


The pathology of the present tumor is not yet known.








(4) Hypokalemia


Assessment/Plan: 


Resolved, on K replacement iv








(5) Hypotension


Assessment/Plan: 


Resolved with hydration.


He will be transferred out of ICU to [facility] today.








- Current Meds


Current Meds: 





                               Current Medications











Generic Name Dose Route Start Last Admin





  Trade Name Freq  PRN Reason Stop Dose Admin


 


Enoxaparin Sodium  40 mg  02/15/19 09:00  02/16/19 09:04





  Lovenox  SUBQ   40 mg





  DAILY LILLIANA   Administration





     





     





     





     


 


Heparin Sodium (Beef Lung)  30 - 50 unit  02/16/19 02:33  02/17/19 05:09





    IVP   60 unit





  PRN PRN   Administration





  Central Line Protocol (<24 hr)   





     





     





     


 


Famotidine  50 mls @ 100 mls/hr  02/15/19 09:00  02/16/19 21:15





  Pepcid 20 Mg/50 Ml  IV   Infused





  BID LILLIANA   Infusion





     





     





     





     


 


Piperacillin Sod/Tazobactam  100 mls @ 200 mls/hr  02/15/19 09:00  02/17/19 

03:30





  Sod 3.375 gm/ Sodium Chloride  IV   Infused





  Q6H LILLIANA   Infusion





     





     





     





     


 


Morphine Sulfate/Sodium Chloride  0 mg  02/15/19 08:14  02/15/19 13:48





  Morphine Pca (Use Pca Order Set)  IV   50 mg





  PCA PRN   Administration





  PAIN   





     





  Protocol   





     


 


Sodium Chloride  10 ml  02/15/19 09:00  02/17/19 02:43





  Normal Saline Flush 0.9%  IVP   10 ml





  0100,0900,1700 LILLIANA   Administration





     





     





     





     


 


Sodium Chloride  10 ml  02/15/19 08:02  02/16/19 04:39





  Normal Saline Flush 0.9%  IVP   10 ml





  PRN PRN   Administration





  AS NEEDED PER PROVIDER ORDERS   





     





     





     


 


Sodium Chloride  20 ml  02/16/19 02:33  02/17/19 05:09





  Normal Saline Flush 0.9%  IVP   20 ml





  PRN PRN   Administration





  After Blood Draw   





     





     





     














- Lab Result


Fish Bone Diagrams: 


                                 02/16/19 12:00





                                 02/17/19 05:10





- Additional Planning


My Orders: 





My Active Orders





02/17/19 07:00


Potassium Phosphate 15 mmol   Sodium Chloride 0.9% [Normal Saline 0.9%] 250 ml 

IV ONCE 





02/17/19 08:28


Sodium Phosphate 20 mmol   Sodium Chloride 0.9% [Normal Saline 0.9%] 250 ml IV 

ONCE 





02/17/19 09:00


D5.9NS W/20 MEQ KCL @ 83.333 mls/hr D5ns W/20 Meq KCl 1,000 ml IV 83.333 mls/hr 





02/18/19 05:00


CALCIUM, IONIZED ([facility]) [BG] DAILYLAB 


CMP [COMPREHENSIVE METABOLIC PANEL] [CHEM] DAILYLAB 


MAGNESIUM [CHEM] DAILYLAB 


PHOSPHORUS [CHEM] DAILYLAB 





02/19/19 05:00


CMP [COMPREHENSIVE METABOLIC PANEL] [CHEM] DAILYLAB 


MAGNESIUM [CHEM] DAILYLAB 


PHOSPHORUS [CHEM] DAILYLAB 














Objective


Vital Signs: 





                               Vital Signs - 24 hr











  02/16/19 02/16/19 02/16/19





  09:00 10:00 10:04


 


Temperature   37.2 C


 


Heart Rate   89


 


Heart Rate [   





Activity]   


 


Heart Rate [ 93 82 





Monitoring   





electrodes]   


 


Respiratory 19 19 20





Rate   


 


Blood Pressure 100/61 96/61 





[Left Brachial   





artery]   


 


O2 Saturation  100 97


 


O2 Saturation [   





With Activity]   














  02/16/19 02/16/19 02/16/19





  11:00 12:00 12:14


 


Temperature   


 


Heart Rate   


 


Heart Rate [   87





Activity]   


 


Heart Rate [ 92 96 





Monitoring   





electrodes]   


 


Respiratory 18 20 





Rate   


 


Blood Pressure 97/56 L 101/68 





[Left Brachial   





artery]   


 


O2 Saturation 98  


 


O2 Saturation [   92





With Activity]   














  02/16/19 02/16/19 02/16/19





  14:00 15:00 16:00


 


Temperature   36.7 C


 


Heart Rate   


 


Heart Rate [   





Activity]   


 


Heart Rate [ 84 85 86





Monitoring   





electrodes]   


 


Respiratory 12 15 15





Rate   


 


Blood Pressure 114/70 111/66 124/71





[Left Brachial   





artery]   


 


O2 Saturation 93 94 


 


O2 Saturation [   





With Activity]   














  02/16/19 02/16/19 02/16/19





  17:00 18:00 18:59


 


Temperature   


 


Heart Rate   


 


Heart Rate [   





Activity]   


 


Heart Rate [ 91 89 81





Monitoring   





electrodes]   


 


Respiratory 22 19 15





Rate   


 


Blood Pressure 108/72 116/72 111/74





[Left Brachial   





artery]   


 


O2 Saturation 94  


 


O2 Saturation [   





With Activity]   














  02/16/19 02/16/19 02/16/19





  19:15 20:00 21:00


 


Temperature   


 


Heart Rate   


 


Heart Rate [   





Activity]   


 


Heart Rate [  74 74





Monitoring   





electrodes]   


 


Respiratory  9 L 10 L





Rate   


 


Blood Pressure  121/68 115/69





[Left Brachial   





artery]   


 


O2 Saturation 100 97 96


 


O2 Saturation [   





With Activity]   














  02/16/19 02/16/19 02/16/19





  22:00 23:00 23:03


 


Temperature   


 


Heart Rate   


 


Heart Rate [   





Activity]   


 


Heart Rate [ 75 70 





Monitoring   





electrodes]   


 


Respiratory 15 10 L 10 L





Rate   


 


Blood Pressure 139/67 H 113/79 





[Left Brachial   





artery]   


 


O2 Saturation 96 95 


 


O2 Saturation [   





With Activity]   














  02/17/19 02/17/19 02/17/19





  00:00 01:00 02:00


 


Temperature 36.8 C  


 


Heart Rate   


 


Heart Rate [   





Activity]   


 


Heart Rate [ 73 81 84





Monitoring   





electrodes]   


 


Respiratory 14 15 21





Rate   


 


Blood Pressure 125/74 128/66 122/76





[Left Brachial   





artery]   


 


O2 Saturation 97 95 97


 


O2 Saturation [   





With Activity]   














  02/17/19 02/17/19 02/17/19





  03:00 04:00 05:00


 


Temperature  37 C 


 


Heart Rate   


 


Heart Rate [   





Activity]   


 


Heart Rate [ 72 68 69





Monitoring   





electrodes]   


 


Respiratory 12 10 L 10 L





Rate   


 


Blood Pressure 135/71 H 115/65 120/63





[Left Brachial   





artery]   


 


O2 Saturation 94  


 


O2 Saturation [   





With Activity]   














  02/17/19 02/17/19 02/17/19





  06:00 07:00 07:07


 


Temperature   


 


Heart Rate   


 


Heart Rate [   





Activity]   


 


Heart Rate [ 69 83 





Monitoring   





electrodes]   


 


Respiratory 11 L 23 20





Rate   


 


Blood Pressure 124/63 138/77 H 





[Left Brachial   





artery]   


 


O2 Saturation  93 


 


O2 Saturation [   





With Activity]   














  02/17/19





  08:00


 


Temperature 36.4 C L


 


Heart Rate 


 


Heart Rate [ 





Activity] 


 


Heart Rate [ 79





Monitoring 





electrodes] 


 


Respiratory 16





Rate 


 


Blood Pressure 136/84 H





[Left Brachial 





artery] 


 


O2 Saturation 


 


O2 Saturation [ 





With Activity] 








                                     Oxygen











O2 Source [With Activity]      Nasal cannula


 


O2 Source                      Room air














I&O (Last 24 Hrs): 





                          Intake and Output Totals x24h











 02/15/19 02/16/19 02/17/19





 23:59 23:59 23:59


 


Intake Total 4580 3509.646 908.000


 


Output Total 578 2234 755


 


Balance 4002 1275.646 153.000











General: Alert, Oriented x3


HEENT: Mucous membr. moist/pink


Cardiovascular: Regular rate


Respiratory: No respiratory distress


Abdomen: Soft, Other (No bowel sounds)


Extremities: No edema





- Results


Results: 





                               Laboratory Results











WBC  13.9 x10^3/uL (4.8-10.8)  H  02/16/19  12:00    


 


RBC  3.34 10^6/uL (4.70-6.10)  L  02/16/19  12:00    


 


Hgb  10.6 g/dL (14.0-18.0)  L  02/16/19  12:00    


 


Hct  31.0 % (42.0-52.0)  L  02/16/19  12:00    


 


MCV  93.0 fL (80.0-94.0)   02/16/19  12:00    


 


MCH  31.7 pg (27.0-31.0)  H  02/16/19  12:00    


 


MCHC  34.1 g/dL (32.0-36.0)   02/16/19  12:00    


 


RDW  14.1 % (12.0-15.0)   02/16/19  12:00    


 


Plt Count  222 10^3/uL (130-450)   02/16/19  12:00    


 


MPV  7.3 fL (7.4-11.4)  L  02/16/19  12:00    


 


Neut # (Auto)  13.2 10^3/uL (1.5-6.6)  H  02/16/19  12:00    


 


Lymph # (Auto)  0.4 10^3/uL (1.5-3.5)  L  02/16/19  12:00    


 


Mono # (Auto)  0.3 10^3/uL (0.0-1.0)   02/16/19  12:00    


 


Eos # (Auto)  0.0 10^3/uL (0.0-0.7)   02/16/19  12:00    


 


Baso # (Auto)  0.0 10^3/uL (0.0-0.1)   02/16/19  12:00    


 


Absolute Nucleated RBC  0.00 x10^3/uL  02/16/19  12:00    


 


Total Counted  100   02/15/19  00:20    


 


Band Neuts % (Manual)  5 % (0-10)  02/15/19  00:20    


 


Abnorm Lymph % (Manual)  0 %  02/15/19  00:20    


 


Nucleated RBC %  0.0 /100WBC  02/16/19  12:00    


 


Neutrophils # (Manual)  1.4 10^3/uL (1.5-6.6)  L  02/15/19  00:20    


 


Lymphocytes # (Manual)  0.2 10^3/uL (1.5-3.5)  L  02/15/19  00:20    


 


Monocytes # (Manual)  0.0 10^3/uL (0.0-1.0)   02/15/19  00:20    


 


Eosinophils # (Manual)  0.0 10^3/uL (0-0.7)   02/15/19  00:20    


 


Basophils # (Manual)  0.0 10^3/uL (0-0.1)   02/15/19  00:20    


 


Differential Comment  MANUAL DIFFERENTIAL   02/15/19  00:20    


 


Platelet Estimate  NORMAL (130-450,000)  (NORMAL)   02/15/19  00:20    


 


RBC Morph Micro Appear  NORMAL APPEARANCE  (NORMAL)   02/15/19  00:20    


 


VBG pH  7.374  (7.31-7.41)   02/17/19  05:10    


 


Ionized Calcium  1.09 mmol/L (1.15-1.33)  L  02/17/19  05:10    


 


Sodium  133 mmol/L (135-145)  L  02/17/19  05:10    


 


Potassium  3.7 mmol/L (3.5-5.0)   02/17/19  05:10    


 


Chloride  107 mmol/L (101-111)   02/17/19  05:10    


 


Carbon Dioxide  22 mmol/L (21-32)   02/17/19  05:10    


 


Anion Gap  4.0  (6-13)  L  02/17/19  05:10    


 


BUN  10 mg/dL (6-20)   02/17/19  05:10    


 


Creatinine  0.6 mg/dL (0.6-1.2)   02/17/19  05:10    


 


Estimated GFR (MDRD)  129  (>89)   02/17/19  05:10    


 


Glucose  109 mg/dL ()  H  02/17/19  05:10    


 


Lactic Acid  1.2 mmol/L (0.5-2.2)   02/16/19  06:01    


 


Calcium  7.2 mg/dL (8.5-10.3)  L  02/17/19  05:10    


 


Phosphorus  1.9 mg/dL (2.5-4.6)  L  02/17/19  05:10    


 


Magnesium  2.2 mg/dL (1.7-2.8)   02/17/19  05:10    


 


Total Bilirubin  0.5 mg/dL (0.2-1.0)   02/17/19  05:10    


 


AST  12 IU/L (10-42)   02/17/19  05:10    


 


ALT  11 IU/L (10-60)   02/17/19  05:10    


 


Alkaline Phosphatase  59 IU/L ()   02/17/19  05:10    


 


Troponin I  < 0.04 ng/mL (<0.49)   02/15/19  14:22    


 


Total Protein  4.3 g/dL (6.7-8.2)  L  02/17/19  05:10    


 


Albumin  1.7 g/dL (3.2-5.5)  L  02/17/19  05:10    


 


Globulin  2.6 g/dL (2.1-4.2)   02/17/19  05:10    


 


Albumin/Globulin Ratio  0.7  (1.0-2.2)  L  02/17/19  05:10    


 


Lipase  22 U/L (22-51)   02/15/19  00:20    














- Procedures


Procedures: 





Procedures





INCIS W REM OF FORIEGN BODY OR DEV FROM SKIN & SUBCUT TISSUE (05/30/14)

## 2019-02-18 LAB
ALBUMIN DIAFP-MCNC: 1.7 G/DL (ref 3.2–5.5)
ALBUMIN/GLOB SERPL: 0.6 {RATIO} (ref 1–2.2)
ALP SERPL-CCNC: 58 IU/L (ref 42–121)
ALT SERPL W P-5'-P-CCNC: 13 IU/L (ref 10–60)
ANION GAP SERPL CALCULATED.4IONS-SCNC: 5 MMOL/L (ref 6–13)
AST SERPL W P-5'-P-CCNC: 14 IU/L (ref 10–42)
BILIRUB BLD-MCNC: 0.7 MG/DL (ref 0.2–1)
BUN SERPL-MCNC: 7 MG/DL (ref 6–20)
CALCIUM UR-MCNC: 7.4 MG/DL (ref 8.5–10.3)
CHLORIDE SERPL-SCNC: 107 MMOL/L (ref 101–111)
CO2 SERPL-SCNC: 22 MMOL/L (ref 21–32)
CREAT SERPLBLD-SCNC: 0.6 MG/DL (ref 0.6–1.2)
GFRSERPLBLD MDRD-ARVRAT: 129 ML/MIN/{1.73_M2} (ref 89–?)
GLOBULIN SER-MCNC: 2.8 G/DL (ref 2.1–4.2)
GLUCOSE SERPL-MCNC: 93 MG/DL (ref 70–100)
MAGNESIUM SERPL-MCNC: 1.9 MG/DL (ref 1.7–2.8)
PH BLDV: 7.42 [PH] (ref 7.31–7.41)
PHOSPHATE BLD-MCNC: 2.5 MG/DL (ref 2.5–4.6)
PROT SPEC-MCNC: 4.5 G/DL (ref 6.7–8.2)
SODIUM SERPLBLD-SCNC: 134 MMOL/L (ref 135–145)

## 2019-02-18 RX ADMIN — SODIUM CHLORIDE SCH MLS/HR: 9 INJECTION, SOLUTION INTRAVENOUS at 03:27

## 2019-02-18 RX ADMIN — POTASSIUM CHLORIDE, DEXTROSE MONOHYDRATE AND SODIUM CHLORIDE SCH MLS/HR: 150; 5; 900 INJECTION, SOLUTION INTRAVENOUS at 08:05

## 2019-02-18 RX ADMIN — ENOXAPARIN SODIUM SCH MG: 100 INJECTION SUBCUTANEOUS at 09:10

## 2019-02-18 RX ADMIN — MORPHINE SULFATE PRN MG: 2 INJECTION, SOLUTION INTRAMUSCULAR; INTRAVENOUS at 19:35

## 2019-02-18 RX ADMIN — SODIUM CHLORIDE SCH MLS/HR: 9 INJECTION, SOLUTION INTRAVENOUS at 09:08

## 2019-02-18 RX ADMIN — POTASSIUM CHLORIDE, DEXTROSE MONOHYDRATE AND SODIUM CHLORIDE SCH MLS/HR: 150; 5; 900 INJECTION, SOLUTION INTRAVENOUS at 19:13

## 2019-02-18 RX ADMIN — SODIUM CHLORIDE, PRESERVATIVE FREE SCH ML: 5 INJECTION INTRAVENOUS at 03:27

## 2019-02-18 RX ADMIN — HYDROCODONE BITARTRATE AND ACETAMINOPHEN PRN TAB: 5; 325 TABLET ORAL at 10:34

## 2019-02-18 RX ADMIN — SODIUM CHLORIDE, PRESERVATIVE FREE SCH ML: 5 INJECTION INTRAVENOUS at 09:11

## 2019-02-18 RX ADMIN — SODIUM CHLORIDE SCH MLS/HR: 9 INJECTION, SOLUTION INTRAVENOUS at 15:39

## 2019-02-18 RX ADMIN — Medication PRN UNIT: at 17:02

## 2019-02-18 RX ADMIN — FAMOTIDINE SCH MLS/HR: 20 INJECTION, SOLUTION INTRAVENOUS at 08:40

## 2019-02-18 RX ADMIN — SODIUM CHLORIDE, PRESERVATIVE FREE SCH ML: 5 INJECTION INTRAVENOUS at 17:00

## 2019-02-18 RX ADMIN — SODIUM CHLORIDE SCH MLS/HR: 9 INJECTION, SOLUTION INTRAVENOUS at 21:11

## 2019-02-18 RX ADMIN — SODIUM CHLORIDE, PRESERVATIVE FREE SCH ML: 5 INJECTION INTRAVENOUS at 19:36

## 2019-02-18 RX ADMIN — FAMOTIDINE SCH MLS/HR: 20 INJECTION, SOLUTION INTRAVENOUS at 21:11

## 2019-02-18 NOTE — PROVIDER PROGRESS NOTE
Subjective





- Prog Note Date


Prog Note Date: 02/18/19


Prog Note Time: 09:15





- Subjective


Pt reports feeling: Improved (Reports feeling better.  Walking.  No pain.  No 

flatus/BM.)





Objective





- Vital Signs/Intake & Output


Vital Signs: 


                                Vital Signs x48h











  Temp Pulse Resp BP Pulse Ox


 


 02/18/19 08:13     153/93 H 


 


 02/18/19 07:50  36.8 C  83  19  160/91 H  93


 


 02/18/19 05:00  36.4 C L  72  15  166/84 H  95











Intake & Output: 


                                 Intake & Output











 02/15/19 02/16/19 02/17/19 02/18/19





 23:59 23:59 23:59 23:59


 


Intake Total 4580 3510.333 3148.112 100


 


Output Total 578 2234 1030 


 


Balance 4002 6442.010 5632.112 100














- Objective


General Appearance: positive: No acute distress


Eyes Bilateral: positive: Normal inspection


ENT: positive: ENT inspection nml


Neck: positive: Nml inspection


Respiratory: positive: Chest non-tender


Abdomen: positive: Non-tender (wound clean and dry)


Back: positive: Nml inspection


Skin: positive: Color nml


Extremities: positive: Non-tender


Neurologic/Psychiatric: positive: Oriented x3





- Lab Results


Fish Bones: 


                                 02/16/19 12:00





                                 02/18/19 05:00


Other Labs: 


                               Lab Results x24hrs











  02/18/19 02/18/19 Range/Units





  05:00 05:00 


 


VBG pH  7.416 H   (7.31-7.41)  


 


Ionized Calcium  1.10 L   (1.15-1.33)  mmol/L


 


Sodium   134 L  (135-145)  mmol/L


 


Potassium   3.5  (3.5-5.0)  mmol/L


 


Chloride   107  (101-111)  mmol/L


 


Carbon Dioxide   22  (21-32)  mmol/L


 


Anion Gap   5.0 L  (6-13)  


 


BUN   7  (6-20)  mg/dL


 


Creatinine   0.6  (0.6-1.2)  mg/dL


 


Estimated GFR (MDRD)   129  (>89)  


 


Glucose   93  ()  mg/dL


 


Calcium   7.4 L  (8.5-10.3)  mg/dL


 


Phosphorus   2.5  (2.5-4.6)  mg/dL


 


Magnesium   1.9  (1.7-2.8)  mg/dL


 


Total Bilirubin   0.7  (0.2-1.0)  mg/dL


 


AST   14  (10-42)  IU/L


 


ALT   13  (10-60)  IU/L


 


Alkaline Phosphatase   58  ()  IU/L


 


Total Protein   4.5 L  (6.7-8.2)  g/dL


 


Albumin   1.7 L  (3.2-5.5)  g/dL


 


Globulin   2.8  (2.1-4.2)  g/dL


 


Albumin/Globulin Ratio   0.6 L  (1.0-2.2)  














Assessment/Plan





- Problem List


(1) Perforated bowel


Impression: 


Pt continues to improve.  Tolerating liquids, but still no BM.  Not unexpected 

with the longstanding SBO.  Will keep on liquids for now as abdomen is a bit 

distended.  No N/V.

## 2019-02-18 NOTE — PROVIDER PROGRESS NOTE
Assessment/Plan





- Problem List


(1) Perforated bowel


Assessment/Plan: 


The surgeon ordered a clear liquid diet. Pt took 25%.


Will decrease peripheral iv rate.


Monitor BMP daily.








(2) HTN (hypertension)


Assessment/Plan: 


He has been hypertensive for > 24 hours.


Will add prn iv Hydralazine to manage BP.








(3) Hyponatremia


Assessment/Plan: 


Slightly better Na.


Continue peripheral iv with saline.


Monitor electrolytes daily.








(4) Hypokalemia


Assessment/Plan: 


Resolved








(5) Hypotension


Assessment/Plan: 


Resolved with iv volume replacement.








- Current Meds


Current Meds: 





                               Current Medications











Generic Name Dose Route Start Last Admin





  Trade Name Freq  PRN Reason Stop Dose Admin


 


Hydrocodone Bitart/Acetaminophen  1 tab  02/17/19 15:15  02/17/19 19:46





  Norco 5/325  PO   1 tab





  Q4HR PRN   Administration





  PAIN   





     





     





     


 


Enoxaparin Sodium  40 mg  02/15/19 09:00  02/17/19 09:16





  Lovenox  SUBQ   40 mg





  DAILY LILLIANA   Administration





     





     





     





     


 


Heparin Sodium (Beef Lung)  30 - 50 unit  02/16/19 02:33  02/17/19 05:09





    IVP   60 unit





  PRN PRN   Administration





  Central Line Protocol (<24 hr)   





     





     





     


 


Famotidine  50 mls @ 100 mls/hr  02/15/19 09:00  02/17/19 21:42





  Pepcid 20 Mg/50 Ml  IV   Infused





  BID LILLIANA   Infusion





     





     





     





     


 


Piperacillin Sod/Tazobactam  100 mls @ 200 mls/hr  02/15/19 09:00  02/18/19 

04:00





  Sod 3.375 gm/ Sodium Chloride  IV   Infused





  Q6H LILLIANA   Infusion





     





     





     





     


 


Morphine Sulfate  2 mg  02/17/19 09:00  02/17/19 12:22





  Morphine (Carpuject)  IVP   2 mg





  Q2HR PRN   Administration





  PAIN   





     





     





     


 


Sodium Chloride  10 ml  02/15/19 09:00  02/18/19 03:27





  Normal Saline Flush 0.9%  IVP   10 ml





  0100,0900,1700 LILLIANA   Administration





     





     





     





     


 


Sodium Chloride  10 ml  02/15/19 08:02  02/16/19 04:39





  Normal Saline Flush 0.9%  IVP   10 ml





  PRN PRN   Administration





  AS NEEDED PER PROVIDER ORDERS   





     





     





     


 


Sodium Chloride  20 ml  02/16/19 02:33  02/17/19 05:09





  Normal Saline Flush 0.9%  IVP   20 ml





  PRN PRN   Administration





  After Blood Draw   





     





     





     














- Lab Result


Fish Bone Diagrams: 


                                 02/16/19 12:00





                                 02/18/19 05:00





- Additional Planning


My Orders: 





My Active Orders





02/18/19 07:56


D5ns W/20 Meq KCl 1,000 ml IV 40 mls/hr 





02/19/19 05:00


CMP [COMPREHENSIVE METABOLIC PANEL] [CHEM] DAILYLAB 


MAGNESIUM [CHEM] DAILYLAB 


PHOSPHORUS [CHEM] DAILYLAB 














Subjective





- Subjective


Patient Reports: Feeling Better, Resting Comfortably





Objective


Vital Signs: 





                               Vital Signs - 24 hr











  02/17/19 02/17/19 02/17/19





  08:00 10:00 12:00


 


Temperature 36.4 C L  


 


Heart Rate [ 79  84





Monitoring   





electrodes]   


 


Respiratory 16 16 18





Rate   


 


Blood Pressure 136/84 H  163/94 H





[Left Brachial   





artery]   


 


O2 Saturation   92














  02/17/19 02/17/19 02/18/19





  17:00 21:00 00:55


 


Temperature 36.6 C 36.9 C 36.5 C


 


Heart Rate [ 76 76 72





Monitoring   





electrodes]   


 


Respiratory 14 93 H 15





Rate   


 


Blood Pressure 160/83 H 137/68 H 153/88 H





[Left Brachial   





artery]   


 


O2 Saturation 94 16 L 92














  02/18/19





  05:00


 


Temperature 36.4 C L


 


Heart Rate [ 72





Monitoring 





electrodes] 


 


Respiratory 15





Rate 


 


Blood Pressure 166/84 H





[Left Brachial 





artery] 


 


O2 Saturation 95








                                     Oxygen











O2 Source [With Activity]      Nasal cannula


 


O2 Source                      Room air














I&O (Last 24 Hrs): 





                          Intake and Output Totals x24h











 02/16/19 02/17/19 02/18/19





 23:59 23:59 23:59


 


Intake Total 3510.333 3148.112 100


 


Output Total 2234 1030 


 


Balance 6347.088 4694.112 100











General: Alert


HEENT: Mucous membr. moist/pink


Neuro: Non Focal


Cardiovascular: Regular rate


Respiratory: No respiratory distress


Abdomen: Soft, Other (No bowel sounds)


Extremities: No edema





- Results


Results: 





                               Laboratory Results











WBC  13.9 x10^3/uL (4.8-10.8)  H  02/16/19  12:00    


 


RBC  3.34 10^6/uL (4.70-6.10)  L  02/16/19  12:00    


 


Hgb  10.6 g/dL (14.0-18.0)  L  02/16/19  12:00    


 


Hct  31.0 % (42.0-52.0)  L  02/16/19  12:00    


 


MCV  93.0 fL (80.0-94.0)   02/16/19  12:00    


 


MCH  31.7 pg (27.0-31.0)  H  02/16/19  12:00    


 


MCHC  34.1 g/dL (32.0-36.0)   02/16/19  12:00    


 


RDW  14.1 % (12.0-15.0)   02/16/19  12:00    


 


Plt Count  222 10^3/uL (130-450)   02/16/19  12:00    


 


MPV  7.3 fL (7.4-11.4)  L  02/16/19  12:00    


 


Neut # (Auto)  13.2 10^3/uL (1.5-6.6)  H  02/16/19  12:00    


 


Lymph # (Auto)  0.4 10^3/uL (1.5-3.5)  L  02/16/19  12:00    


 


Mono # (Auto)  0.3 10^3/uL (0.0-1.0)   02/16/19  12:00    


 


Eos # (Auto)  0.0 10^3/uL (0.0-0.7)   02/16/19  12:00    


 


Baso # (Auto)  0.0 10^3/uL (0.0-0.1)   02/16/19  12:00    


 


Absolute Nucleated RBC  0.00 x10^3/uL  02/16/19  12:00    


 


Total Counted  100   02/15/19  00:20    


 


Band Neuts % (Manual)  5 % (0-10)  02/15/19  00:20    


 


Abnorm Lymph % (Manual)  0 %  02/15/19  00:20    


 


Nucleated RBC %  0.0 /100WBC  02/16/19  12:00    


 


Neutrophils # (Manual)  1.4 10^3/uL (1.5-6.6)  L  02/15/19  00:20    


 


Lymphocytes # (Manual)  0.2 10^3/uL (1.5-3.5)  L  02/15/19  00:20    


 


Monocytes # (Manual)  0.0 10^3/uL (0.0-1.0)   02/15/19  00:20    


 


Eosinophils # (Manual)  0.0 10^3/uL (0-0.7)   02/15/19  00:20    


 


Basophils # (Manual)  0.0 10^3/uL (0-0.1)   02/15/19  00:20    


 


Differential Comment  MANUAL DIFFERENTIAL   02/15/19  00:20    


 


Platelet Estimate  NORMAL (130-450,000)  (NORMAL)   02/15/19  00:20    


 


RBC Morph Micro Appear  NORMAL APPEARANCE  (NORMAL)   02/15/19  00:20    


 


VBG pH  7.416  (7.31-7.41)  H  02/18/19  05:00    


 


Ionized Calcium  1.10 mmol/L (1.15-1.33)  L  02/18/19  05:00    


 


Sodium  134 mmol/L (135-145)  L  02/18/19  05:00    


 


Potassium  3.5 mmol/L (3.5-5.0)   02/18/19  05:00    


 


Chloride  107 mmol/L (101-111)   02/18/19  05:00    


 


Carbon Dioxide  22 mmol/L (21-32)   02/18/19  05:00    


 


Anion Gap  5.0  (6-13)  L  02/18/19  05:00    


 


BUN  7 mg/dL (6-20)   02/18/19  05:00    


 


Creatinine  0.6 mg/dL (0.6-1.2)   02/18/19  05:00    


 


Estimated GFR (MDRD)  129  (>89)   02/18/19  05:00    


 


Glucose  93 mg/dL ()   02/18/19  05:00    


 


Lactic Acid  1.2 mmol/L (0.5-2.2)   02/16/19  06:01    


 


Calcium  7.4 mg/dL (8.5-10.3)  L  02/18/19  05:00    


 


Phosphorus  2.5 mg/dL (2.5-4.6)   02/18/19  05:00    


 


Magnesium  1.9 mg/dL (1.7-2.8)   02/18/19  05:00    


 


Total Bilirubin  0.7 mg/dL (0.2-1.0)   02/18/19  05:00    


 


AST  14 IU/L (10-42)   02/18/19  05:00    


 


ALT  13 IU/L (10-60)   02/18/19  05:00    


 


Alkaline Phosphatase  58 IU/L ()   02/18/19  05:00    


 


Troponin I  < 0.04 ng/mL (<0.49)   02/15/19  14:22    


 


Total Protein  4.5 g/dL (6.7-8.2)  L  02/18/19  05:00    


 


Albumin  1.7 g/dL (3.2-5.5)  L  02/18/19  05:00    


 


Globulin  2.8 g/dL (2.1-4.2)   02/18/19  05:00    


 


Albumin/Globulin Ratio  0.6  (1.0-2.2)  L  02/18/19  05:00    


 


Lipase  22 U/L (22-51)   02/15/19  00:20    














- Procedures


Procedures: 





Procedures





INCIS W REM OF FORIEGN BODY OR DEV FROM SKIN & SUBCUT TISSUE (05/30/14)

## 2019-02-19 LAB
ALBUMIN DIAFP-MCNC: 1.7 G/DL (ref 3.2–5.5)
ALBUMIN/GLOB SERPL: 0.6 {RATIO} (ref 1–2.2)
ALP SERPL-CCNC: 59 IU/L (ref 42–121)
ALT SERPL W P-5'-P-CCNC: 11 IU/L (ref 10–60)
ANION GAP SERPL CALCULATED.4IONS-SCNC: 7 MMOL/L (ref 6–13)
AST SERPL W P-5'-P-CCNC: 11 IU/L (ref 10–42)
BILIRUB BLD-MCNC: 0.7 MG/DL (ref 0.2–1)
BUN SERPL-MCNC: 6 MG/DL (ref 6–20)
CALCIUM UR-MCNC: 7.4 MG/DL (ref 8.5–10.3)
CHLORIDE SERPL-SCNC: 101 MMOL/L (ref 101–111)
CO2 SERPL-SCNC: 23 MMOL/L (ref 21–32)
CREAT SERPLBLD-SCNC: 0.7 MG/DL (ref 0.6–1.2)
GFRSERPLBLD MDRD-ARVRAT: 108 ML/MIN/{1.73_M2} (ref 89–?)
GLOBULIN SER-MCNC: 2.8 G/DL (ref 2.1–4.2)
GLUCOSE SERPL-MCNC: 93 MG/DL (ref 70–100)
MAGNESIUM SERPL-MCNC: 1.8 MG/DL (ref 1.7–2.8)
PHOSPHATE BLD-MCNC: 2.1 MG/DL (ref 2.5–4.6)
PROT SPEC-MCNC: 4.5 G/DL (ref 6.7–8.2)
SODIUM SERPLBLD-SCNC: 131 MMOL/L (ref 135–145)

## 2019-02-19 RX ADMIN — FAMOTIDINE SCH MG: 20 TABLET, FILM COATED ORAL at 20:07

## 2019-02-19 RX ADMIN — SODIUM CHLORIDE, PRESERVATIVE FREE PRN ML: 5 INJECTION INTRAVENOUS at 05:12

## 2019-02-19 RX ADMIN — SODIUM CHLORIDE, PRESERVATIVE FREE SCH ML: 5 INJECTION INTRAVENOUS at 16:18

## 2019-02-19 RX ADMIN — POTASSIUM CHLORIDE, DEXTROSE MONOHYDRATE AND SODIUM CHLORIDE SCH MLS/HR: 150; 5; 900 INJECTION, SOLUTION INTRAVENOUS at 16:18

## 2019-02-19 RX ADMIN — HYDROCODONE BITARTRATE AND ACETAMINOPHEN PRN TAB: 5; 325 TABLET ORAL at 16:22

## 2019-02-19 RX ADMIN — SODIUM CHLORIDE SCH MLS/HR: 9 INJECTION, SOLUTION INTRAVENOUS at 02:58

## 2019-02-19 RX ADMIN — SODIUM CHLORIDE, PRESERVATIVE FREE SCH ML: 5 INJECTION INTRAVENOUS at 08:47

## 2019-02-19 RX ADMIN — SODIUM CHLORIDE, PRESERVATIVE FREE PRN ML: 5 INJECTION INTRAVENOUS at 18:04

## 2019-02-19 RX ADMIN — HYDROCODONE BITARTRATE AND ACETAMINOPHEN PRN TAB: 5; 325 TABLET ORAL at 03:55

## 2019-02-19 RX ADMIN — Medication PRN UNIT: at 05:11

## 2019-02-19 RX ADMIN — FAMOTIDINE SCH MLS/HR: 20 INJECTION, SOLUTION INTRAVENOUS at 08:46

## 2019-02-19 RX ADMIN — SODIUM CHLORIDE SCH MLS/HR: 9 INJECTION, SOLUTION INTRAVENOUS at 08:47

## 2019-02-19 RX ADMIN — ENOXAPARIN SODIUM SCH MG: 100 INJECTION SUBCUTANEOUS at 08:46

## 2019-02-19 NOTE — PROVIDER PROGRESS NOTE
Subjective





- Prog Note Date


Prog Note Date: 02/19/19


Prog Note Time: 09:21





- Subjective


Pt reports feeling: Improved (Still without pain.  His fullness/distension is 

much improved.  Passing gas.  No BM.  Ambulating.  Advanced to regular diet and 

tolerating small amounts.)





Objective





- Vital Signs/Intake & Output


Vital Signs: 


                                Vital Signs x48h











  Temp Pulse Resp BP Pulse Ox


 


 02/19/19 07:48  36.4 C L  66  18  162/78 H  98


 


 02/19/19 04:04  37.2 C  76  15  147/75 H  94











Intake & Output: 


                                 Intake & Output











 02/16/19 02/17/19 02/18/19 02/19/19





 23:59 23:59 23:59 23:59


 


Intake Total 3510.333 3148.112 3179.683 330


 


Output Total 2234 1030  


 


Balance 7094.508 5191.112 3179.683 330














- Objective


General Appearance: positive: No acute distress


Eyes Bilateral: positive: Normal inspection


ENT: positive: ENT inspection nml


Neck: positive: Nml inspection


Respiratory: positive: Chest non-tender


Abdomen: positive: Non-tender


Back: positive: Nml inspection


Skin: positive: Color nml


Extremities: positive: Non-tender


Neurologic/Psychiatric: positive: Oriented x3





- Lab Results


Fish Bones: 


                                 02/16/19 12:00





                                 02/19/19 05:13


Other Labs: 


                               Lab Results x24hrs











  02/19/19 Range/Units





  05:13 


 


Sodium  131 L  (135-145)  mmol/L


 


Potassium  3.2 L  (3.5-5.0)  mmol/L


 


Chloride  101  (101-111)  mmol/L


 


Carbon Dioxide  23  (21-32)  mmol/L


 


Anion Gap  7.0  (6-13)  


 


BUN  6  (6-20)  mg/dL


 


Creatinine  0.7  (0.6-1.2)  mg/dL


 


Estimated GFR (MDRD)  108  (>89)  


 


Glucose  93  ()  mg/dL


 


Calcium  7.4 L  (8.5-10.3)  mg/dL


 


Phosphorus  2.1 L  (2.5-4.6)  mg/dL


 


Magnesium  1.8  (1.7-2.8)  mg/dL


 


Total Bilirubin  0.7  (0.2-1.0)  mg/dL


 


AST  11  (10-42)  IU/L


 


ALT  11  (10-60)  IU/L


 


Alkaline Phosphatase  59  ()  IU/L


 


Total Protein  4.5 L  (6.7-8.2)  g/dL


 


Albumin  1.7 L  (3.2-5.5)  g/dL


 


Globulin  2.8  (2.1-4.2)  g/dL


 


Albumin/Globulin Ratio  0.6 L  (1.0-2.2)  














Assessment/Plan





- Problem List


(1) Perforated bowel


Impression: 


Pt continues to improve.  Continue expectant management.

## 2019-02-19 NOTE — PROVIDER PROGRESS NOTE
Subjective





- Prog Note Date


Prog Note Date: 02/19/19


Prog Note Time: 15:52





- Subjective


Pt reports feeling: Improved


Subjective: 


This morning he back me for food.  He says he is been eating clear liquids and 

he just has about had it.  He says he is hungry, but wants to eat solid food not

to clear liquids anymore.





As such I advance his diet to regular diet.  He has tolerated that.  Still 

eating only small amounts of that.  Passing flatus.  No fever no chills.  Denies

chest pain, cough, shortness of breath.Walking in the hallways over 200 feet








Current Medications





- Current Medications


Current Medications: 





Active Medications





Hydrocodone Bitart/Acetaminophen (Norco 5/325)  1 tab PO Q4HR PRN


   PRN Reason: PAIN


   Last Admin: 02/19/19 03:55 Dose:  1 tab


Enoxaparin Sodium (Lovenox)  40 mg SUBQ DAILY [facility]


   Last Admin: 02/19/19 08:46 Dose:  40 mg


Heparin Sodium (Beef Lung) ()  30 - 50 unit IVP PRN PRN


   PRN Reason: Central Line Protocol (<24 hr)


   Last Admin: 02/19/19 05:11 Dose:  60 unit


Hydralazine HCl (Apresoline Inj)  10 mg IVP Q6H PRN


   PRN Reason: Hypertensive Emergency


Famotidine (Pepcid 20 Mg/50 Ml)  50 mls @ 100 mls/hr IV BID [facility]


   Last Infusion: 02/19/19 09:24 Dose:  Infused


Potassium Chloride/Dextrose/Sod Cl ()  1,000 mls @ 40 mls/hr IV .Q25H [facility]


   Last Admin: 02/18/19 19:13 Dose:  40 mls/hr


Morphine Sulfate (Morphine (Carpuject))  2 mg IVP Q2HR PRN


   PRN Reason: PAIN


   Last Admin: 02/18/19 19:35 Dose:  2 mg


Ondansetron HCl (Zofran Inj)  4 mg IVP Q6HR PRN


   PRN Reason: Nausea / Vomiting


Sodium Chloride (Normal Saline Flush 0.9%)  10 ml IVP 0100,0900,1700 [facility]


   Last Admin: 02/19/19 08:47 Dose:  10 ml


Sodium Chloride (Normal Saline Flush 0.9%)  10 ml IVP PRN PRN


   PRN Reason: AS NEEDED PER PROVIDER ORDERS


   Last Admin: 02/19/19 05:12 Dose:  10 ml


Sodium Chloride (Normal Saline Flush 0.9%)  20 ml IVP PRN PRN


   PRN Reason: After Blood Draw


   Last Admin: 02/19/19 05:12 Dose:  20 ml





                                        





Aspirin 162 mg PO DAILY 07/10/13 


Acetaminophen/Diphenhydramine [Tylenol Pm Ex-Strength Caplet] 1 each PO QPM PRN 

02/08/19 











Objective





- Vital Signs/Intake & Output


Reviewed Vital Signs: Yes


Vital Signs: 


                                Vital Signs x48h











  Temp Pulse Resp BP Pulse Ox


 


 02/19/19 13:00  36.5 C  66  16  159/80 H  100











Intake & Output: 


                                 Intake & Output











 02/16/19 02/17/19 02/18/19 02/19/19





 23:59 23:59 23:59 23:59


 


Intake Total 3510.333 3148.112 3179.683 720


 


Output Total 2234 1030  


 


Balance 2002.225 5926.112 3179.683 720














- Objective


General Appearance: positive: No acute distress, Alert, Other (Very slender 

short statured white male who is 5 foot 1 and 51 kg)


Eyes Bilateral: positive: PERRL, EOMI


ENT: positive: Pharynx nml


Neck: positive: No JVD.  negative: Stiff neck, Carotid bruit


Respiratory: positive: Chest non-tender, No respiratory distress, Other (Slow 

shallow respirations, diminished at the bases).  negative: Wheezes, Rales, 

Rhonchi


Cardiovascular: positive: Regular rate & rhythm.  negative: Gallop/S4, Friction 

rub


Abdomen: positive: Non-tender, No organomegaly, Nml bowel sounds, No distention


Skin: positive: Warm, Dry


Extremities: positive: Full ROM, No pedal edema


Neurologic/Psychiatric: positive: Oriented x3, CN's nml (2-12), Motor nml





- Lab Results


Fish Bones: 


                                 02/16/19 12:00





                                 02/19/19 05:13


Other Labs: 


                               Lab Results x24hrs











  02/19/19 Range/Units





  05:13 


 


Sodium  131 L  (135-145)  mmol/L


 


Potassium  3.2 L  (3.5-5.0)  mmol/L


 


Chloride  101  (101-111)  mmol/L


 


Carbon Dioxide  23  (21-32)  mmol/L


 


Anion Gap  7.0  (6-13)  


 


BUN  6  (6-20)  mg/dL


 


Creatinine  0.7  (0.6-1.2)  mg/dL


 


Estimated GFR (MDRD)  108  (>89)  


 


Glucose  93  ()  mg/dL


 


Calcium  7.4 L  (8.5-10.3)  mg/dL


 


Phosphorus  2.1 L  (2.5-4.6)  mg/dL


 


Magnesium  1.8  (1.7-2.8)  mg/dL


 


Total Bilirubin  0.7  (0.2-1.0)  mg/dL


 


AST  11  (10-42)  IU/L


 


ALT  11  (10-60)  IU/L


 


Alkaline Phosphatase  59  ()  IU/L


 


Total Protein  4.5 L  (6.7-8.2)  g/dL


 


Albumin  1.7 L  (3.2-5.5)  g/dL


 


Globulin  2.8  (2.1-4.2)  g/dL


 


Albumin/Globulin Ratio  0.6 L  (1.0-2.2)  














ABX Reporting


Has patient been on IV antibiotics over the past 48 hours?: Yes





Assessment/Plan





- Problem List


(1) Perforated bowel


Impression: 


From perforated cecal tumor, status post right hemicolectomy, postoperative day 

#4


The surgeon ordered a clear liquid diet. Now advanced to regular diet..


Decreased peripheral iv rate.


Monitor BMP daily.








(2) HTN (hypertension)


Assessment/Plan: 


He had been hypertensive for > 24 hours.


Added prn iv Hydralazine to manage BP.


He does not take any blood pressure medicines at home.  Now that he is taking 

p.o.  We will start him on Norvasc.








(3) Hyponatremia


Assessment/Plan: 


Slightly better Na.


Continue peripheral iv with saline.


Monitor electrolytes daily.








(4) Hypokalemia


Assessment/Plan: 


Had resolved, but present on today's labs.


Supplement po.








(5) Hypotension


Assessment/Plan: 


Resolved with iv volume replacement.

## 2019-02-20 VITALS — DIASTOLIC BLOOD PRESSURE: 69 MMHG | SYSTOLIC BLOOD PRESSURE: 144 MMHG

## 2019-02-20 RX ADMIN — SODIUM CHLORIDE, PRESERVATIVE FREE SCH: 5 INJECTION INTRAVENOUS at 01:40

## 2019-02-20 RX ADMIN — SODIUM CHLORIDE, PRESERVATIVE FREE SCH: 5 INJECTION INTRAVENOUS at 08:54

## 2019-02-20 RX ADMIN — MORPHINE SULFATE PRN MG: 2 INJECTION, SOLUTION INTRAMUSCULAR; INTRAVENOUS at 12:18

## 2019-02-20 RX ADMIN — ENOXAPARIN SODIUM SCH MG: 100 INJECTION SUBCUTANEOUS at 08:53

## 2019-02-20 RX ADMIN — FAMOTIDINE SCH MG: 20 TABLET, FILM COATED ORAL at 08:53

## 2019-02-20 NOTE — DISCHARGE PLAN
Discharge Plan


Disposition: 01 Home, Self Care


Condition: Good


Prescriptions: 


amLODIPine [Norvasc] 5 mg PO DAILY #30 tablet


Diet: Regular


Activity Restrictions: Activity as Tolerated


Shower Restrictions: No


Driving Restrictions: Yes (no driving)


Additional Instructions or Follow Up instructions: 


You were admitted to the hospital because of severe, abrupt abdominal pain.  In 

coming to the emergency room you were identified as having air, inflammation and

infection in your abdomen.  You immediately went to the emergency room and the 

surgeon found you to have a hole in your colon.  The hole was from a  tumor that

caused your colon wall to rupture.  You have had your right bowel removed and 

your colon was hooked back up without needing to give you a colostomy.  Lymph 

nodes are positive.  The final pathology of this tumor is pending.  You will 

need to follow-up with Dr. Castellanos in 1 week to find out the final pathology 

report.  He also needs to look at your incisions to make sure they are healing 

well.





Keep your incisions clean and dry.  You may take a shower but dry them 

immediately.  Do not soak in a bathtub.  Do not drive.  Do not lift anything 

greater than 5 pounds for the next 4 weeks.





In addition to see Dr. Castellanos in 1 week, you should follow-up with your 

primary care provider, En Main.  Dr. Main may need to refer you to an 

oncologist.





While here, your blood pressure was very elevated.  We had to start you on 

Norvasc for blood pressure control.  Please see Dr. Main to look at your blood

pressure in 1-2 weeks.


No Smoking: If you smoke, Please STOP!  Call [phone] for help.


Follow-up with: 


En Main MD [Primary Care Provider] - 


Medhat Castellanos MD [Provider Admit Priv/Credential] -

## 2019-02-20 NOTE — DISCHARGE SUMMARY
Discharge Summary


Admit Date: 02/15/19


Discharge Date: 02/20/19


Discharging Provider: shankar


Code Status: Attempt Resuscitation


Condition at Discharge: Good


Discharge Disposition: 01 Home, Self Care





- DIAGNOSES


Admission Diagnoses: 





SBO


Discharge Diagnoses with Status of Each Condition: 





Obstructing, perforated cecal tumor - resected





- HPI


History of Present Illness: 





84 yo man presented to the ER with peritonitis and pneumoperitoneum.  On 

laparotomy, found to have a perforated, obstructing cecal tumor with involved 

lymph nodes.  He underwent a right hemicolectomy and was primarily anastomosed. 

He recovered without event and was discharged POD 5 tolerating regular diet, 

ambulating, and having BMs.





- HOSPITAL COURSE


Hospital Course: 





84 yo man presented to the ER with peritonitis and pneumoperitoneum.  On 

laparotomy, found to have a perforated, obstructing cecal tumor with involved 

lymph nodes.  He underwent a right hemicolectomy and was primarily anastomosed. 

He recovered without event and was discharged POD 5 tolerating regular diet, 

ambulating, and having BMs.





- ALLERGIES


Allergies/Adverse Reactions: 


                                    Allergies











Allergy/AdvReac Type Severity Reaction Status Date / Time


 


No Known Drug Allergies Allergy   Verified 02/15/19 00:18














- MEDICATIONS


Home Medications: 


                                Ambulatory Orders











 Medication  Instructions  Recorded  Confirmed


 


Aspirin 162 mg PO DAILY 07/10/13 02/15/19


 


Acetaminophen/Diphenhydramine 1 each PO QPM PRN 02/08/19 02/15/19





[Tylenol Pm Ex-Strength Caplet]   


 


Calcium Carbonate [Tums (Calcium 500 mg PO BID PRN  tablet 02/11/19 02/15/19





Carbonate 500mg)]   


 


Famotidine [Pepcid] 20 mg PO BID  tablet 02/11/19 02/15/19


 


Ondansetron HCl [Zofran] 4 mg PO Q8HR #10 tablet 02/11/19 02/15/19


 


Polyethylene Glycol 3350 [Miralax] 17 gm PO DAILY #7 packet 02/11/19 02/15/19














- PHYSICAL EXAM AT DISCHARGE


General Appearance: positive: No acute distress


Eyes Bilateral: positive: Normal inspection


ENT: positive: ENT inspection nml


Neck: positive: Nml inspection


Respiratory: positive: Chest non-tender


Abdomen: positive: Non-tender


Back: positive: Nml inspection


Skin: positive: Color nml


Extremities: positive: Non-tender


Neurologic/Psychiatric: positive: Oriented x3





- LABS


Result Diagrams: 


                                 02/16/19 12:00





                                 02/19/19 05:13





- FOLLOW UP


Follow Up: 





1 week

## 2019-02-25 ENCOUNTER — HOSPITAL ENCOUNTER (INPATIENT)
Dept: HOSPITAL 76 - ED | Age: 84
LOS: 8 days | Discharge: NURSING, REHAB OR LONG-TERM CARE | DRG: 853 | End: 2019-03-05
Attending: INTERNAL MEDICINE | Admitting: SPECIALIST
Payer: MEDICARE

## 2019-02-25 DIAGNOSIS — Z90.49: ICD-10-CM

## 2019-02-25 DIAGNOSIS — K91.89: ICD-10-CM

## 2019-02-25 DIAGNOSIS — I96: ICD-10-CM

## 2019-02-25 DIAGNOSIS — F32.9: ICD-10-CM

## 2019-02-25 DIAGNOSIS — E87.6: ICD-10-CM

## 2019-02-25 DIAGNOSIS — E46: ICD-10-CM

## 2019-02-25 DIAGNOSIS — Z87.891: ICD-10-CM

## 2019-02-25 DIAGNOSIS — J90: ICD-10-CM

## 2019-02-25 DIAGNOSIS — K65.9: ICD-10-CM

## 2019-02-25 DIAGNOSIS — A41.9: Primary | ICD-10-CM

## 2019-02-25 DIAGNOSIS — F17.200: ICD-10-CM

## 2019-02-25 DIAGNOSIS — R65.21: ICD-10-CM

## 2019-02-25 DIAGNOSIS — E87.2: ICD-10-CM

## 2019-02-25 DIAGNOSIS — K63.1: ICD-10-CM

## 2019-02-25 DIAGNOSIS — D64.9: ICD-10-CM

## 2019-02-25 DIAGNOSIS — E87.70: ICD-10-CM

## 2019-02-25 DIAGNOSIS — Z79.82: ICD-10-CM

## 2019-02-25 DIAGNOSIS — I95.1: ICD-10-CM

## 2019-02-25 DIAGNOSIS — T68.XXXA: ICD-10-CM

## 2019-02-25 DIAGNOSIS — I10: ICD-10-CM

## 2019-02-25 DIAGNOSIS — I99.8: ICD-10-CM

## 2019-02-25 DIAGNOSIS — L98.9: ICD-10-CM

## 2019-02-25 DIAGNOSIS — Z66: ICD-10-CM

## 2019-02-25 DIAGNOSIS — C18.0: ICD-10-CM

## 2019-02-25 DIAGNOSIS — Z92.21: ICD-10-CM

## 2019-02-25 DIAGNOSIS — T44.4X5A: ICD-10-CM

## 2019-02-25 DIAGNOSIS — I95.9: ICD-10-CM

## 2019-02-25 LAB
ALBUMIN DIAFP-MCNC: 1.8 G/DL (ref 3.2–5.5)
ALBUMIN/GLOB SERPL: 0.6 {RATIO} (ref 1–2.2)
ALP SERPL-CCNC: 47 IU/L (ref 42–121)
ALT SERPL W P-5'-P-CCNC: 12 IU/L (ref 10–60)
ANION GAP SERPL CALCULATED.4IONS-SCNC: 10 MMOL/L (ref 6–13)
ARTERIAL PATENCY WRIST A: (no result)
AST SERPL W P-5'-P-CCNC: 19 IU/L (ref 10–42)
BASE EXCESS BLDMV CALC-SCNC: -10 MMOL/L (ref -2–3)
BASOPHILS # BLD MANUAL: 0 10^3/UL (ref 0–0.1)
BASOPHILS NFR BLD AUTO: 0.1 %
BILIRUB BLD-MCNC: 0.7 MG/DL (ref 0.2–1)
BUN SERPL-MCNC: 14 MG/DL (ref 6–20)
CALCIUM UR-MCNC: 8 MG/DL (ref 8.5–10.3)
CHLORIDE SERPL-SCNC: 102 MMOL/L (ref 101–111)
CLARITY UR REFRACT.AUTO: CLEAR
CO2 BLDA CALC-SCNC: 17 MMOL/L (ref 21–29)
CO2 SERPL-SCNC: 26 MMOL/L (ref 21–32)
CREAT SERPLBLD-SCNC: 1 MG/DL (ref 0.6–1.2)
DEPRECATED HCO3 PLAS-SCNC: 16.2 MMOL/L (ref 22–26)
EOSINOPHIL # BLD MANUAL: 0 10^3/UL (ref 0–0.7)
EOSINOPHIL NFR BLD AUTO: 0 %
ERYTHROCYTE [DISTWIDTH] IN BLOOD BY AUTOMATED COUNT: 14 % (ref 12–15)
FIO2: 85
GFRSERPLBLD MDRD-ARVRAT: 72 ML/MIN/{1.73_M2} (ref 89–?)
GLOBULIN SER-MCNC: 3 G/DL (ref 2.1–4.2)
GLUCOSE SERPL-MCNC: 122 MG/DL (ref 70–100)
GLUCOSE UR QL STRIP.AUTO: NEGATIVE MG/DL
HGB UR QL STRIP: 11.2 G/DL (ref 14–18)
KETONES UR QL STRIP.AUTO: (no result) MG/DL
LIPASE SERPL-CCNC: 22 U/L (ref 22–51)
LYMPH ABN NFR BLD MANUAL: 0 %
LYMPHOBLASTS # BLD: 4 %
LYMPHOCYTES # BLD MANUAL: 0.5 10^3/UL (ref 1.5–3.5)
LYMPHOCYTES NFR BLD AUTO: 2.1 %
MAGNESIUM SERPL-MCNC: 2.1 MG/DL (ref 1.7–2.8)
MANUAL DIF COMMENT BLD-IMP: (no result)
MCH RBC QN AUTO: 30.7 PG (ref 27–31)
MCHC RBC AUTO-ENTMCNC: 33.3 G/DL (ref 32–36)
MCV RBC AUTO: 92.3 FL (ref 80–94)
MONOCYTES # BLD MANUAL: 0.8 10^3/UL (ref 0–1)
MONOCYTES NFR BLD AUTO: 2.8 %
NEUTROPHILS # SNV AUTO: 12.5 X10^3/UL (ref 4.8–10.8)
NEUTROPHILS NFR BLD AUTO: 95 %
NEUTROPHILS NFR BLD MANUAL: 11.3 10^3/UL (ref 1.5–6.6)
NEUTS BAND NFR BLD MANUAL: 83 %
NEUTS BAND NFR BLD: 7 %
NITRITE UR QL STRIP.AUTO: NEGATIVE
PCO2 TEMP ADJ BLDCOA: 31 MMHG (ref 34–45)
PDW BLD AUTO: 7.3 FL (ref 7.4–11.4)
PH TEMP ADJ BLDA: 7.32 [PH] (ref 7.35–7.45)
PH UR STRIP.AUTO: 5 PH (ref 5–7.5)
PLAT MORPH BLD: (no result)
PLATELET # BLD: 517 10^3/UL (ref 130–450)
PLATELET BLD QL SMEAR: (no result)
PO2 TEMP ADJ BLDCOA: 166 MMHG (ref 80–100)
PROT SPEC-MCNC: 4.8 G/DL (ref 6.7–8.2)
PROT UR STRIP.AUTO-MCNC: 30 MG/DL
RBC # UR STRIP.AUTO: (no result) /UL
RBC # URNS HPF: (no result) /HPF (ref 0–5)
RBC MAR: 3.65 10^6/UL (ref 4.7–6.1)
RBC MORPH BLD: (no result)
SAO2 % BLDA FROM PO2: 99 % (ref 94–98)
SODIUM SERPLBLD-SCNC: 138 MMOL/L (ref 135–145)
SP GR UR STRIP.AUTO: 1.01 (ref 1–1.03)
SQUAMOUS #/AREA URNS HPF: (no result) /HPF
SQUAMOUS URNS QL MICRO: (no result)
UROBILINOGEN UR QL STRIP.AUTO: (no result) E.U./DL
UROBILINOGEN UR STRIP.AUTO-MCNC: NEGATIVE MG/DL

## 2019-02-25 PROCEDURE — 81001 URINALYSIS AUTO W/SCOPE: CPT

## 2019-02-25 PROCEDURE — 94002 VENT MGMT INPAT INIT DAY: CPT

## 2019-02-25 PROCEDURE — 97165 OT EVAL LOW COMPLEX 30 MIN: CPT

## 2019-02-25 PROCEDURE — 94003 VENT MGMT INPAT SUBQ DAY: CPT

## 2019-02-25 PROCEDURE — 83735 ASSAY OF MAGNESIUM: CPT

## 2019-02-25 PROCEDURE — 81003 URINALYSIS AUTO W/O SCOPE: CPT

## 2019-02-25 PROCEDURE — 36556 INSERT NON-TUNNEL CV CATH: CPT

## 2019-02-25 PROCEDURE — 71046 X-RAY EXAM CHEST 2 VIEWS: CPT

## 2019-02-25 PROCEDURE — 82330 ASSAY OF CALCIUM: CPT

## 2019-02-25 PROCEDURE — 0DTB0ZZ RESECTION OF ILEUM, OPEN APPROACH: ICD-10-PCS | Performed by: SURGERY

## 2019-02-25 PROCEDURE — 71045 X-RAY EXAM CHEST 1 VIEW: CPT

## 2019-02-25 PROCEDURE — 0DSN0ZZ REPOSITION SIGMOID COLON, OPEN APPROACH: ICD-10-PCS | Performed by: SURGERY

## 2019-02-25 PROCEDURE — 86850 RBC ANTIBODY SCREEN: CPT

## 2019-02-25 PROCEDURE — 96375 TX/PRO/DX INJ NEW DRUG ADDON: CPT

## 2019-02-25 PROCEDURE — 97530 THERAPEUTIC ACTIVITIES: CPT

## 2019-02-25 PROCEDURE — 85610 PROTHROMBIN TIME: CPT

## 2019-02-25 PROCEDURE — 74177 CT ABD & PELVIS W/CONTRAST: CPT

## 2019-02-25 PROCEDURE — 85025 COMPLETE CBC W/AUTO DIFF WBC: CPT

## 2019-02-25 PROCEDURE — 87070 CULTURE OTHR SPECIMN AEROBIC: CPT

## 2019-02-25 PROCEDURE — 80053 COMPREHEN METABOLIC PANEL: CPT

## 2019-02-25 PROCEDURE — 97162 PT EVAL MOD COMPLEX 30 MIN: CPT

## 2019-02-25 PROCEDURE — 82803 BLOOD GASES ANY COMBINATION: CPT

## 2019-02-25 PROCEDURE — 87150 DNA/RNA AMPLIFIED PROBE: CPT

## 2019-02-25 PROCEDURE — 83605 ASSAY OF LACTIC ACID: CPT

## 2019-02-25 PROCEDURE — 82607 VITAMIN B-12: CPT

## 2019-02-25 PROCEDURE — 99285 EMERGENCY DEPT VISIT HI MDM: CPT

## 2019-02-25 PROCEDURE — 96374 THER/PROPH/DIAG INJ IV PUSH: CPT

## 2019-02-25 PROCEDURE — 97110 THERAPEUTIC EXERCISES: CPT

## 2019-02-25 PROCEDURE — 84100 ASSAY OF PHOSPHORUS: CPT

## 2019-02-25 PROCEDURE — 99291 CRITICAL CARE FIRST HOUR: CPT

## 2019-02-25 PROCEDURE — 80048 BASIC METABOLIC PNL TOTAL CA: CPT

## 2019-02-25 PROCEDURE — 96361 HYDRATE IV INFUSION ADD-ON: CPT

## 2019-02-25 PROCEDURE — 0D1B0Z4 BYPASS ILEUM TO CUTANEOUS, OPEN APPROACH: ICD-10-PCS | Performed by: SURGERY

## 2019-02-25 PROCEDURE — 36415 COLL VENOUS BLD VENIPUNCTURE: CPT

## 2019-02-25 PROCEDURE — 83880 ASSAY OF NATRIURETIC PEPTIDE: CPT

## 2019-02-25 PROCEDURE — 0DBL0ZZ EXCISION OF TRANSVERSE COLON, OPEN APPROACH: ICD-10-PCS | Performed by: SURGERY

## 2019-02-25 PROCEDURE — 87086 URINE CULTURE/COLONY COUNT: CPT

## 2019-02-25 PROCEDURE — 86900 BLOOD TYPING SEROLOGIC ABO: CPT

## 2019-02-25 PROCEDURE — 84478 ASSAY OF TRIGLYCERIDES: CPT

## 2019-02-25 PROCEDURE — 87205 SMEAR GRAM STAIN: CPT

## 2019-02-25 PROCEDURE — 51798 US URINE CAPACITY MEASURE: CPT

## 2019-02-25 PROCEDURE — 83690 ASSAY OF LIPASE: CPT

## 2019-02-25 PROCEDURE — 86920 COMPATIBILITY TEST SPIN: CPT

## 2019-02-25 PROCEDURE — 84134 ASSAY OF PREALBUMIN: CPT

## 2019-02-25 PROCEDURE — 86901 BLOOD TYPING SEROLOGIC RH(D): CPT

## 2019-02-25 RX ADMIN — SODIUM CHLORIDE SCH MLS/HR: 9 INJECTION, SOLUTION INTRAVENOUS at 21:34

## 2019-02-25 RX ADMIN — POTASSIUM CHLORIDE SCH MLS/HR: 14.9 INJECTION, SOLUTION INTRAVENOUS at 21:34

## 2019-02-25 RX ADMIN — HYDROMORPHONE HYDROCHLORIDE PRN MG: 1 INJECTION, SOLUTION INTRAMUSCULAR; INTRAVENOUS; SUBCUTANEOUS at 15:50

## 2019-02-25 RX ADMIN — SODIUM CHLORIDE, PRESERVATIVE FREE SCH ML: 5 INJECTION INTRAVENOUS at 17:17

## 2019-02-25 RX ADMIN — SODIUM CHLORIDE SCH MLS/HR: 900 INJECTION INTRAVENOUS at 20:51

## 2019-02-25 RX ADMIN — POTASSIUM CHLORIDE SCH MLS/HR: 14.9 INJECTION, SOLUTION INTRAVENOUS at 17:21

## 2019-02-25 NOTE — ANESTHESIA
Pre-Anesthesia VS, & Labs





- Diagnosis





peritonitis probably from and probable anastomotic leak. septic shock





- Procedure





exploratory laparotomy with ileostomy


Vital Signs: 





                                        











Temp Pulse Resp BP Pulse Ox


 


 36.7 C   88   10 L  94/65   100 


 


 02/25/19 14:46  02/25/19 14:46  02/25/19 14:46  02/25/19 14:46  02/25/19 14:46














                                        





Height                           5 ft 1 in


Weight (kg)                      53 kg


Body Mass Index                  18.8











- NPO


>8 hours





- Lab Results


Current Lab Results: 





Laboratory Tests





02/25/19 10:59: Lactic Acid 3.3 H*


02/25/19 10:59: Sodium 138, Potassium 3.3 L, Chloride 102, Carbon Dioxide 26, 

Anion Gap 10.0, BUN 14, Creatinine 1.0, Estimated GFR (MDRD) 72 L, Glucose 122 H

, Calcium 8.0 L, Magnesium 2.1, Total Bilirubin 0.7, AST 19, ALT 12, Alkaline 

Phosphatase 47, Total Protein 4.8 L, Albumin 1.8 L, Globulin 3.0, 

Albumin/Globulin Ratio 0.6 L, Lipase 22


02/25/19 10:59: WBC 12.5 H, RBC 3.65 L, Hgb 11.2 L, Hct 33.7 L, MCV 92.3, MCH 

30.7, MCHC 33.3, RDW 14.0, Plt Count 517 H, MPV 7.3 L, Neut # (Auto) Not 

Reportable, Lymph # (Auto) Not Reportable, Mono # (Auto) Not Reportable, Eos # 

(Auto) Not Reportable, Baso # (Auto) Not Reportable, Absolute Nucleated RBC Not 

Reportable, Total Counted 100, Band Neuts % (Manual) 7, Abnorm Lymph % (Manual) 

0, Nucleated RBC % Not Reportable, Neutrophils # (Manual) 11.3 H, Lymphocytes # 

(Manual) 0.5 L, Monocytes # (Manual) 0.8, Eosinophils # (Manual) 0.0, Basophils 

# (Manual) 0.0, Differential Comment MANUAL DIFFERENTIAL, Platelet Estimate 

INCREASED (>450,000), Platelet Morphology NORMAL APPEARANCE, RBC Morph Micro 

Appear NORMAL APPEARANCE








Fish Bones: 


                                 02/25/19 10:59





                                 02/25/19 10:59





Home Medications and Allergies





Active Medications





Hydromorphone HCl (Dilaudid Inj Carp)  1 mg IVP Q2HR PRN


   PRN Reason: Pain 8 to 10


Norepinephrine Bitartrate 8 mg (/ Dextrose)  250 mls @ 7.5 mls/hr IV .[id number] 

STA; Protocol


   Stop: 02/26/19 22:13


Famotidine (Pepcid 20 Mg/50 Ml)  50 mls @ 100 mls/hr IV DAILY [facility]


Sodium Chloride (Normal Saline 0.9%)  1,000 mls @ 100 mls/hr IV .Q10H [facility]


Ondansetron HCl (Zofran Inj)  4 mg IVP Q6HR PRN


   PRN Reason: Nausea / Vomiting


Ondansetron HCl (Zofran Odt)  4 mg TL Q6HR PRN


   PRN Reason: Nausea / Vomiting


Sodium Chloride (Normal Saline Flush 0.9%)  10 ml IVP 0100,0900,1700 [facility]


Sodium Chloride (Normal Saline Flush 0.9%)  10 ml IVP PRN PRN


   PRN Reason: AS NEEDED PER PROVIDER ORDERS





                                        





Aspirin 162 mg PO DAILY 07/10/13 


Acetaminophen/Diphenhydramine [Tylenol Pm Ex-Strength Caplet] 1 each PO QPM PRN 

02/08/19 








Allergies/Adverse Reactions: 


                                    Allergies











Allergy/AdvReac Type Severity Reaction Status Date / Time


 


No Known Drug Allergies Allergy   Verified 02/15/19 00:18














Anes History & Medical History





- Anesthetic History


Anesthesia Complications: reports: No previous complications


Family history of Anesthesia Complications: Denies


Family history of Malignant Hyperthermia: Denies (atedrperfo bleproba)





- Medical History


Cardiovascular: reports: Hypertension


Pulmonary: reports: None


Gastrointestinal: reports: Other (probable perforated bowel)


Urinary: reports: None


Neuro: reports: None


Musculoskeletal: reports: None


Endocrine/Autoimmune: reports: None


Blood Disorders: reports: None


Skin: reports: None


Smoking Status: Current every day smoker





- Surgical History


General: Bowel surgery, Colonoscopy





Exam


General: Moderate distress


Dental: Dentures full Upper, Dentures full Lower


Mouth Opening: 3 Fingerbreadth


Neck Mobility: Normal


Mallampati classification: II


Thyromental Distance: 4-6 cm


Respiratory: Rhonchi


Cardiovascular: Regular rate, Normal S1, Normal S2, No murmurs





Plan


Anesthesia Type: General


Consent for Procedure(s) Verified and Reviewed: Yes


Code Status: Do Not Attempt Resuscitation


ASA classification: 4-Incapacitating disease


Is this case an emergency?: Yes

## 2019-02-25 NOTE — CT REPORT
Reason:  abd pain overnight; s/p colon surgery

Procedure Date:  02/25/2019   

Accession Number:  [id number] / [id number]                    

Procedure:  CT  - Abdomen/Pelvis W CPT Code:  

 

FULL RESULT:

 

 

EXAM:

CT ABDOMEN AND PELVIS

 

EXAM DATE: 2/25/2019 11:45 AM.

 

CLINICAL HISTORY: Abdominal pain overnight; status post colon surgery.

 

COMPARISONS: Abdomen/pelvis with contrast 02/15/2019 2:09 AM.

 

TECHNIQUE: Routine helical CT imaging was performed through the abdomen 

and pelvis. IV contrast: OPTI 320 80mL. Enteric contrast: No. 

Reconstructions: Coronal and sagittal.

 

In accordance with CT protocol optimization, one or more of the following 

dose reduction techniques were utilized for this exam: automated exposure 

control, adjustment of mA and/or KV based on patient size, or use of 

iterative reconstructive technique.

 

FINDINGS:

Lung Bases: Small left and moderate right pleural effusion, increased 

compared to 02/15/2019. Associated bibasilar consolidation, right greater 

than left.

 

Liver: Hepatic hypodensities which are too small to characterize appear 

overall unchanged compared to the recent study.

 

Gallbladder/Bile Ducts: Edema of the gallbladder wall is felt to likely 

be related to the ascites. There is mild intrahepatic biliary ductal 

dilation, similar to prior.

 

Spleen: Normal.

 

Pancreas: The pancreatic duct is prominent throughout the pancreas, up to 

3 mm.

 

Adrenal Glands: Normal.

 

Kidneys: A few left renal calcifications represent nonobstructing calculi 

versus vascular calcifications, less than 4 mm. No hydronephrosis.

 

Peritoneal Cavity/Bowel: There is persistent ascites as well as free air, 

interval decrease in volume of the free air. No bowel obstruction is 

identified. Patient is status post bowel resection. Sensitivity for 

lymphadenopathy is decreased in the setting of large volume ascites, no 

definite lymphadenopathy is seen.

 

Pelvic Organs: Markedly distended bladder.

 

Vasculature: Atherosclerosis without aneurysm.

 

Bones: No significant abnormality.

 

Other: None.

IMPRESSION:

 

Ascites and free air.

 

Increasing pleural effusions and consolidation, mostly on the right.

 

Markedly distended bladder.

 

RADIA cRITICAL RESULT: The findings were discussed with Dr. Ward on 

2/25/2019 at 12:02 PM.

## 2019-02-25 NOTE — PROVIDER PROGRESS NOTE
Nocturnist Note





- Nocturnist Note


Nocturnist Note: 


Patient is postop for perforated viscus with anastomotic leak with generalized 

peritonitis s/p Exploratory laparotomy, partial colectomy with ileostomy and 

Goode's pouch construction. Currently on ventilator being managed by RT with 

initial settings, phenylephrine and epi gtt. RN has called to request sedation 

protocol, however due to low BP's, septic shock and MAP<60 would defer for now 

per anesthesia.





O: VS unstable, AF, 92/62, RR 10 on vent with 100% o2sat. Settings: SIMV mode, 

PS 10, PEEP 5, RR 8,  ml


pupils react to direct light. no lesions


no jvd, no bruits


no murmurs, gallops, or clicks


no wheezing, rales or rhonchi


no edema





Labs: reviewed


Imaging studies: reviewed





A/P: 


1. 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 

perforated viscus with anastomotic leak with generalized peritonitis s/p 

Exploratory laparotomy, partial colectomy with ileostomy and Goode's pouch 

construction.


2. Septic with early distributive shock


3. Acute metabolic acidosis


4. 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


5. HTN


6. Depression





Plan: Continue with critical care mgmt, early goal directed tx. CVP monitoring 

however septic/distributive shock clinically. Zosyn/flagyl to continue. Vent 

mgmt per RT, serial LA levels, daily labs, CVP monitoring with pressor support 

to maintain MAP>60. Dr Almeida on the case. Patient with poor prognosis. Dr Almeida has updated the patients family. Serial ABG's show metabolic acidosis 

likely sec to poor tissue perfusion despite LA normalizing now from previous 

3.3>1.4. Would give sodium bicarb 100 meq x1 then 50 meq at 6 hr interval with 

recheck at 0500 to maintain pH>7.15, allowing for permissive hypercapnia, if 

refractory hypoxia seen may increase Fio2/PEEP or inspiratory time. CAM-ICU 

sedation assessment. Due to no sedation protocol in the setting of septic shock 

with hypotension would minimize paralytics with vecuronium with goal of daily 

interruption of continuous paralysis to assess readiness to extubate daily. 

Early mobility essential within 72 hrs of MV. Buprenorphine for sedation 50% 

reduction due to age and renal function. Head of bed to 45 degrees, 

chlorhexadine oral rinses, PPI for GI ppx, subglottic suctioning, SCD's for DVT 

ppx. 





Critical care time: 30 minutes.

## 2019-02-25 NOTE — OPERATIVE REPORT
Operative Report





- General


Admit Date: 02/25/19


Procedure Date: 02/25/19


Planned Procedure: exploratory laparotomy, possible ileostomy


Pre-Op Diagnosis: acute abdomen/peritonitis/suspected anastomotic leak


Procedure Performed: 





Exploratory laparotomy, partial colectomy with ileostomy and Goode's pouch 

construction.


Post Op Diagnosis: anastomotic leak with generalized peritonitis





- Procedure Note


Primary Surgeon: Niranjan Almeida MD FACS


Secondary Surgeon: Dirk Barnes MD FACS


Anesthesia Provider: Dirk Bruno MD


Anesthesia Technique: General ET tube


Pathology: 





ileocolic anastomosis


IV Fluids (mL): 900


Estimated Blood Loss (mL): 50


Urine Output (mL): 15


Complications: 





None

## 2019-02-25 NOTE — ED PHYSICIAN DOCUMENTATION
PD HPI ABD PAIN





- Stated complaint


Stated Complaint: ABD PX





- Chief complaint


Chief Complaint: Abd Pain





- History obtained from


History obtained from: Patient





- History of Present Illness


Timing - onset: How many hours ago (several), Today


Timing - duration: Hours


Timing - details: Abrupt onset, Still present


Quality: Cramping, Aching, Fullness/distended, Pain


Location: All over / everywhere


Radiation: No: Chest, Lower back


Improved by: No: Vomiting


Worsened by: Eating, Moving, Palpation.  No: Breathing


Associated symptoms: Nausea, Vomiting.  No: Fever, Diarrhea, Constipation, Near 

syncope / syncope


Similar symptoms before: Has not had sx before


Recently seen: Emergency Dept, Admitted, Surgery (He had a perforated viscus 

from a cecal tumor and had emergent partial colectomy here at [facility].  

He was doing well postop and discharged and had been ambulatory eating and 

stooling until today)





Review of Systems


Constitutional: denies: Fever


Nose: denies: Rhinorrhea / runny nose, Congestion


Throat: denies: Sore throat


Cardiac: denies: Chest pain / pressure, Palpitations


Respiratory: denies: Dyspnea, Cough


GI: reports: Abdominal Pain, Abdominal Swelling (today), Nausea


: denies: Dysuria


Neurologic: reports: Generalized weakness.  denies: Focal weakness, Numbness, 

Near syncope, Headache





PD PAST MEDICAL HISTORY





- Past Medical History


Cardiovascular: Hypertension


Respiratory: None


Neuro: None


Endocrine/Autoimmune: None


GI: Other


: None


HEENT: None


Psych: Depression


Musculoskeletal: None


Derm: None





- Past Surgical History


Past Surgical History: Yes


General: Bowel surgery, Colonoscopy





- Present Medications


Home Medications: 


                                Ambulatory Orders











 Medication  Instructions  Recorded  Confirmed


 


Aspirin 162 mg PO DAILY 07/10/13 02/15/19


 


Acetaminophen/Diphenhydramine 1 each PO QPM PRN 02/08/19 02/15/19





[Tylenol Pm Ex-Strength Caplet]   


 


Calcium Carbonate [Tums (Calcium 500 mg PO BID PRN  tablet 02/11/19 02/15/19





Carbonate 500mg)]   


 


Famotidine [Pepcid] 20 mg PO BID  tablet 02/11/19 02/15/19


 


Ondansetron HCl [Zofran] 4 mg PO Q8HR #10 tablet 02/11/19 02/15/19


 


Polyethylene Glycol 3350 [Miralax] 17 gm PO DAILY #7 packet 02/11/19 02/15/19


 


amLODIPine [Norvasc] 5 mg PO DAILY #30 tablet 02/20/19 














- Allergies


Allergies/Adverse Reactions: 


                                    Allergies











Allergy/AdvReac Type Severity Reaction Status Date / Time


 


No Known Drug Allergies Allergy   Verified 02/15/19 00:18














- Social History


Does the pt smoke?: Yes


Smoking Status: Current every day smoker


Does the pt drink ETOH?: No


Does the pt have substance abuse?: No





- Immunizations


Immunizations are current?: No





- POLST


Patient has POLST: No





PD ED PE NORMAL





- Vitals


Vital signs reviewed: Yes





- General


General: Alert and oriented X 3, Well developed/nourished, Other (appears in 

pain. Is awake and conversant. )





- Neck


Neck: Supple, no meningeal sign, No adenopathy





- Cardiac


Cardiac: RRR, No murmur





- Respiratory


Respiratory: Clear bilaterally





- Abdomen


Abdomen: Other (His abdomen is generally tender with distention and diminished 

bowel sounds.  There is some dullness to percussion.  The incision area appears 

healing without any signs of infection.)





- Male 


Male : Deferred





- Rectal


Rectal: Deferred





- Back


Back: No CVA TTP





- Derm


Derm: Warm and dry.  No: Normal color (pallor)





- Extremities


Extremities: No tenderness to palpate, Normal ROM s pain, No edema, No calf 

tenderness / cord





- Neuro


Neuro: Alert and oriented X 3, No motor deficit, Normal speech





Results





- Vitals


Vitals: 


                               Vital Signs - 24 hr











  02/25/19 02/25/19 02/25/19





  10:05 10:17 10:39


 


Temperature  36.1 C L 


 


Heart Rate 96 94 93


 


Respiratory 18 33 H 22





Rate   


 


Blood Pressure 91/57 L 80/59 L 82/54 L


 


O2 Saturation 99  99














  02/25/19 02/25/19 02/25/19





  10:41 11:14 11:56


 


Temperature   


 


Heart Rate 86 78 90


 


Respiratory 14 16 14





Rate   


 


Blood Pressure 109/73 94/62 114/72


 


O2 Saturation 100 98 97














  02/25/19





  12:23


 


Temperature 


 


Heart Rate 88


 


Respiratory 12





Rate 


 


Blood Pressure 94/76


 


O2 Saturation 100








                                     Oxygen











O2 Source [With Activity]      Nasal cannula


 


O2 Source                      Room air

















- Labs


Labs: 


                                Laboratory Tests











  02/25/19 02/25/19 02/25/19





  10:59 10:59 10:59


 


WBC  12.5 H  


 


RBC  3.65 L  


 


Hgb  11.2 L  


 


Hct  33.7 L  


 


MCV  92.3  


 


MCH  30.7  


 


MCHC  33.3  


 


RDW  14.0  


 


Plt Count  517 H  


 


MPV  7.3 L  


 


Neut # (Auto)  Not Reportable  


 


Lymph # (Auto)  Not Reportable  


 


Mono # (Auto)  Not Reportable  


 


Eos # (Auto)  Not Reportable  


 


Baso # (Auto)  Not Reportable  


 


Absolute Nucleated RBC  Not Reportable  


 


Total Counted  100  


 


Band Neuts % (Manual)  7  


 


Abnorm Lymph % (Manual)  0  


 


Nucleated RBC %  Not Reportable  


 


Neutrophils # (Manual)  11.3 H  


 


Lymphocytes # (Manual)  0.5 L  


 


Monocytes # (Manual)  0.8  


 


Eosinophils # (Manual)  0.0  


 


Basophils # (Manual)  0.0  


 


Differential Comment  MANUAL DIFFERENTIAL  


 


Platelet Estimate  INCREASED (>450,000)  


 


Platelet Morphology  NORMAL APPEARANCE  


 


RBC Morph Micro Appear  NORMAL APPEARANCE  


 


Sodium   138 


 


Potassium   3.3 L 


 


Chloride   102 


 


Carbon Dioxide   26 


 


Anion Gap   10.0 


 


BUN   14 


 


Creatinine   1.0 


 


Estimated GFR (MDRD)   72 L 


 


Glucose   122 H 


 


Lactic Acid    3.3 H*


 


Calcium   8.0 L 


 


Magnesium   2.1 


 


Total Bilirubin   0.7 


 


AST   19 


 


ALT   12 


 


Alkaline Phosphatase   47 


 


Total Protein   4.8 L 


 


Albumin   1.8 L 


 


Globulin   3.0 


 


Albumin/Globulin Ratio   0.6 L 


 


Lipase   22 














PD MEDICAL DECISION MAKING





- ED course


Complexity details: reviewed results, re-evaluated patient, considered 

differential, d/w patient, d/w consultant (Roxana Almeida, tab, and Dr. Mcghee, Hospitalist)


ED course: 





He has copious free fluid and free air consistent with perforated viscus.  This 

was a leak of the anastomosis from the recent surgery.  He is given IV fluids 

and with persistent hypotension in the 85-99 range, I also started 

norepinephrine.  The CT scan was consistent with the clinical findings.  I 

contacted Niranjan Almeida who is on for general surgery who will come evaluate the 

patient.  He also asked the hospitalist to admit the patient.  At this point he 

is potentially too unstable for surgery immediately and will go to the ICU for 

aggressive care.  This is a serious condition with high morbidity and mortality.

 The patient and his family agree and wish for aggressive treatment.





- Critical Care


Time(min): 50


Time Includes: Direct patient care, Review records, Reassess patient, Coordinate

care, See progress note


Data interpretation: Labs





Departure





- Departure


Disposition: [facility] DC/Xfer


Clinical Impression: 


 Perforated abdominal viscus, Status post partial colectomy





Hypotension


Qualifiers:


 Hypotension type: unspecified hypotension type Qualified Code(s): I95.9 - 

Hypotension, unspecified





Condition: Serious


Record reviewed to determine appropriate education?: Yes


Discharge Date/Time: 02/25/19 14:46

## 2019-02-25 NOTE — CONSULTATION NOTE
DATE OF SERVICE: 2019

Physician: Bibi Mcghee MD

 

PRIMARY CARE PROVIDER:  En Main MD

 

ADMITTING PROVIDER:  Bibi Mcghee MD 

 

SURGICAL CONSULTANT:  Niranjan Almeida MD 

 

CHIEF COMPLAINT:  Sudden onset severe abdominal pain in a patient who had a 
perforated cecal tumor on 02/15/2019 and underwent a right hemicolectomy with 
primary anastomosis.  Postoperative course was unremarkable.  Patient progressed
to full diet, normal bowel movements.  Discharged to home on 2019.  He now
returns with sudden onset abdominal pain.  It started earlier today, abrupt in 
onset.  It is getting worse, he has had no flatus, abdomen is getting more and 
more distended.  Denies fever, chills. There has been no vomiting.  He presented
to the emergency room where he is hypothermic at 36.1, hypotensive at 91/57 with
pulse oximetry 99%.  He is still alert, oriented, but very fatigued and getting 
more lethargic.  White cell count is 12.5.  He has no bowel sounds.  CT scan of 
the abdomen and pelvis shows ascites and free air, markedly distended bladder, 
increasing pleural effusions and consolidation mostly on the right.

 

Dr. Niranjan Almeida has been consulted on the patient.  He has requested the 
patient be admitted to the medicine service and he will consult as a surgeon.  
The patient is critically ill, central line is being placed in the emergency 
room by Anesthesia.  I am then transferring the patient to ICU for FULL 
RESUSCITATION.  Patient is DO NOT RESUSCITATE; however, family wants everything 
done for him to be taken to the OR and appropriately treated.

 

PAST MEDICAL HISTORY

1.  Stage IV diffuse large B-cell lymphoma diagnosed in .  Status post 6 
cycles of R-CHOP.  He presented as a small-bowel obstruction at that time.  He 
required a right colectomy including resection of his terminal ileum where the 
tumor was located.  CT scan for surveillance in  showed no evidence of 
disease.

2.  Cecal tumor.  Patient presented as recurrent small-bowel obstruction on 
2019.  Sent home.  Returned again a week later 2/15/19 where he had a 
complete bowel obstruction and had a second surgery.  Pathology for the second 
surgery showed infiltrating adenocarcinoma, moderate to poorly differentiated.  
Grade 3-4 with signet ring appearance.  It is essentially ulcerated polypoid 
mass at 4.7 x 4.0 x 1.8 cm.  Margins were negative, but 8/25 lymph nodes were 
positive for metastatic carcinoma.

3.  Hypertension.

4.  Depression.

 

ALLERGIES:  NO KNOWN DRUG ALLERGIES.

 

MEDICATIONS

1.  Tylenol with Benadryl at night.

2.  Two aspirin 162 mg daily.

3.  Zofran p.r.n.

4.  Norvasc 5 mg p.o. daily prescribed at last visit because of hypertension.

5.  Calcium with vitamin D daily.

6.  Pepcid 20 mg daily.

7.  MiraLax daily.

 

SOCIAL HISTORY:  He smoked for 65 years.  He quit late 2019.  Smokes 
cannabis about 2-3 times a day.  No history of alcohol abuse.  He lives with his
daughter and her .  Before this illness, he was independent at home and 
still driving.  He is  from his first wife.  He has spent the majority 
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 

 

FAMILY HISTORY:  Negative for cancer, heart attack, stroke in his parents.  They
 of old age.  Children are healthy.

 

REVIEW OF SYSTEMS:  Unobtainable at this time.  This patient is critically ill 
in the ICU in Trendelenburg position.  While he is awake and alert, he is not 
able to speak very much at this time.  

 

PHYSICAL EXAMINATION

VITAL SIGNS:  Temperature is 36.1.  Blood pressure is 91/57 on admission and has
gone down to 81/63.  Respirations are 13, 98% on room air. 

GENERAL:  He is a slender, cachectic, elderly gentleman, short stature, who is 
still with us with regard to mentation.  He has a quiet affect, and he says that
he is in agonizing pain from his abdomen.

HEAD AND NECK:  Unremarkable.  He has some temporal wasting, mild poor dentition
and dry oral mucosa, but still pink and moist.  Sclerae nonicteric.  Neck is 
supple.  No goiter or bruits. 

LUNGS:  Clear with hypoactive shallow respirations.  Unlabored.  No crackles, 
rhonchi or wheezing.

HEART:  PMI is normally placed.  No tachycardia with a soft systolic ejection 
murmur, left lower sternal border.

ABDOMEN:  Distended, hypoactive, with tenderness, rebound and guarding in all 
fields.

EXTREMITIES:  Without clubbing, cyanosis or edema and has diffuse osteoarthritic
deformities.

SKIN:  Cool, dry.

NEUROLOGIC:  He is alert, oriented to person, place and time.  Understands why 
he is here.  No focal deficits.  No cranial nerve deficits other than mildly 
deaf.

 

LABORATORY DATA:  Sodium 138, potassium 3.3, glucose 122, lactic acid 3.3, total
protein 4.8, albumin 1.8.  White cell count 12.5, hemoglobin 11.2, platelets 
517.  There is a left shift of neutrophils.

 

IMAGING:  CT scan as above.

 

ASSESSMENT/PLAN

1.  Septic shock with hypotension.  Source is peritonitis and probable 
anastomotic leak.

PLAN

    a.  Inpatient admission to ICU.

    b.  Attestation that the patient will be discharged within 96 hours. 

    c.  Surgical consult ongoing by Dr. Alemida.

    d.  Anesthesia consult ongoing for central line.

    e.  Supportive measures as demonstrated below.

 

2.  Peritonitis.  Most likely source is anastomotic leak.  The patient's 
condition is grave, poor prognosis.  Nevertheless, family and he want to proceed
with surgery.  He will be taken to the OR emergently. 



Plan:

Zosyn

Levophed

IVF

daily labs

 

3.  Cecal carcinoma.  Pathology as above.  If he survives this event, then maybe
can be referred to Oncology.  He is already followed by Naveen Rodriguez for his 
lymphoma.

 

4.  Chronic protein-calorie malnutrition with poor albumin status to begin with.
 Unfortunately, this will put him at increased risk. His protein is quite low. 
Plan on starting TPN ASAP to get him thru this and increase his change of 
survival. 

 

5.  Hypertension before discharge.  We will hold antihypertensives at this time.

 

6.  Deep venous thrombosis prophylaxis will be sequential compression devices 
and compression hose.

 

7.  DO NOT RESUSCITATE status if his heart and his lungs were to stop.  However,
he still wants full measures with regard to surgeries.

 

 

DD: 2019 14:02

TD: 2019 14:20

Job #: [id number]

[email or web address]

## 2019-02-25 NOTE — CONSULTATION NOTE
Referring Provider


Name of Referring Provider:: Dr. Mcghee


Consult Date: 02/25/19





Chief Complaint





- Chief Complaint


Chief Complaint: abd pain





History of Present Illness





- Admitted From


Admitted From:: ER





- History Obtained From


Records Reviewed: yes


History obtained from: pt, records


Exam Limitations: none





- History of Present Illness


HPI Comment/Other: 





82 yo male with sudden onset of severe generalized abdominal pain last night 

which awakened him from sleep. He was unable to get comfortable and presented to

the ER this morning for evaluation. He is 10 days s/p emergency right colectomy 

with ileo-transverse colonic anastomosis for perforated cecal carcinoma. He did 

well postop until last night, with good appetite, satisfactory bowel function, 

ambulating well until last night. The path from his recent colectomy revealed 

pT4N2 invasive adenocarcinoma. His recent surgery was thought to include an R0 

resection. 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 There has been gradual 

weight loss over the past several years; no recent fever/chills/night sweats.





History





- Past Medical History


Cardiovascular: reports: Hypertension


Respiratory: reports: None


Neuro: reports: None


Endocrine/Autoimmune: reports: None


GI: reports: Other (hx small bowel resection for lymphoma)


: reports: None


HEENT: reports: None


Psych: reports: Depression


Musculoskeletal: reports: None


Derm: reports: None


MRSA Hx?: No





- Past Surgical History


General: reports: Bowel surgery (small bowel resection; right colectomy), 

Colonoscopy





- Family & Social History


Living arrangement: At home


Living Situation: With family


Social History Notes: Patient lives with his daughter and her . He is 

independent at home and still drives. He is  and has two daughters with 

multiple grandchildren and great-grandchildren. 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





- Substance History


Use: Uses substance without health or social issues: Tobacco (x 65 yrs; quit 2 

weeks ago.), Cannabis (2-3 times per day)





- POLST


Patient has POLST: No


POLST Status: DNR





Meds/Allgy





- Home Medications


Home Medications: 


                                Ambulatory Orders











 Medication  Instructions  Recorded  Confirmed


 


Aspirin 162 mg PO DAILY 07/10/13 02/15/19


 


Acetaminophen/Diphenhydramine 1 each PO QPM PRN 02/08/19 02/15/19





[Tylenol Pm Ex-Strength Caplet]   


 


Calcium Carbonate [Tums (Calcium 500 mg PO BID PRN  tablet 02/11/19 02/15/19





Carbonate 500mg)]   


 


Famotidine [Pepcid] 20 mg PO BID  tablet 02/11/19 02/15/19


 


Ondansetron HCl [Zofran] 4 mg PO Q8HR #10 tablet 02/11/19 02/15/19


 


Polyethylene Glycol 3350 [Miralax] 17 gm PO DAILY #7 packet 02/11/19 02/15/19


 


amLODIPine [Norvasc] 5 mg PO DAILY #30 tablet 02/20/19 














- Allergies


Allergies/Adverse Reactions: 


                                    Allergies











Allergy/AdvReac Type Severity Reaction Status Date / Time


 


No Known Drug Allergies Allergy   Verified 02/15/19 00:18














Review of Systems





- Constitutional


Constitutional: reports: Weight loss.  denies: Fever, Chills, Night sweats





- Gastrointestinal


Gastrointestinal: reports: Abdominal pain, Poor appetite.  denies: Constipation,

 Diarrhea, Change in bowel habits, Rectal bleeding, Black stools, Bloody stools,

 Nausea, Vomiting, Bile emesis, Elijah blood emesis, Coffee grounds emesis, 

Reflux/heartburn





Exam





- Vital Signs


Vital Signs: 





                                Vital Signs x48h











  Temp Pulse Pulse Resp BP BP Pulse Ox


 


 02/25/19 17:00    92  13   95/67  100


 


 02/25/19 16:55    92  18   109/74  100


 


 02/25/19 16:50    85  11 L   122/68  99


 


 02/25/19 16:45    90  16   105/73  100


 


 02/25/19 16:40    83  14   108/70  100


 


 02/25/19 16:35    88  18   97/68  99


 


 02/25/19 16:30    86  11 L   107/71  96


 


 02/25/19 16:25    82  12   118/67  99


 


 02/25/19 16:20    83  9 L   99/71  98


 


 02/25/19 16:15    79  10 L   101/66  100


 


 02/25/19 16:10    82  8 L   101/70  99


 


 02/25/19 16:05    77  7 L   108/71  98


 


 02/25/19 16:00    82  8 L   99/67  100


 


 02/25/19 15:55    74  6 L   104/74  98


 


 02/25/19 15:50    78  7 L   102/71  100


 


 02/25/19 15:45    81  7 L   102/71  97


 


 02/25/19 15:40    80  8 L   97/68  100


 


 02/25/19 15:35       100/67 


 


 02/25/19 15:30    81  8 L   82/62 L  93


 


 02/25/19 14:46  36.7 C   88  10 L   94/65  100


 


 02/25/19 14:35   89   14  103/73   98


 


 02/25/19 14:10   89   16  103/72   100


 


 02/25/19 13:42   85   13  120/72   100


 


 02/25/19 13:37   78   13  121/74   100


 


 02/25/19 13:33   82   12  107/65   100


 


 02/25/19 13:24   85   13  96/65   100


 


 02/25/19 13:12   95   13  81/63 L   98


 


 02/25/19 12:23   88   12  94/76   100


 


 02/25/19 11:56   90   14  114/72   97


 


 02/25/19 11:14   78   16  94/62   98


 


 02/25/19 10:41   86   14  109/73   100


 


 02/25/19 10:39   93   22  82/54 L   99


 


 02/25/19 10:17  36.1 C L  94   33 H  80/59 L  


 


 02/25/19 10:05   96   18  91/57 L   99














- Physical Exam


General Appearance: positive: Alert, Severe distress


Eyes Bilateral: positive: Conjunctivae nml, No scleral icterus


ENT: positive: Dry mucous membranes.  negative: Oral lesions


Neck: positive: No JVD, Trachea midline.  negative: Lymphadenopathy (R), 

Lymphadenopathy (L)


Respiratory: positive: Chest non-tender, No respiratory distress, Breath sounds 

nml.  negative: Wheezes, Rales, Rhonchi


Cardiovascular: positive: Regular rate & rhythm, No murmur, No gallop


Abdomen: positive: Tenderness (diffuse abd tenderness, guarding, rebound), 

Guarding, Rebound, Abnml bowel sounds (hypoactive), Other (healing midline 

incision without evidence of infection).  negative: No distention (mildly 

distended), Hepatomegaly, Splenomegaly, Mass


Skin: positive: Warm


Extremities: positive: Non-tender, Nml appearance, No pedal edema.  negative: 

Calf tenderness


Neurologic/Psychiatric: positive: Oriented x3





Conclusion/Plan





- Diagnosis


Diagnosis: Acute abdomen, most likely due to anastomotic leak, with diffuse 

peritonitis and sepsis, with evolving septic shock.





- Plan


Plan: 





Admit to the ICU, hospitalist consultation, IVF, antibiotics, pressor support as

 necessary. I had a long discussion with the patient and his family and they 

wish to undergo surgery if there is any chance of survival. They are aware of 

the extremely high likelihood of patient's not surviving this illness and wish 

to proceed nevertheless. Will proceed with surgery today when pt appears stable 

and hydrated.





- Lab Results


Lab results reviewed: Yes


Fish Bones: 


                                 02/25/19 10:59





                                 02/25/19 10:59


Other Lab Results: 





lactate 3.3





- Diagnostic Imaging Results


Diagnostic Imaging Results: positive: Final report reviewed, Read 

contemporaneously


Diagnostic Imaging Results Comments: 





CT abd/pelvis shows free air and free fluid in abdominal cavity, c/w anastomotic

 leak.

## 2019-02-25 NOTE — ANESTHESIA PROCEDURE NOTE
Anesth Central Line Template





- Central Line


Central Line Preparation: Consent Obtained


Central line location: Left IJ


Central line type: Triple lumen


Central line catheter tip site resides: Superior vena cava (SVC)


Central line aftercare: Secured, Placement confirmed, No pneumothorax, No 

complications, Pt tolerated well

## 2019-02-25 NOTE — XRAY REPORT
Reason:  post centeral line placement

Procedure Date:  02/25/2019   

Accession Number:  [id number] / [id number]                    

Procedure:  XR  - Chest for Line Placement CPT Code:  

 

FULL RESULT:

 

 

EXAM:

CHEST RADIOGRAPHY

 

EXAM DATE: 2/25/2019 02:19 PM.

 

CLINICAL HISTORY: Post centeral line placement.

 

COMPARISON: CHEST FOR LINE PLACEMENT 02/15/2019 9:35 PM.

 

TECHNIQUE: 1 view.

 

FINDINGS:

Lungs/Pleura: The right lung base opacity is now more prominent. 

Interstitial markings are increased. No pneumothorax or pleural effusion 

is seen.

 

Mediastinum: Tortuous partially calcified aorta and mild borderline 

cardiomegaly are unchanged.

 

Other: Interval placement of a left IJ approach central venous line 

terminating in the mid SVC.

 

IMPRESSION: Right basilar opacities and appropriate line placement.

 

RADIA

## 2019-02-26 LAB
ALBUMIN DIAFP-MCNC: 1.3 G/DL (ref 3.2–5.5)
ALBUMIN/GLOB SERPL: 0.5 {RATIO} (ref 1–2.2)
ALP SERPL-CCNC: 40 IU/L (ref 42–121)
ALT SERPL W P-5'-P-CCNC: 11 IU/L (ref 10–60)
ANION GAP SERPL CALCULATED.4IONS-SCNC: 7 MMOL/L (ref 6–13)
ARTERIAL PATENCY WRIST A: (no result)
AST SERPL W P-5'-P-CCNC: 14 IU/L (ref 10–42)
BASE EXCESS BLDMV CALC-SCNC: 0.3 MMOL/L (ref -2–3)
BASOPHILS NFR BLD AUTO: 0 10^3/UL (ref 0–0.1)
BASOPHILS NFR BLD AUTO: 0.1 %
BILIRUB BLD-MCNC: 0.6 MG/DL (ref 0.2–1)
BUN SERPL-MCNC: 20 MG/DL (ref 6–20)
CALCIUM UR-MCNC: 6.9 MG/DL (ref 8.5–10.3)
CHLORIDE SERPL-SCNC: 110 MMOL/L (ref 101–111)
CO2 BLDA CALC-SCNC: 24.4 MMOL/L (ref 21–29)
CO2 SERPL-SCNC: 23 MMOL/L (ref 21–32)
CREAT SERPLBLD-SCNC: 1 MG/DL (ref 0.6–1.2)
DEPRECATED HCO3 PLAS-SCNC: 23.4 MMOL/L (ref 22–26)
EOSINOPHIL # BLD AUTO: 0 10^3/UL (ref 0–0.7)
EOSINOPHIL NFR BLD AUTO: 0 %
ERYTHROCYTE [DISTWIDTH] IN BLOOD BY AUTOMATED COUNT: 14.2 % (ref 12–15)
FIO2: 40
GFRSERPLBLD MDRD-ARVRAT: 72 ML/MIN/{1.73_M2} (ref 89–?)
GLOBULIN SER-MCNC: 2.5 G/DL (ref 2.1–4.2)
GLUCOSE SERPL-MCNC: 102 MG/DL (ref 70–100)
HGB UR QL STRIP: 9.1 G/DL (ref 14–18)
LYMPHOCYTES # SPEC AUTO: 0.6 10^3/UL (ref 1.5–3.5)
LYMPHOCYTES NFR BLD AUTO: 4.1 %
MAGNESIUM SERPL-MCNC: 1.7 MG/DL (ref 1.7–2.8)
MCH RBC QN AUTO: 31.7 PG (ref 27–31)
MCHC RBC AUTO-ENTMCNC: 33.8 G/DL (ref 32–36)
MCV RBC AUTO: 93.9 FL (ref 80–94)
MONOCYTES # BLD AUTO: 0.7 10^3/UL (ref 0–1)
MONOCYTES NFR BLD AUTO: 4.9 %
NEUTROPHILS # BLD AUTO: 12.4 10^3/UL (ref 1.5–6.6)
NEUTROPHILS # SNV AUTO: 13.6 X10^3/UL (ref 4.8–10.8)
NEUTROPHILS NFR BLD AUTO: 90.9 %
PCO2 TEMP ADJ BLDCOA: 32 MMHG (ref 34–45)
PDW BLD AUTO: 7.5 FL (ref 7.4–11.4)
PH TEMP ADJ BLDA: 7.48 [PH] (ref 7.35–7.45)
PHOSPHATE BLD-MCNC: 3.8 MG/DL (ref 2.5–4.6)
PLATELET # BLD: 375 10^3/UL (ref 130–450)
PO2 TEMP ADJ BLDCOA: 107 MMHG (ref 80–100)
PROT SPEC-MCNC: 3.8 G/DL (ref 6.7–8.2)
RBC MAR: 2.86 10^6/UL (ref 4.7–6.1)
SAO2 % BLDA FROM PO2: 97 % (ref 94–98)
SODIUM SERPLBLD-SCNC: 140 MMOL/L (ref 135–145)

## 2019-02-26 RX ADMIN — SODIUM CHLORIDE SCH MLS/HR: 9 INJECTION, SOLUTION INTRAVENOUS at 15:30

## 2019-02-26 RX ADMIN — SODIUM CHLORIDE SCH MLS/HR: 9 INJECTION, SOLUTION INTRAVENOUS at 10:31

## 2019-02-26 RX ADMIN — SODIUM CHLORIDE SCH MG: 9 INJECTION, SOLUTION INTRAVENOUS at 20:16

## 2019-02-26 RX ADMIN — SODIUM CHLORIDE SCH MLS/HR: 900 INJECTION INTRAVENOUS at 03:59

## 2019-02-26 RX ADMIN — SODIUM CHLORIDE SCH MG: 9 INJECTION, SOLUTION INTRAVENOUS at 08:35

## 2019-02-26 RX ADMIN — SODIUM CHLORIDE, PRESERVATIVE FREE SCH ML: 5 INJECTION INTRAVENOUS at 18:56

## 2019-02-26 RX ADMIN — ACETAMINOPHEN PRN MLS/HR: 10 INJECTION, SOLUTION INTRAVENOUS at 17:43

## 2019-02-26 RX ADMIN — ENOXAPARIN SODIUM SCH MG: 100 INJECTION SUBCUTANEOUS at 09:31

## 2019-02-26 RX ADMIN — ACETAMINOPHEN PRN MLS/HR: 10 INJECTION, SOLUTION INTRAVENOUS at 09:29

## 2019-02-26 RX ADMIN — SODIUM CHLORIDE SCH MLS/HR: 900 INJECTION INTRAVENOUS at 12:33

## 2019-02-26 RX ADMIN — HYDROMORPHONE HYDROCHLORIDE PRN MG: 1 INJECTION, SOLUTION INTRAMUSCULAR; INTRAVENOUS; SUBCUTANEOUS at 20:07

## 2019-02-26 RX ADMIN — SODIUM CHLORIDE, PRESERVATIVE FREE SCH ML: 5 INJECTION INTRAVENOUS at 02:05

## 2019-02-26 RX ADMIN — SODIUM CHLORIDE, PRESERVATIVE FREE PRN ML: 5 INJECTION INTRAVENOUS at 19:45

## 2019-02-26 RX ADMIN — SODIUM CHLORIDE SCH MLS/HR: 9 INJECTION, SOLUTION INTRAVENOUS at 02:05

## 2019-02-26 RX ADMIN — SODIUM CHLORIDE SCH MG: 9 INJECTION, SOLUTION INTRAVENOUS at 02:44

## 2019-02-26 RX ADMIN — I.V. FAT EMULSION SCH MLS/HR: 20 EMULSION INTRAVENOUS at 18:56

## 2019-02-26 RX ADMIN — SODIUM CHLORIDE, PRESERVATIVE FREE SCH ML: 5 INJECTION INTRAVENOUS at 08:40

## 2019-02-26 RX ADMIN — HYDROMORPHONE HYDROCHLORIDE PRN MG: 1 INJECTION, SOLUTION INTRAMUSCULAR; INTRAVENOUS; SUBCUTANEOUS at 13:50

## 2019-02-26 RX ADMIN — SODIUM CHLORIDE SCH MLS/HR: 900 INJECTION INTRAVENOUS at 19:44

## 2019-02-26 NOTE — PROVIDER PROGRESS NOTE
Subjective





- Prog Note Date


Prog Note Date: 02/26/19


Prog Note Time: 07:56





- Subjective


Pt reports feeling: Improved (Abd pain improved but c/o pain right leg/foot)





Objective





- Vital Signs/Intake & Output


Reviewed Vital Signs: Yes


Vital Signs: 





                                   Vital Signs











  Temp Pulse Pulse Resp BP BP Pulse Ox


 


 02/26/19 07:31   82     


 


 02/26/19 07:00    71  25 H  99/51 L  71/61 L  98


 


 02/26/19 06:00    70  15  110/51 L  68/57 L  100


 


 02/26/19 05:09   71     


 


 02/26/19 05:00    75  18  99/49 L  72/60 L  100


 


 02/26/19 04:00  37.3 C   70  16  98/50 L  71/62 L  100











Intake & Output: 





                                 Intake & Output











 02/23/19 02/24/19 02/25/19 02/26/19





 23:59 23:59 23:59 23:59


 


Intake Total   2300 2853.583


 


Output Total   119 464


 


Balance   2181 2389.583














- Objective


General Appearance: positive: Alert, Other (intubated; appears comfortable)


Eyes Bilateral: positive: No scleral icterus


ENT: positive: Dry mucous membranes


Neck: positive: No JVD, Trachea midline.  negative: Lymphadenopathy (R), 

Lymphadenopathy (L)


Respiratory: positive: Chest non-tender, No respiratory distress, Breath sounds 

nml


Cardiovascular: positive: Regular rate & rhythm


Abdomen: positive: Tenderness (minimal expected postop), Abnml bowel sounds 

(hypoactive), Other (ileostomy pink, viable, edematous; no output)


Skin: positive: Cyanosis (left 3rd toe), Pallor


Extremities: positive: Pedal edema, Other (toes pale, one cyanotic).  negative: 

Calf tenderness


Neurologic/Psychiatric: positive: Motor nml, Sensation nml





- Lab Results


Fish Bones: 


                                 02/26/19 05:05





                                 02/26/19 05:05


Other Labs: 





                               Lab Results x24hrs











  02/26/19 02/26/19 02/26/19 Range/Units





  05:15 05:05 05:05 


 


WBC    13.6 H  (4.8-10.8)  x10^3/uL


 


RBC    2.86 L  (4.70-6.10)  10^6/uL


 


Hgb    9.1 L  (14.0-18.0)  g/dL


 


Hct    26.9 L  (42.0-52.0)  %


 


MCV    93.9  (80.0-94.0)  fL


 


MCH    31.7 H  (27.0-31.0)  pg


 


MCHC    33.8  (32.0-36.0)  g/dL


 


RDW    14.2  (12.0-15.0)  %


 


Plt Count    375  (130-450)  10^3/uL


 


MPV    7.5  (7.4-11.4)  fL


 


Neut # (Auto)    12.4 H  


 


Lymph # (Auto)    0.6 L  


 


Mono # (Auto)    0.7  


 


Eos # (Auto)    0.0  


 


Baso # (Auto)    0.0  


 


Absolute Nucleated RBC    0.00  


 


Total Counted     


 


Band Neuts % (Manual)    (0 - 10) %


 


Abnorm Lymph % (Manual)     %


 


Nucleated RBC %    0.0  


 


Neutrophils # (Manual)     (1.5-6.6)  10^3/uL


 


Lymphocytes # (Manual)     (1.5-3.5)  10^3/uL


 


Monocytes # (Manual)     (0.0-1.0)  10^3/uL


 


Eosinophils # (Manual)     (0-0.7)  10^3/uL


 


Basophils # (Manual)     (0-0.1)  10^3/uL


 


Differential Comment     


 


Platelet Estimate     (NORMAL)  


 


Platelet Morphology     (NORMAL)  


 


RBC Morph Micro Appear     (NORMAL)  


 


Bld Gas Analysis Time  0526    


 


Sample Site  A-LINE    


 


ABG pH  7.48 H    (7.35-7.45)  


 


ABG pCO2  32 L    (34-45)  mmHg


 


ABG pO2  107 H    ()  mmHg


 


ABG HCO3  23.4    (22.0-26.0)  mmol/L


 


ABG Total CO2  24.4    (21.0-29.0)  MMOL/L


 


ABG O2 Saturation  97    (94-98)  %


 


ABG Base Excess  0.3    (-2.0-3.0)  mmol/L


 


Shaq Test  NOT APPLICABLE    


 


Respiration Rate  8    b/min


 


O2 Delivery Device  VENTILATOR    


 


Vent Mode  SIMV    


 


FiO2  40.00    


 


Tidal Volume  600    mL


 


PEEP  5    cmH2O


 


Pressure Support Vent  10    cmH2O


 


Sodium   140   (135-145)  mmol/L


 


Potassium   4.0   (3.5-5.0)  mmol/L


 


Chloride   110   (101-111)  mmol/L


 


Carbon Dioxide   23   (21-32)  mmol/L


 


Anion Gap   7.0   (6-13)  


 


BUN   20   (6-20)  mg/dL


 


Creatinine   1.0   (0.6-1.2)  mg/dL


 


Estimated GFR (MDRD)   72 L   (>89)  


 


Glucose   102 H   ()  mg/dL


 


Lactic Acid     (0.5-2.2)  mmol/L


 


Calcium   6.9 L   (8.5-10.3)  mg/dL


 


Phosphorus   3.8   (2.5-4.6)  mg/dL


 


Magnesium   1.7   (1.7-2.8)  mg/dL


 


Total Bilirubin   0.6   (0.2-1.0)  mg/dL


 


AST   14   (10-42)  IU/L


 


ALT   11   (10-60)  IU/L


 


Alkaline Phosphatase   40 L   ()  IU/L


 


Total Protein   3.8 L   (6.7-8.2)  g/dL


 


Albumin   1.3 L   (3.2-5.5)  g/dL


 


Globulin   2.5   (2.1-4.2)  g/dL


 


Albumin/Globulin Ratio   0.5 L   (1.0-2.2)  


 


Lipase     (22-51)  U/L


 


Urine Color     


 


Urine Clarity     (CLEAR)  


 


Urine pH     (5.0-7.5)  PH


 


Ur Specific Gravity     (1.002-1.030)  


 


Urine Protein     (NEGATIVE)  mg/dL


 


Urine Glucose (UA)     (NEGATIVE)  mg/dL


 


Urine Ketones     (NEGATIVE)  mg/dL


 


Urine Occult Blood     (NEGATIVE)  


 


Urine Nitrite     (NEGATIVE)  


 


Urine Bilirubin     (NEGATIVE)  


 


Urine Urobilinogen     (NORMAL)  E.U./dL


 


Ur Leukocyte Esterase     (NEGATIVE)  


 


Urine RBC     (0-5)  /HPF


 


Urine WBC     (0-3)  /HPF


 


Ur Epithelial Cells     (<= Few)  /HPF


 


Ur Squamous Epith Cells     (<= Few)  


 


Urine Bacteria     (None Seen)  /HPF


 


Ur Microscopic Review     


 


Urine Culture Comments     


 


MRSA Surveill Initial     (NEGATIVE)  


 


Blood Type     


 


Antibody Screen     














  02/25/19 02/25/19 02/25/19 Range/Units





  21:10 21:00 16:35 


 


WBC     (4.8-10.8)  x10^3/uL


 


RBC     (4.70-6.10)  10^6/uL


 


Hgb     (14.0-18.0)  g/dL


 


Hct     (42.0-52.0)  %


 


MCV     (80.0-94.0)  fL


 


MCH     (27.0-31.0)  pg


 


MCHC     (32.0-36.0)  g/dL


 


RDW     (12.0-15.0)  %


 


Plt Count     (130-450)  10^3/uL


 


MPV     (7.4-11.4)  fL


 


Neut # (Auto)     


 


Lymph # (Auto)     


 


Mono # (Auto)     


 


Eos # (Auto)     


 


Baso # (Auto)     


 


Absolute Nucleated RBC     


 


Total Counted     


 


Band Neuts % (Manual)    (0 - 10) %


 


Abnorm Lymph % (Manual)     %


 


Nucleated RBC %     


 


Neutrophils # (Manual)     (1.5-6.6)  10^3/uL


 


Lymphocytes # (Manual)     (1.5-3.5)  10^3/uL


 


Monocytes # (Manual)     (0.0-1.0)  10^3/uL


 


Eosinophils # (Manual)     (0-0.7)  10^3/uL


 


Basophils # (Manual)     (0-0.1)  10^3/uL


 


Differential Comment     


 


Platelet Estimate     (NORMAL)  


 


Platelet Morphology     (NORMAL)  


 


RBC Morph Micro Appear     (NORMAL)  


 


Bld Gas Analysis Time   2112   


 


Sample Site   A-LINE   


 


ABG pH   7.32 L   (7.35-7.45)  


 


ABG pCO2   31 L   (34-45)  mmHg


 


ABG pO2   166 H*   ()  mmHg


 


ABG HCO3   16.2 L   (22.0-26.0)  mmol/L


 


ABG Total CO2   17.0 L   (21.0-29.0)  MMOL/L


 


ABG O2 Saturation   99 H   (94-98)  %


 


ABG Base Excess   -10.0 L   (-2.0-3.0)  mmol/L


 


Shaq Test   NOT APPLICABLE   


 


Respiration Rate   8   b/min


 


O2 Delivery Device   VENTILATOR   


 


Vent Mode   SIMV   


 


FiO2   85.00   


 


Tidal Volume   600   mL


 


PEEP   5   cmH2O


 


Pressure Support Vent   10   cmH2O


 


Sodium     (135-145)  mmol/L


 


Potassium     (3.5-5.0)  mmol/L


 


Chloride     (101-111)  mmol/L


 


Carbon Dioxide     (21-32)  mmol/L


 


Anion Gap     (6-13)  


 


BUN     (6-20)  mg/dL


 


Creatinine     (0.6-1.2)  mg/dL


 


Estimated GFR (MDRD)     (>89)  


 


Glucose     ()  mg/dL


 


Lactic Acid    1.4  (0.5-2.2)  mmol/L


 


Calcium     (8.5-10.3)  mg/dL


 


Phosphorus     (2.5-4.6)  mg/dL


 


Magnesium     (1.7-2.8)  mg/dL


 


Total Bilirubin     (0.2-1.0)  mg/dL


 


AST     (10-42)  IU/L


 


ALT     (10-60)  IU/L


 


Alkaline Phosphatase     ()  IU/L


 


Total Protein     (6.7-8.2)  g/dL


 


Albumin     (3.2-5.5)  g/dL


 


Globulin     (2.1-4.2)  g/dL


 


Albumin/Globulin Ratio     (1.0-2.2)  


 


Lipase     (22-51)  U/L


 


Urine Color  YELLOW    


 


Urine Clarity  CLEAR    (CLEAR)  


 


Urine pH  5.0    (5.0-7.5)  PH


 


Ur Specific Gravity  1.015    (1.002-1.030)  


 


Urine Protein  30 H    (NEGATIVE)  mg/dL


 


Urine Glucose (UA)  NEGATIVE    (NEGATIVE)  mg/dL


 


Urine Ketones  TRACE    (NEGATIVE)  mg/dL


 


Urine Occult Blood  TRACE-LYSE    (NEGATIVE)  


 


Urine Nitrite  NEGATIVE    (NEGATIVE)  


 


Urine Bilirubin  NEGATIVE    (NEGATIVE)  


 


Urine Urobilinogen  0.2 (NORMAL)    (NORMAL)  E.U./dL


 


Ur Leukocyte Esterase  NEGATIVE    (NEGATIVE)  


 


Urine RBC  0-5    (0-5)  /HPF


 


Urine WBC  0-3    (0-3)  /HPF


 


Ur Epithelial Cells  FEW Transitional    (<= Few)  /HPF


 


Ur Squamous Epith Cells  NONE SEEN    (<= Few)  


 


Urine Bacteria  None Seen    (None Seen)  /HPF


 


Ur Microscopic Review  INDICATED    


 


Urine Culture Comments  NOT INDICATED    


 


MRSA Surveill Initial     (NEGATIVE)  


 


Blood Type     


 


Antibody Screen     














  02/25/19 02/25/19 02/25/19 Range/Units





  15:57 14:43 10:59 


 


WBC     (4.8-10.8)  x10^3/uL


 


RBC     (4.70-6.10)  10^6/uL


 


Hgb     (14.0-18.0)  g/dL


 


Hct     (42.0-52.0)  %


 


MCV     (80.0-94.0)  fL


 


MCH     (27.0-31.0)  pg


 


MCHC     (32.0-36.0)  g/dL


 


RDW     (12.0-15.0)  %


 


Plt Count     (130-450)  10^3/uL


 


MPV     (7.4-11.4)  fL


 


Neut # (Auto)     


 


Lymph # (Auto)     


 


Mono # (Auto)     


 


Eos # (Auto)     


 


Baso # (Auto)     


 


Absolute Nucleated RBC     


 


Total Counted     


 


Band Neuts % (Manual)    (0 - 10) %


 


Abnorm Lymph % (Manual)     %


 


Nucleated RBC %     


 


Neutrophils # (Manual)     (1.5-6.6)  10^3/uL


 


Lymphocytes # (Manual)     (1.5-3.5)  10^3/uL


 


Monocytes # (Manual)     (0.0-1.0)  10^3/uL


 


Eosinophils # (Manual)     (0-0.7)  10^3/uL


 


Basophils # (Manual)     (0-0.1)  10^3/uL


 


Differential Comment     


 


Platelet Estimate     (NORMAL)  


 


Platelet Morphology     (NORMAL)  


 


RBC Morph Micro Appear     (NORMAL)  


 


Bld Gas Analysis Time     


 


Sample Site     


 


ABG pH     (7.35-7.45)  


 


ABG pCO2     (34-45)  mmHg


 


ABG pO2     ()  mmHg


 


ABG HCO3     (22.0-26.0)  mmol/L


 


ABG Total CO2     (21.0-29.0)  MMOL/L


 


ABG O2 Saturation     (94-98)  %


 


ABG Base Excess     (-2.0-3.0)  mmol/L


 


Shaq Test     


 


Respiration Rate     b/min


 


O2 Delivery Device     


 


Vent Mode     


 


FiO2     


 


Tidal Volume     mL


 


PEEP     cmH2O


 


Pressure Support Vent     cmH2O


 


Sodium     (135-145)  mmol/L


 


Potassium     (3.5-5.0)  mmol/L


 


Chloride     (101-111)  mmol/L


 


Carbon Dioxide     (21-32)  mmol/L


 


Anion Gap     (6-13)  


 


BUN     (6-20)  mg/dL


 


Creatinine     (0.6-1.2)  mg/dL


 


Estimated GFR (MDRD)     (>89)  


 


Glucose     ()  mg/dL


 


Lactic Acid    3.3 H*  (0.5-2.2)  mmol/L


 


Calcium     (8.5-10.3)  mg/dL


 


Phosphorus     (2.5-4.6)  mg/dL


 


Magnesium     (1.7-2.8)  mg/dL


 


Total Bilirubin     (0.2-1.0)  mg/dL


 


AST     (10-42)  IU/L


 


ALT     (10-60)  IU/L


 


Alkaline Phosphatase     ()  IU/L


 


Total Protein     (6.7-8.2)  g/dL


 


Albumin     (3.2-5.5)  g/dL


 


Globulin     (2.1-4.2)  g/dL


 


Albumin/Globulin Ratio     (1.0-2.2)  


 


Lipase     (22-51)  U/L


 


Urine Color     


 


Urine Clarity     (CLEAR)  


 


Urine pH     (5.0-7.5)  PH


 


Ur Specific Gravity     (1.002-1.030)  


 


Urine Protein     (NEGATIVE)  mg/dL


 


Urine Glucose (UA)     (NEGATIVE)  mg/dL


 


Urine Ketones     (NEGATIVE)  mg/dL


 


Urine Occult Blood     (NEGATIVE)  


 


Urine Nitrite     (NEGATIVE)  


 


Urine Bilirubin     (NEGATIVE)  


 


Urine Urobilinogen     (NORMAL)  E.U./dL


 


Ur Leukocyte Esterase     (NEGATIVE)  


 


Urine RBC     (0-5)  /HPF


 


Urine WBC     (0-3)  /HPF


 


Ur Epithelial Cells     (<= Few)  /HPF


 


Ur Squamous Epith Cells     (<= Few)  


 


Urine Bacteria     (None Seen)  /HPF


 


Ur Microscopic Review     


 


Urine Culture Comments     


 


MRSA Surveill Initial   NEGATIVE   (NEGATIVE)  


 


Blood Type  O POSITIVE    


 


Antibody Screen  NEGATIVE    














  02/25/19 02/25/19 Range/Units





  10:59 10:59 


 


WBC   12.5 H  (4.8-10.8)  x10^3/uL


 


RBC   3.65 L  (4.70-6.10)  10^6/uL


 


Hgb   11.2 L  (14.0-18.0)  g/dL


 


Hct   33.7 L  (42.0-52.0)  %


 


MCV   92.3  (80.0-94.0)  fL


 


MCH   30.7  (27.0-31.0)  pg


 


MCHC   33.3  (32.0-36.0)  g/dL


 


RDW   14.0  (12.0-15.0)  %


 


Plt Count   517 H  (130-450)  10^3/uL


 


MPV   7.3 L  (7.4-11.4)  fL


 


Neut # (Auto)   Not Reportable  


 


Lymph # (Auto)   Not Reportable  


 


Mono # (Auto)   Not Reportable  


 


Eos # (Auto)   Not Reportable  


 


Baso # (Auto)   Not Reportable  


 


Absolute Nucleated RBC   Not Reportable  


 


Total Counted   100  


 


Band Neuts % (Manual)   7 (0 - 10) %


 


Abnorm Lymph % (Manual)   0  %


 


Nucleated RBC %   Not Reportable  


 


Neutrophils # (Manual)   11.3 H  (1.5-6.6)  10^3/uL


 


Lymphocytes # (Manual)   0.5 L  (1.5-3.5)  10^3/uL


 


Monocytes # (Manual)   0.8  (0.0-1.0)  10^3/uL


 


Eosinophils # (Manual)   0.0  (0-0.7)  10^3/uL


 


Basophils # (Manual)   0.0  (0-0.1)  10^3/uL


 


Differential Comment   MANUAL DIFFERENTIAL  


 


Platelet Estimate   INCREASED (>450,000)  (NORMAL)  


 


Platelet Morphology   NORMAL APPEARANCE  (NORMAL)  


 


RBC Morph Micro Appear   NORMAL APPEARANCE  (NORMAL)  


 


Bld Gas Analysis Time    


 


Sample Site    


 


ABG pH    (7.35-7.45)  


 


ABG pCO2    (34-45)  mmHg


 


ABG pO2    ()  mmHg


 


ABG HCO3    (22.0-26.0)  mmol/L


 


ABG Total CO2    (21.0-29.0)  MMOL/L


 


ABG O2 Saturation    (94-98)  %


 


ABG Base Excess    (-2.0-3.0)  mmol/L


 


Shaq Test    


 


Respiration Rate    b/min


 


O2 Delivery Device    


 


Vent Mode    


 


FiO2    


 


Tidal Volume    mL


 


PEEP    cmH2O


 


Pressure Support Vent    cmH2O


 


Sodium  138   (135-145)  mmol/L


 


Potassium  3.3 L   (3.5-5.0)  mmol/L


 


Chloride  102   (101-111)  mmol/L


 


Carbon Dioxide  26   (21-32)  mmol/L


 


Anion Gap  10.0   (6-13)  


 


BUN  14   (6-20)  mg/dL


 


Creatinine  1.0   (0.6-1.2)  mg/dL


 


Estimated GFR (MDRD)  72 L   (>89)  


 


Glucose  122 H   ()  mg/dL


 


Lactic Acid    (0.5-2.2)  mmol/L


 


Calcium  8.0 L   (8.5-10.3)  mg/dL


 


Phosphorus    (2.5-4.6)  mg/dL


 


Magnesium  2.1   (1.7-2.8)  mg/dL


 


Total Bilirubin  0.7   (0.2-1.0)  mg/dL


 


AST  19   (10-42)  IU/L


 


ALT  12   (10-60)  IU/L


 


Alkaline Phosphatase  47   ()  IU/L


 


Total Protein  4.8 L   (6.7-8.2)  g/dL


 


Albumin  1.8 L   (3.2-5.5)  g/dL


 


Globulin  3.0   (2.1-4.2)  g/dL


 


Albumin/Globulin Ratio  0.6 L   (1.0-2.2)  


 


Lipase  22   (22-51)  U/L


 


Urine Color    


 


Urine Clarity    (CLEAR)  


 


Urine pH    (5.0-7.5)  PH


 


Ur Specific Gravity    (1.002-1.030)  


 


Urine Protein    (NEGATIVE)  mg/dL


 


Urine Glucose (UA)    (NEGATIVE)  mg/dL


 


Urine Ketones    (NEGATIVE)  mg/dL


 


Urine Occult Blood    (NEGATIVE)  


 


Urine Nitrite    (NEGATIVE)  


 


Urine Bilirubin    (NEGATIVE)  


 


Urine Urobilinogen    (NORMAL)  E.U./dL


 


Ur Leukocyte Esterase    (NEGATIVE)  


 


Urine RBC    (0-5)  /HPF


 


Urine WBC    (0-3)  /HPF


 


Ur Epithelial Cells    (<= Few)  /HPF


 


Ur Squamous Epith Cells    (<= Few)  


 


Urine Bacteria    (None Seen)  /HPF


 


Ur Microscopic Review    


 


Urine Culture Comments    


 


MRSA Surveill Initial    (NEGATIVE)  


 


Blood Type    


 


Antibody Screen    














Sepsis Event Note (H)





- Evaluation


Current Stage of Sepsis: Septic shock


Possible source of Sepsis: positive: GI tract/intra-abdominal


Confirmed Source and Organism (if known) of Sepsis: 





anastomotic leak





- Sepsis Criteria


Sepsis Criteria: Recorded Heart Rate greater than 90 bpm, MAP less than 65 mmHg,

SBP less than 90 mmHg, Metabolic: lactate > 2 mmol/L





Assessment/Plan





- Problem List


(1) Perforated abdominal viscus


Impression: 


PO Day 1; with ongoing septic shock, requiring pressor and ventilatory support, 

but alert, comfortable, with satisfactory renal function and oxygenation at 

present. Rec: as per hospitalist.








(2) Ischemic leg


Impression: 


likely due to pressors; rec: avoid compression; protect feet and toes, ow per 

hospitalist.

## 2019-02-26 NOTE — OPERATIVE REPORT
DATE OF SERVICE: 02/25/2019

Physician: Niranjan Almeida MD

 

PREOPERATIVE DIAGNOSIS:  Acute abdomen with generalized peritonitis and suspected anastomotic leak.

 

POSTOPERATIVE DIAGNOSIS:  Anastomotic leak with generalized peritonitis.

 

PROCEDURE PERFORMED:  Exploratory laparotomy, resection of ileocolic anastomosis and construction of 
ileostomy with Jarrett's pouch.

 

SURGEON:  Niranjan Almeida MD

 

ANESTHESIA:  General endotracheal by Dirk Bruno MD.

 

ESTIMATED BLOOD LOSS:  50 mL

 

COMPLICATIONS:  None.

 

DRAINS:  None.

 

FINDINGS:  Exploration revealed fibrinopurulent peritonitis with approximately 1-2 liters of seropuru
lent peritoneal fluid, but no gross fecal contamination.  There was a 3 mm diameter perforation in th
e transverse colon immediately adjacent to the staple line.  The anastomosis otherwise appeared intac
t.  The remainder of the small and large bowel, stomach, duodenum and liver appeared within normal li
mits, aside from edema and serositis.

 

INDICATIONS:  Patient is an 83-year-old gentleman who is 10 days status post emergency surgery for a 
perforated cecal carcinoma.  At surgery, he underwent a right colectomy with ileal right transverse a
nastomosis.  He did well until last night when he developed the acute onset of generalized abdominal 
pain and was found to have evidence today of generalized peritonitis with CT findings showing free fl
uid and air consistent with perforation of the GI tract.  He was thought to be suffering from an anas
tomotic leak.  Following resuscitation with intravenous fluids, broad-spectrum parenteral antibiotics
 and pressors and a long discussion with the family who strongly were in favor of proceeding with dilma cook, he was taken to the operating room for the above-mentioned surgical procedures.

 

TECHNIQUE:  After informed consent, the patient was taken to the operating room where he was placed u
nder general endotracheal anesthesia.  Preoperative preparation included resuscitation with intraveno
us fluids, pressors and administration of broad spectrum parenteral antibiotic therapy.  Almeida cathet
er was inserted.  Nasogastric tube was inserted.  His abdomen was prepared with ChloraPrep solution a
nd draped in the usual sterile fashion.  

 

The patient's previous midline incision was reopened, the abdominal cavity explored, and findings wer
e as noted above.  The surgical anastomosis was resected by picking points of bowel division in the t
erminal ileum several centimeters proximal to the anastomosis where the bowel was mobilized circumfer
entially and ligated and divided with the 75 mm linear cutting stapling device with a GI load.  A sim
ilar technique was used to divide the transverse colon several centimeters distal to the anastomosis 
and the point of perforation.  The mesentery between the two points of bowel division was then ligate
d and divided with the LigaSure device.  Care was taken to avoid injury to any adjacent structures.  
The resected anastomosis, which consisted of a portion of the ileum and transverse colon, was then se
nt for pathologic evaluation.  The abdominal cavity was copiously irrigated with saline solution cont
aining a gram of cefoxitin per liter.  Approximately 4 liters were used altogether, including 2 liter
s of saline, followed by 2 liters of the antibiotic solution. 

 

The distal transverse colon was left in situ as an extended Jarrett's pouch and the terminal ileum w
as brought through an opening in the mid right abdominal wall as an end ileostomy in the usual fashio
n.  The staple line was excised and the ileostomy was then matured to the dermis of the abdominal wal
l in the usual fashion, a la a Dia ileostomy technique using 3-0 Vicryl sutures.  This was perform
ed after the midline incision had been closed in layers using continuous doubled #1 PDS to close the 
midline fascia above and below the umbilicus, and then to have the skin loosely approximated with ski
n staples.  Dry sterile dressings were applied.  A stoma appliance was applied.  The stoma was seen t
o be viable and had good blood supply.  Anesthesia was terminated and patient transferred to the ICU 
in critical condition:  Sponge and needle counts correct x2.  No drains were used.

 

 

DD: 02/25/2019 19:56

TD: 02/25/2019 20:05

Job #: [id number]

## 2019-02-26 NOTE — XRAY REPORT
Reason:  ET tube placement

Procedure Date:  02/26/2019   

Accession Number:  [id number] / [id number]                    

Procedure:  XR  - Chest 1 View X-Ray CPT Code:  33677

 

FULL RESULT:

 

 

EXAM:

CHEST RADIOGRAPHY

 

EXAM DATE: 2/26/2019 01:23 PM.

 

CLINICAL HISTORY: Interval intubation.

 

COMPARISON: Chest 2 view 02/25/2019 1:58 PM.

 

TECHNIQUE: 1 view.

 

FINDINGS:

Lungs/Pleura: Lung volumes have increased interval with decreased 

interstitial pulmonary markings in keeping with positive pressure 

ventilation. A linear opacity with a dome-shaped low-density opacity 

persists in the right lower lobe, loculated effusion is included in the 

differential in addition to air space disease.

 

Mediastinum: The cardiomediastinal silhouette with tortuous aorta is 

stable.

 

Other: The endotracheal tube terminates below the thoracic inlet and 6 cm 

above the rubi, acceptable position. Central line is stable.

 

IMPRESSION: Interval intubation, acceptable position.

 

RADIA

ADDENDUM: 02/26/19 14:48

 

The enteric tube terminates below the inferior confines of the radiograph.

## 2019-02-26 NOTE — PROVIDER PROGRESS NOTE
Assessment/Plan





- Problem List


(1) Septic shock


Assessment/Plan: 


Lactic acid level has normalized. WBC went up.


BP is still low and requiring pressors iv for support.


Continue treating a GI source, with iv antibiotics.








(2) Perforated abdominal viscus


Assessment/Plan: 


Exploratory GI surgery found stool in the peritoneum.


He is POD #1, still intubated, ng drain in, got anileostomy.


Continue present plan of care.


Possible extubation tomorrow.








(3) Adenocarcinoma, intestinal type


Assessment/Plan: 


He would have been started on chemo, per the surgeon. This is unlikely, since 

his risk of mortality is high.


The family wants everything done, unless it appears to be medically futile.











(4) Anemia


Assessment/Plan: 


Follow CBC, transfuse if needed.








(5) Hypokalemia


Assessment/Plan: 


Resolved with replacement.


Follow BMP daily.








- Current Meds


Current Meds: 





                               Current Medications











Generic Name Dose Route Start Last Admin





  Trade Name Freq  PRN Reason Stop Dose Admin


 


Enoxaparin Sodium  40 mg  02/26/19 09:00  02/26/19 09:31





  Lovenox  SUBQ   40 mg





  DAILY LILLIANA   Administration





     





     





     





     


 


Hydromorphone HCl  1 mg  02/25/19 12:38  02/26/19 13:50





  Dilaudid Inj Carp  IVP   1 mg





  Q2HR PRN   Administration





  Pain 8 to 10   





     





     





     


 


Piperacillin Sod/Tazobactam  100 mls @ 25 mls/hr  02/25/19 20:00  02/26/19 12:33





  Sod 4.5 gm/ Sodium Chloride  IV   25 mls/hr





  Q8H LILLIANA   Administration





     





     





     





     


 


Norepinephrine Bitartrate 8 mg  250 mls @ 15 mls/hr  02/26/19 08:00  02/26/19 

14:49





  / Dextrose  IV   5 mcg/min





  .[id number] LILLIANA   9.38 mls/hr





     Titration





     





  Protocol   





  8 MCG/MIN   


 


Acetaminophen  100 mls @ 400 mls/hr  02/26/19 01:33  02/26/19 09:45





  Ofirmev  IV   Infused





  Q6HR PRN   Infusion





  Pain or T>100.4   





     





     





     


 


Pantoprazole Sodium  40 mg  02/26/19 02:00  02/26/19 08:35





  Protonix  IV   40 mg





  BID LILLIANA   Administration





     





     





     





     


 


Sodium Chloride  10 ml  02/25/19 17:00  02/26/19 08:40





  Normal Saline Flush 0.9%  IVP   40 ml





  0100,0900,1700 [facility]   Administration





     





     





     





     














- Lab Result


Fish Bone Diagrams: 


                                 02/26/19 05:05





                                 02/26/19 05:05





- Additional Planning


My Orders: 





My Active Orders





02/26/19 14:17


Sodium Chloride 0.9% [Normal Saline 0.9%] 1,000 ml IV 75 mls/hr 





02/26/19 14:21


Daily Weight [RC] DAILY 


Nutrition Consult [CONS] Routine 





02/26/19 15:00


Multivitamin [Infuvite] 10 ml Trace Elements V Conc [Multitrace-5 Conc Vial] 1 

ml   TPN (Clinimix E 5/15) [Clinimix E 5%-15% Solution] 2,000 ml IV Q24H 





02/26/19 19:00


Fat Emulsion 20% [Intralipid 20%] 250 ml IV 1900 





02/27/19 05:00


CBC - COMP BLD CT W/AUTO DIFF [HEME] Routine 


COMPREHENSIVE METABOLIC PANEL [CHEM] Routine 


MAGNESIUM [CHEM] Routine 


PHOSPHORUS [CHEM] Routine 


PREALBUMIN [CHEM] Routine 


PT WITH INR [COAG] Routine 


TRIGLYCERIDES [CHEM] Routine 





03/01/19 05:00


COMPREHENSIVE METABOLIC PANEL [CHEM] Routine 


MAGNESIUM [CHEM] Routine 


PHOSPHORUS [CHEM] Routine 


PREALBUMIN [CHEM] Routine 


TRIGLYCERIDES [CHEM] Routine 





03/03/19 05:00


COMPREHENSIVE METABOLIC PANEL [CHEM] Routine 


MAGNESIUM [CHEM] Routine 


PHOSPHORUS [CHEM] Routine 


PREALBUMIN [CHEM] Routine 


TRIGLYCERIDES [CHEM] Routine 





03/06/19 05:00


CBC - COMP BLD CT W/AUTO DIFF [HEME] Routine 


COMPREHENSIVE METABOLIC PANEL [CHEM] Routine 


MAGNESIUM [CHEM] Routine 


PHOSPHORUS [CHEM] Routine 


PREALBUMIN [CHEM] Routine 


PT WITH INR [COAG] Routine 


TRIGLYCERIDES [CHEM] Routine 














Subjective





- Subjective


Patient Reports: Resting Comfortably, Other (Pt told tthe surgeon that he had 

pain in feet.)





Objective


Vital Signs: 





                               Vital Signs - 24 hr











  02/25/19 02/25/19 02/25/19





  15:30 15:35 15:40


 


Temperature   


 


Heart Rate   


 


Heart Rate [ 81  80





Monitoring   





electrodes]   


 


Respiratory 8 L  8 L





Rate   


 


Blood Pressure 82/62 L 100/67 97/68





[Left Brachial   





artery]   


 


Blood Pressure   





[Left Radial   





artery]   


 


Blood Pressure   





[Right Brachial   





artery]   


 


O2 Saturation 93  100














  02/25/19 02/25/19 02/25/19





  15:45 15:50 15:55


 


Temperature   


 


Heart Rate   


 


Heart Rate [ 81 78 74





Monitoring   





electrodes]   


 


Respiratory 7 L 7 L 6 L





Rate   


 


Blood Pressure 102/71 102/71 104/74





[Left Brachial   





artery]   


 


Blood Pressure   





[Left Radial   





artery]   


 


Blood Pressure   





[Right Brachial   





artery]   


 


O2 Saturation 97 100 98














  02/25/19 02/25/19 02/25/19





  16:00 16:05 16:10


 


Temperature   


 


Heart Rate   


 


Heart Rate [ 82 77 82





Monitoring   





electrodes]   


 


Respiratory 8 L 7 L 8 L





Rate   


 


Blood Pressure 99/67 108/71 101/70





[Left Brachial   





artery]   


 


Blood Pressure   





[Left Radial   





artery]   


 


Blood Pressure   





[Right Brachial   





artery]   


 


O2 Saturation 100 98 99














  02/25/19 02/25/19 02/25/19





  16:15 16:20 16:25


 


Temperature   


 


Heart Rate   


 


Heart Rate [ 79 83 82





Monitoring   





electrodes]   


 


Respiratory 10 L 9 L 12





Rate   


 


Blood Pressure 101/66 99/71 118/67





[Left Brachial   





artery]   


 


Blood Pressure   





[Left Radial   





artery]   


 


Blood Pressure   





[Right Brachial   





artery]   


 


O2 Saturation 100 98 99














  02/25/19 02/25/19 02/25/19





  16:30 16:35 16:40


 


Temperature   


 


Heart Rate   


 


Heart Rate [ 86 88 83





Monitoring   





electrodes]   


 


Respiratory 11 L 18 14





Rate   


 


Blood Pressure 107/71 97/68 108/70





[Left Brachial   





artery]   


 


Blood Pressure   





[Left Radial   





artery]   


 


Blood Pressure   





[Right Brachial   





artery]   


 


O2 Saturation 96 99 100














  02/25/19 02/25/19 02/25/19





  16:45 16:50 16:55


 


Temperature   


 


Heart Rate   


 


Heart Rate [ 90 85 92





Monitoring   





electrodes]   


 


Respiratory 16 11 L 18





Rate   


 


Blood Pressure 105/73 122/68 109/74





[Left Brachial   





artery]   


 


Blood Pressure   





[Left Radial   





artery]   


 


Blood Pressure   





[Right Brachial   





artery]   


 


O2 Saturation 100 99 100














  02/25/19 02/25/19 02/25/19





  17:00 17:31 19:52


 


Temperature   


 


Heart Rate   86


 


Heart Rate [ 92 96 





Monitoring   





electrodes]   


 


Respiratory 13 10 L 





Rate   


 


Blood Pressure 95/67 92/62 





[Left Brachial   





artery]   


 


Blood Pressure   





[Left Radial   





artery]   


 


Blood Pressure   





[Right Brachial   





artery]   


 


O2 Saturation 100 100 














  02/25/19 02/25/19 02/25/19





  20:34 22:00 23:00


 


Temperature 36.4 C L  


 


Heart Rate   


 


Heart Rate [ 87 81 77





Monitoring   





electrodes]   


 


Respiratory 11 L 11 L 10 L





Rate   


 


Blood Pressure   





[Left Brachial   





artery]   


 


Blood Pressure 91/48 L 97/63 135/71 H





[Left Radial   





artery]   


 


Blood Pressure   96/74





[Right Brachial   





artery]   


 


O2 Saturation 100 100 100














  02/26/19 02/26/19 02/26/19





  00:00 01:00 02:00


 


Temperature   


 


Heart Rate   


 


Heart Rate [ 69 78 67





Monitoring   





electrodes]   


 


Respiratory 10 L 11 L 8 L





Rate   


 


Blood Pressure   





[Left Brachial   





artery]   


 


Blood Pressure 116/57 L 101/1 L 84/77 L





[Left Radial   





artery]   


 


Blood Pressure 83/65 L 72/57 L 81/61 L





[Right Brachial   





artery]   


 


O2 Saturation 100 100 100














  02/26/19 02/26/19 02/26/19





  02:47 03:00 04:00


 


Temperature   37.3 C


 


Heart Rate 71  


 


Heart Rate [  68 70





Monitoring   





electrodes]   


 


Respiratory  8 L 16





Rate   


 


Blood Pressure   





[Left Brachial   





artery]   


 


Blood Pressure  97/53 L 98/50 L





[Left Radial   





artery]   


 


Blood Pressure  70/50 L 71/62 L





[Right Brachial   





artery]   


 


O2 Saturation  100 100














  02/26/19 02/26/19 02/26/19





  05:00 05:09 06:00


 


Temperature   


 


Heart Rate  71 


 


Heart Rate [ 75  70





Monitoring   





electrodes]   


 


Respiratory 18  15





Rate   


 


Blood Pressure   





[Left Brachial   





artery]   


 


Blood Pressure 99/49 L  110/51 L





[Left Radial   





artery]   


 


Blood Pressure 72/60 L  68/57 L





[Right Brachial   





artery]   


 


O2 Saturation 100  100














  02/26/19 02/26/19 02/26/19





  07:00 07:15 07:30


 


Temperature   


 


Heart Rate   


 


Heart Rate [ 71 78 77





Monitoring   





electrodes]   


 


Respiratory 25 H  





Rate   


 


Blood Pressure   





[Left Brachial   





artery]   


 


Blood Pressure 99/51 L 99/53 L 98/48 L





[Left Radial   





artery]   


 


Blood Pressure 71/61 L  





[Right Brachial   





artery]   


 


O2 Saturation 98  














  02/26/19 02/26/19 02/26/19





  07:31 07:45 08:00


 


Temperature   37.4 C


 


Heart Rate 82  


 


Heart Rate [  80 76





Monitoring   





electrodes]   


 


Respiratory   20





Rate   


 


Blood Pressure   





[Left Brachial   





artery]   


 


Blood Pressure  100/53 L 92/67





[Left Radial   





artery]   


 


Blood Pressure   102/52 L





[Right Brachial   





artery]   


 


O2 Saturation   100














  02/26/19 02/26/19 02/26/19





  08:15 08:30 08:45


 


Temperature   


 


Heart Rate   


 


Heart Rate [ 84 82 81





Monitoring   





electrodes]   


 


Respiratory   





Rate   


 


Blood Pressure   





[Left Brachial   





artery]   


 


Blood Pressure 102/54 L 99/55 L 94/52 L





[Left Radial   





artery]   


 


Blood Pressure   





[Right Brachial   





artery]   


 


O2 Saturation   














  02/26/19 02/26/19 02/26/19





  09:00 09:15 09:26


 


Temperature   


 


Heart Rate   78


 


Heart Rate [ 109 H 78 





Monitoring   





electrodes]   


 


Respiratory 15  





Rate   


 


Blood Pressure   





[Left Brachial   





artery]   


 


Blood Pressure  96/51 L 





[Left Radial   





artery]   


 


Blood Pressure 107/62  





[Right Brachial   





artery]   


 


O2 Saturation 98  














  02/26/19 02/26/19 02/26/19





  09:30 09:45 10:00


 


Temperature   37.5 C


 


Heart Rate   


 


Heart Rate [  78 





Monitoring   





electrodes]   


 


Respiratory   18





Rate   


 


Blood Pressure   





[Left Brachial   





artery]   


 


Blood Pressure 115/63 91/53 L 83/43 L





[Left Radial   





artery]   


 


Blood Pressure   





[Right Brachial   





artery]   


 


O2 Saturation   97














  02/26/19 02/26/19 02/26/19





  10:05 10:10 10:15


 


Temperature   


 


Heart Rate   


 


Heart Rate [ 70 71 72





Monitoring   





electrodes]   


 


Respiratory  11 L 





Rate   


 


Blood Pressure   





[Left Brachial   





artery]   


 


Blood Pressure  92/49 L 93/50 L





[Left Radial   





artery]   


 


Blood Pressure 84/48 L  





[Right Brachial   





artery]   


 


O2 Saturation  98 














  02/26/19 02/26/19 02/26/19





  10:30 10:45 11:00


 


Temperature   37.4 C


 


Heart Rate   


 


Heart Rate [ 72 75 73





Monitoring   





electrodes]   


 


Respiratory   13





Rate   


 


Blood Pressure   





[Left Brachial   





artery]   


 


Blood Pressure 95/50 L 95/51 L 111/55 L





[Left Radial   





artery]   


 


Blood Pressure   





[Right Brachial   





artery]   


 


O2 Saturation   97














  02/26/19 02/26/19 02/26/19





  11:15 11:20 11:25


 


Temperature   


 


Heart Rate   


 


Heart Rate [ 68 72 73





Monitoring   





electrodes]   


 


Respiratory   





Rate   


 


Blood Pressure   





[Left Brachial   





artery]   


 


Blood Pressure 110/53 L 105/52 L 102/51 L





[Left Radial   





artery]   


 


Blood Pressure   





[Right Brachial   





artery]   


 


O2 Saturation   














  02/26/19 02/26/19 02/26/19





  11:30 11:35 11:40


 


Temperature   


 


Heart Rate   


 


Heart Rate [ 73 71 70





Monitoring   





electrodes]   


 


Respiratory   





Rate   


 


Blood Pressure   





[Left Brachial   





artery]   


 


Blood Pressure 107/52 L 104/51 L 111/53 L





[Left Radial   





artery]   


 


Blood Pressure   





[Right Brachial   





artery]   


 


O2 Saturation   














  02/26/19 02/26/19 02/26/19





  11:45 11:49 11:50


 


Temperature   


 


Heart Rate  75 


 


Heart Rate [ 87  





Monitoring   





electrodes]   


 


Respiratory   





Rate   


 


Blood Pressure   





[Left Brachial   





artery]   


 


Blood Pressure 122/58 L  121/58 L





[Left Radial   





artery]   


 


Blood Pressure   





[Right Brachial   





artery]   


 


O2 Saturation   














  02/26/19 02/26/19 02/26/19





  11:55 12:00 12:15


 


Temperature  37.4 C 


 


Heart Rate   


 


Heart Rate [  72 77





Monitoring   





electrodes]   


 


Respiratory  20 





Rate   


 


Blood Pressure   





[Left Brachial   





artery]   


 


Blood Pressure 101/51 L 103/53 L 95/51 L





[Left Radial   





artery]   


 


Blood Pressure   





[Right Brachial   





artery]   


 


O2 Saturation  98 














  02/26/19 02/26/19 02/26/19





  12:30 12:45 13:00


 


Temperature   37.4 C


 


Heart Rate   


 


Heart Rate [ 72 78 66





Monitoring   





electrodes]   


 


Respiratory   15





Rate   


 


Blood Pressure   





[Left Brachial   





artery]   


 


Blood Pressure 101/52 L 98/51 L 108/54 L





[Left Radial   





artery]   


 


Blood Pressure   





[Right Brachial   





artery]   


 


O2 Saturation   99














  02/26/19 02/26/19 02/26/19





  13:15 13:30 13:53


 


Temperature   


 


Heart Rate   


 


Heart Rate [ 72 69 72





Monitoring   





electrodes]   


 


Respiratory 11 L  15





Rate   


 


Blood Pressure   





[Left Brachial   





artery]   


 


Blood Pressure 102/51 L 107/53 L 103/56 L





[Left Radial   





artery]   


 


Blood Pressure   





[Right Brachial   





artery]   


 


O2 Saturation 99  99














  02/26/19 02/26/19 02/26/19





  14:05 14:55 15:00


 


Temperature   37.3 C


 


Heart Rate 62  


 


Heart Rate [  67 65





Monitoring   





electrodes]   


 


Respiratory   8 L





Rate   


 


Blood Pressure   





[Left Brachial   





artery]   


 


Blood Pressure  110/55 L 105/54 L





[Left Radial   





artery]   


 


Blood Pressure   





[Right Brachial   





artery]   


 


O2 Saturation   99














  02/26/19





  15:05


 


Temperature 


 


Heart Rate 


 


Heart Rate [ 68





Monitoring 





electrodes] 


 


Respiratory 8 L





Rate 


 


Blood Pressure 





[Left Brachial 





artery] 


 


Blood Pressure 102/52 L





[Left Radial 





artery] 


 


Blood Pressure 





[Right Brachial 





artery] 


 


O2 Saturation 98








                                     Oxygen











O2 Source [With Activity]      Nasal cannula


 


O2 Source                      Mechanical ventilator














I&O (Last 24 Hrs): 





                          Intake and Output Totals x24h











 02/24/19 02/25/19 02/26/19





 23:59 23:59 23:59


 


Intake Total  2300 4876.159


 


Output Total  119 928


 


Balance  2181 3948.159











General: No acute distress


HEENT: Other (Intubated)


Neck: Supple


Neuro: Non Focal


Cardiovascular: Regular rate, No murmurs


Respiratory: No respiratory distress, Breath sounds nml


Abdomen: Soft, Other (No bowel sounds)


Extremities: No edema, Other (Toes appear pale, are cold, has one dark purple-

black area of L 3rd toe at tip.)





- Results


Results: 





                               Laboratory Results











WBC  13.6 x10^3/uL (4.8-10.8)  H  02/26/19  05:05    


 


RBC  2.86 10^6/uL (4.70-6.10)  L  02/26/19  05:05    


 


Hgb  9.1 g/dL (14.0-18.0)  L  02/26/19  05:05    


 


Hct  26.9 % (42.0-52.0)  L  02/26/19  05:05    


 


MCV  93.9 fL (80.0-94.0)   02/26/19  05:05    


 


MCH  31.7 pg (27.0-31.0)  H  02/26/19  05:05    


 


MCHC  33.8 g/dL (32.0-36.0)   02/26/19  05:05    


 


RDW  14.2 % (12.0-15.0)   02/26/19  05:05    


 


Plt Count  375 10^3/uL (130-450)   02/26/19  05:05    


 


MPV  7.5 fL (7.4-11.4)   02/26/19  05:05    


 


Neut # (Auto)  12.4 10^3/uL (1.5-6.6)  H  02/26/19  05:05    


 


Lymph # (Auto)  0.6 10^3/uL (1.5-3.5)  L  02/26/19  05:05    


 


Mono # (Auto)  0.7 10^3/uL (0.0-1.0)   02/26/19  05:05    


 


Eos # (Auto)  0.0 10^3/uL (0.0-0.7)   02/26/19  05:05    


 


Baso # (Auto)  0.0 10^3/uL (0.0-0.1)   02/26/19  05:05    


 


Absolute Nucleated RBC  0.00 x10^3/uL  02/26/19  05:05    


 


Total Counted  100   02/25/19  10:59    


 


Band Neuts % (Manual)  7 % (0-10)  02/25/19  10:59    


 


Abnorm Lymph % (Manual)  0 %  02/25/19  10:59    


 


Nucleated RBC %  0.0 /100WBC  02/26/19  05:05    


 


Neutrophils # (Manual)  11.3 10^3/uL (1.5-6.6)  H  02/25/19  10:59    


 


Lymphocytes # (Manual)  0.5 10^3/uL (1.5-3.5)  L  02/25/19  10:59    


 


Monocytes # (Manual)  0.8 10^3/uL (0.0-1.0)   02/25/19  10:59    


 


Eosinophils # (Manual)  0.0 10^3/uL (0-0.7)   02/25/19  10:59    


 


Basophils # (Manual)  0.0 10^3/uL (0-0.1)   02/25/19  10:59    


 


Differential Comment  MANUAL DIFFERENTIAL   02/25/19  10:59    


 


Platelet Estimate  INCREASED (>450,000)  (NORMAL)   02/25/19  10:59    


 


Platelet Morphology  NORMAL APPEARANCE  (NORMAL)   02/25/19  10:59    


 


RBC Morph Micro Appear  NORMAL APPEARANCE  (NORMAL)   02/25/19  10:59    


 


Bld Gas Analysis Time  0526 02/26/19  05:15    


 


Sample Site  A-LINE   02/26/19  05:15    


 


ABG pH  7.48  (7.35-7.45)  H  02/26/19  05:15    


 


ABG pCO2  32 mmHg (34-45)  L  02/26/19  05:15    


 


ABG pO2  107 mmHg ()  H  02/26/19  05:15    


 


ABG HCO3  23.4 mmol/L (22.0-26.0)   02/26/19  05:15    


 


ABG Total CO2  24.4 MMOL/L (21.0-29.0)   02/26/19  05:15    


 


ABG O2 Saturation  97 % (94-98)   02/26/19  05:15    


 


ABG Base Excess  0.3 mmol/L (-2.0-3.0)   02/26/19  05:15    


 


Shaq Test  NOT APPLICABLE   02/26/19  05:15    


 


Respiration Rate  8 b/min  02/26/19  05:15    


 


O2 Delivery Device  VENTILATOR   02/26/19  05:15    


 


Vent Mode  SIMV   02/26/19  05:15    


 


FiO2  40.00   02/26/19  05:15    


 


Tidal Volume  600 mL  02/26/19  05:15    


 


PEEP  5 cmH2O  02/26/19  05:15    


 


Pressure Support Vent  10 cmH2O  02/26/19  05:15    


 


Sodium  140 mmol/L (135-145)   02/26/19  05:05    


 


Potassium  4.0 mmol/L (3.5-5.0)   02/26/19  05:05    


 


Chloride  110 mmol/L (101-111)   02/26/19  05:05    


 


Carbon Dioxide  23 mmol/L (21-32)   02/26/19  05:05    


 


Anion Gap  7.0  (6-13)   02/26/19  05:05    


 


BUN  20 mg/dL (6-20)   02/26/19  05:05    


 


Creatinine  1.0 mg/dL (0.6-1.2)   02/26/19  05:05    


 


Estimated GFR (MDRD)  72  (>89)  L  02/26/19  05:05    


 


Glucose  102 mg/dL ()  H  02/26/19  05:05    


 


Lactic Acid  1.4 mmol/L (0.5-2.2)   02/25/19  16:35    


 


Calcium  6.9 mg/dL (8.5-10.3)  L  02/26/19  05:05    


 


Phosphorus  3.8 mg/dL (2.5-4.6)   02/26/19  05:05    


 


Magnesium  1.7 mg/dL (1.7-2.8)   02/26/19  05:05    


 


Total Bilirubin  0.6 mg/dL (0.2-1.0)   02/26/19  05:05    


 


AST  14 IU/L (10-42)   02/26/19  05:05    


 


ALT  11 IU/L (10-60)   02/26/19  05:05    


 


Alkaline Phosphatase  40 IU/L ()  L  02/26/19  05:05    


 


Total Protein  3.8 g/dL (6.7-8.2)  L  02/26/19  05:05    


 


Albumin  1.3 g/dL (3.2-5.5)  L  02/26/19  05:05    


 


Globulin  2.5 g/dL (2.1-4.2)   02/26/19  05:05    


 


Albumin/Globulin Ratio  0.5  (1.0-2.2)  L  02/26/19  05:05    


 


Lipase  22 U/L (22-51)   02/25/19  10:59    


 


Urine Color  YELLOW   02/25/19  21:10    


 


Urine Clarity  CLEAR  (CLEAR)   02/25/19  21:10    


 


Urine pH  5.0 PH (5.0-7.5)   02/25/19  21:10    


 


Ur Specific Gravity  1.015  (1.002-1.030)   02/25/19  21:10    


 


Urine Protein  30 mg/dL (NEGATIVE)  H  02/25/19  21:10    


 


Urine Glucose (UA)  NEGATIVE mg/dL (NEGATIVE)   02/25/19  21:10    


 


Urine Ketones  TRACE mg/dL (NEGATIVE)   02/25/19  21:10    


 


Urine Occult Blood  TRACE-LYSE  (NEGATIVE)   02/25/19  21:10    


 


Urine Nitrite  NEGATIVE  (NEGATIVE)   02/25/19  21:10    


 


Urine Bilirubin  NEGATIVE  (NEGATIVE)   02/25/19  21:10    


 


Urine Urobilinogen  0.2 (NORMAL) E.U./dL (NORMAL)   02/25/19  21:10    


 


Ur Leukocyte Esterase  NEGATIVE  (NEGATIVE)   02/25/19  21:10    


 


Urine RBC  0-5 /HPF (0-5)   02/25/19  21:10    


 


Urine WBC  0-3 /HPF (0-3)   02/25/19  21:10    


 


Ur Epithelial Cells  FEW Transitional /HPF (<= Few)   02/25/19  21:10    


 


Ur Squamous Epith Cells  NONE SEEN  (<= Few)   02/25/19  21:10    


 


Urine Bacteria  None Seen /HPF (None Seen)   02/25/19  21:10    


 


Ur Microscopic Review  INDICATED   02/25/19  21:10    


 


Urine Culture Comments  NOT INDICATED   02/25/19  21:10    


 


MRSA Surveill Initial  NEGATIVE  (NEGATIVE)   02/25/19  14:43    


 


Blood Type  O POSITIVE   02/25/19  15:57    


 


Antibody Screen  NEGATIVE   02/25/19  15:57    














- Procedures


Procedures: 





Procedures





INCIS W REM OF FORIEGN BODY OR DEV FROM SKIN & SUBCUT TISSUE (05/30/14)


RELEASE SMALL INTESTINE, OPEN APPROACH (02/15/19)


REPAIR SMALL INTESTINE, OPEN APPROACH (02/15/19)


RESECTION OF RIGHT LARGE INTESTINE, OPEN APPROACH (02/15/19)











Sepsis Event Note (H)





- Evaluation


Current Stage of Sepsis: Septic shock


Possible source of Sepsis: positive: GI tract/intra-abdominal





- Sepsis Criteria


Sepsis Criteria: Recorded Heart Rate greater than 90 bpm, MAP less than 65 mmHg,

SBP less than 90 mmHg, Metabolic: lactate > 2 mmol/L

## 2019-02-27 LAB
ALBUMIN DIAFP-MCNC: 1.3 G/DL (ref 3.2–5.5)
ALBUMIN/GLOB SERPL: 0.5 {RATIO} (ref 1–2.2)
ALP SERPL-CCNC: 43 IU/L (ref 42–121)
ALT SERPL W P-5'-P-CCNC: 11 IU/L (ref 10–60)
ANION GAP SERPL CALCULATED.4IONS-SCNC: 4 MMOL/L (ref 6–13)
ARTERIAL PATENCY WRIST A: (no result)
ARTERIAL PATENCY WRIST A: (no result)
AST SERPL W P-5'-P-CCNC: 16 IU/L (ref 10–42)
BASE EXCESS BLDMV CALC-SCNC: 0.1 MMOL/L (ref -2–3)
BASE EXCESS BLDMV CALC-SCNC: 1.3 MMOL/L (ref -2–3)
BASOPHILS NFR BLD AUTO: 0 10^3/UL (ref 0–0.1)
BASOPHILS NFR BLD AUTO: 0.2 %
BILIRUB BLD-MCNC: 0.6 MG/DL (ref 0.2–1)
BUN SERPL-MCNC: 22 MG/DL (ref 6–20)
CALCIUM UR-MCNC: 7.1 MG/DL (ref 8.5–10.3)
CHLORIDE SERPL-SCNC: 113 MMOL/L (ref 101–111)
CO2 BLDA CALC-SCNC: 24.2 MMOL/L (ref 21–29)
CO2 BLDA CALC-SCNC: 24.7 MMOL/L (ref 21–29)
CO2 SERPL-SCNC: 23 MMOL/L (ref 21–32)
CREAT SERPLBLD-SCNC: 0.9 MG/DL (ref 0.6–1.2)
DEPRECATED HCO3 PLAS-SCNC: 23.2 MMOL/L (ref 22–26)
DEPRECATED HCO3 PLAS-SCNC: 23.8 MMOL/L (ref 22–26)
EOSINOPHIL # BLD AUTO: 0 10^3/UL (ref 0–0.7)
EOSINOPHIL NFR BLD AUTO: 0.1 %
ERYTHROCYTE [DISTWIDTH] IN BLOOD BY AUTOMATED COUNT: 14.4 % (ref 12–15)
FIO2: 0.35
FIO2: 35
GFRSERPLBLD MDRD-ARVRAT: 81 ML/MIN/{1.73_M2} (ref 89–?)
GLOBULIN SER-MCNC: 2.6 G/DL (ref 2.1–4.2)
GLUCOSE SERPL-MCNC: 193 MG/DL (ref 70–100)
HGB UR QL STRIP: 7.8 G/DL (ref 14–18)
INR PPP: 1.2 (ref 0.8–1.2)
LYMPHOCYTES # SPEC AUTO: 0.5 10^3/UL (ref 1.5–3.5)
LYMPHOCYTES NFR BLD AUTO: 5 %
MAGNESIUM SERPL-MCNC: 2.3 MG/DL (ref 1.7–2.8)
MCH RBC QN AUTO: 30.6 PG (ref 27–31)
MCHC RBC AUTO-ENTMCNC: 32.9 G/DL (ref 32–36)
MCV RBC AUTO: 93 FL (ref 80–94)
MONOCYTES # BLD AUTO: 0.4 10^3/UL (ref 0–1)
MONOCYTES NFR BLD AUTO: 4.2 %
NEUTROPHILS # BLD AUTO: 9.1 10^3/UL (ref 1.5–6.6)
NEUTROPHILS # SNV AUTO: 10 X10^3/UL (ref 4.8–10.8)
NEUTROPHILS NFR BLD AUTO: 90.5 %
PCO2 TEMP ADJ BLDCOA: 30 MMHG (ref 34–45)
PCO2 TEMP ADJ BLDCOA: 31 MMHG (ref 34–45)
PDW BLD AUTO: 7.5 FL (ref 7.4–11.4)
PH TEMP ADJ BLDA: 7.49 [PH] (ref 7.35–7.45)
PH TEMP ADJ BLDA: 7.53 [PH] (ref 7.35–7.45)
PHOSPHATE BLD-MCNC: 2.4 MG/DL (ref 2.5–4.6)
PLATELET # BLD: 304 10^3/UL (ref 130–450)
PO2 TEMP ADJ BLDCOA: 108 MMHG (ref 80–100)
PO2 TEMP ADJ BLDCOA: 87 MMHG (ref 80–100)
PREALB SERPL IA-MCNC: 3 MG/DL (ref 18–45)
PROT SPEC-MCNC: 3.9 G/DL (ref 6.7–8.2)
PROTHROM ACT/NOR PPP: 13.2 SECS (ref 9.9–12.6)
RBC MAR: 2.54 10^6/UL (ref 4.7–6.1)
SAO2 % BLDA FROM PO2: 96 % (ref 94–98)
SAO2 % BLDA FROM PO2: 97 % (ref 94–98)
SODIUM SERPLBLD-SCNC: 140 MMOL/L (ref 135–145)

## 2019-02-27 RX ADMIN — HYDROMORPHONE HYDROCHLORIDE PRN MG: 1 INJECTION, SOLUTION INTRAMUSCULAR; INTRAVENOUS; SUBCUTANEOUS at 22:16

## 2019-02-27 RX ADMIN — HYDROMORPHONE HYDROCHLORIDE PRN MG: 1 INJECTION, SOLUTION INTRAMUSCULAR; INTRAVENOUS; SUBCUTANEOUS at 03:58

## 2019-02-27 RX ADMIN — SODIUM CHLORIDE, PRESERVATIVE FREE PRN ML: 5 INJECTION INTRAVENOUS at 19:57

## 2019-02-27 RX ADMIN — ENOXAPARIN SODIUM SCH MG: 100 INJECTION SUBCUTANEOUS at 09:43

## 2019-02-27 RX ADMIN — SODIUM CHLORIDE SCH MLS/HR: 9 INJECTION, SOLUTION INTRAVENOUS at 19:05

## 2019-02-27 RX ADMIN — HYDROMORPHONE HYDROCHLORIDE PRN MG: 1 INJECTION, SOLUTION INTRAMUSCULAR; INTRAVENOUS; SUBCUTANEOUS at 16:20

## 2019-02-27 RX ADMIN — SODIUM CHLORIDE, PRESERVATIVE FREE SCH: 5 INJECTION INTRAVENOUS at 01:11

## 2019-02-27 RX ADMIN — SODIUM CHLORIDE, PRESERVATIVE FREE SCH ML: 5 INJECTION INTRAVENOUS at 09:32

## 2019-02-27 RX ADMIN — CHLORHEXIDINE GLUCONATE SCH: 1.2 SOLUTION ORAL at 19:54

## 2019-02-27 RX ADMIN — HYDROMORPHONE HYDROCHLORIDE PRN MG: 1 INJECTION, SOLUTION INTRAMUSCULAR; INTRAVENOUS; SUBCUTANEOUS at 12:10

## 2019-02-27 RX ADMIN — SODIUM CHLORIDE SCH MG: 9 INJECTION, SOLUTION INTRAVENOUS at 09:43

## 2019-02-27 RX ADMIN — SODIUM CHLORIDE SCH MLS/HR: 900 INJECTION INTRAVENOUS at 19:54

## 2019-02-27 RX ADMIN — SODIUM CHLORIDE SCH MLS/HR: 900 INJECTION INTRAVENOUS at 04:00

## 2019-02-27 RX ADMIN — POTASSIUM CHLORIDE SCH MLS/HR: 14.9 INJECTION, SOLUTION INTRAVENOUS at 07:07

## 2019-02-27 RX ADMIN — SODIUM CHLORIDE, PRESERVATIVE FREE PRN ML: 5 INJECTION INTRAVENOUS at 22:10

## 2019-02-27 RX ADMIN — I.V. FAT EMULSION SCH MLS/HR: 20 EMULSION INTRAVENOUS at 19:05

## 2019-02-27 RX ADMIN — POTASSIUM CHLORIDE SCH MLS/HR: 14.9 INJECTION, SOLUTION INTRAVENOUS at 08:15

## 2019-02-27 RX ADMIN — CHLORHEXIDINE GLUCONATE SCH ML: 1.2 SOLUTION ORAL at 12:20

## 2019-02-27 RX ADMIN — ACETAMINOPHEN PRN MLS/HR: 10 INJECTION, SOLUTION INTRAVENOUS at 00:07

## 2019-02-27 RX ADMIN — SODIUM CHLORIDE, PRESERVATIVE FREE SCH ML: 5 INJECTION INTRAVENOUS at 17:07

## 2019-02-27 RX ADMIN — SODIUM CHLORIDE SCH MG: 9 INJECTION, SOLUTION INTRAVENOUS at 22:10

## 2019-02-27 RX ADMIN — SODIUM CHLORIDE SCH MLS/HR: 900 INJECTION INTRAVENOUS at 12:00

## 2019-02-27 NOTE — PROVIDER PROGRESS NOTE
Assessment/Plan





- Problem List


(1) Perforated abdominal viscus


Assessment/Plan: 


No bowel sounds but soft none tender abdomen on exam.


Continue with ileostomy care, iv antibiotics and ng tube, bowel rest.


Appreciate surgery following along. He is POD #2


Will get weaning parameters and paln extubation today.








(2) Anemia


Assessment/Plan: 


Pt required a Unit of blood transfusion ordered for today.


Follow CBC daily.


Will hopefully start po Iron replacement when able.








(3) Hypokalemia


Assessment/Plan: 


Continue peripheral iv replacement and K riders.


Follow BMP daily.








(4) Adenocarcinoma, intestinal type


Assessment/Plan: 


Chemo was to be offered after stabilization.


The family told , Kaylan, that they never got a report of what the

tumor was. I printed the pathology report and gave it to the daughter today.








(5) Black toe


Assessment/Plan: 


The toes are wrm and pink. the spot that was black yesterdat is red and looks 

like ablister today.


Will monitor.








(6) Septic shock


Assessment/Plan: 


Resolved








- Current Meds


Current Meds: 





                               Current Medications











Generic Name Dose Route Start Last Admin





  Trade Name Freq  PRN Reason Stop Dose Admin


 


Enoxaparin Sodium  40 mg  02/26/19 09:00  02/27/19 09:43





  Lovenox  SUBQ   40 mg





  DAILY LILLIANA   Administration





     





     





     





     


 


Hydromorphone HCl  1 mg  02/25/19 12:38  02/27/19 03:58





  Dilaudid Inj Carp  IVP   1 mg





  Q2HR PRN   Administration





  Pain 8 to 10   





     





     





     


 


Piperacillin Sod/Tazobactam  100 mls @ 25 mls/hr  02/25/19 20:00  02/27/19 08:00





  Sod 4.5 gm/ Sodium Chloride  IV   Infused





  Q8H LILLIANA   Infusion





     





     





     





     


 


Norepinephrine Bitartrate 8 mg  250 mls @ 15 mls/hr  02/26/19 08:00  02/27/19 

01:10





  / Dextrose  IV   Not Given





  [id number] LILLIANA   





     





     





  Protocol   





  8 MCG/MIN   


 


Acetaminophen  100 mls @ 400 mls/hr  02/26/19 01:33  02/27/19 00:30





  Ofirmev  IV   Infused





  Q6HR PRN   Infusion





  Pain or T>100.4   





     





     





     


 


Fat Emulsion Intravenous  250 mls @ 21 mls/hr  02/26/19 19:00  02/27/19 07:04





  Intralipid 20%  IV   Infused





  1900 LILLIANA   Infusion





     





     





     





     


 


Multivitamins 10 ml/ Chromium/  2,011 mls @ 55 mls/hr  02/26/19 15:00  02/27/19 

09:00





Copper/Manganese/Seleni/Zn 1  IV   55 mls/hr





ml/ Amino Ac/Electrol/Dextrose  Q24H LILLIANA   Infusion





/Calcium     





     





     





     


 


Potassium Phosphate 15 mmol/  255 mls @ 63 mls/hr  02/27/19 08:00  02/27/19 0

8:30





  Sodium Chloride  IV  02/27/19 12:00  63 mls/hr





  ONCE LILLIANA   Administration





     





     





  Protocol   





     


 


Pantoprazole Sodium  40 mg  02/26/19 02:00  02/27/19 09:43





  Protonix  IV   40 mg





  BID LILLIANA   Administration





     





     





     





     


 


Sodium Chloride  10 ml  02/25/19 17:00  02/27/19 09:32





  Normal Saline Flush 0.9%  IVP   10 ml





  0100,0900,1700 LILLIANA   Administration





     





     





     





     


 


Sodium Chloride  10 ml  02/25/19 12:38  02/26/19 19:45





  Normal Saline Flush 0.9%  IVP   10 ml





  PRN PRN   Administration





  AS NEEDED PER PROVIDER ORDERS   





     





     





     














- Lab Result


Fish Bone Diagrams: 


                                 02/27/19 05:00





                                 02/27/19 05:00





- Additional Planning


My Orders: 





My Active Orders





02/26/19 14:21


Daily Weight [RC] DAILY 


Nutrition Consult [CONS] Routine 





02/26/19 15:00


Multivitamin [Infuvite] 10 ml Trace Elements V Conc [Multitrace-5 Conc Vial] 1 

ml   TPN (Clinimix E 5/15) [Clinimix E 5%-15% Solution] 2,000 ml IV Q24H 





02/26/19 19:00


Fat Emulsion 20% [Intralipid 20%] 250 ml IV 1900 





02/27/19 12:00


Chlorhexidine [Peridex]   15 ml PO BID 





03/01/19 05:00


COMPREHENSIVE METABOLIC PANEL [CHEM] Routine 


MAGNESIUM [CHEM] Routine 


PHOSPHORUS [CHEM] Routine 


PREALBUMIN [CHEM] Routine 


TRIGLYCERIDES [CHEM] Routine 





03/03/19 05:00


COMPREHENSIVE METABOLIC PANEL [CHEM] Routine 


MAGNESIUM [CHEM] Routine 


PHOSPHORUS [CHEM] Routine 


PREALBUMIN [CHEM] Routine 


TRIGLYCERIDES [CHEM] Routine 





03/06/19 05:00


CBC - COMP BLD CT W/AUTO DIFF [HEME] Routine 


COMPREHENSIVE METABOLIC PANEL [CHEM] Routine 


MAGNESIUM [CHEM] Routine 


PHOSPHORUS [CHEM] Routine 


PREALBUMIN [CHEM] Routine 


PT WITH INR [COAG] Routine 


TRIGLYCERIDES [CHEM] Routine 














Subjective





- Subjective


Patient Reports: Resting Comfortably





Objective


Vital Signs: 





                               Vital Signs - 24 hr











  02/26/19 02/26/19 02/26/19





  10:45 11:00 11:15


 


Temperature  37.4 C 


 


Heart Rate   


 


Heart Rate [ 75 73 68





Monitoring   





electrodes]   


 


Respiratory  13 





Rate   


 


Blood Pressure 95/51 L 111/55 L 110/53 L





[Left Radial   





artery]   


 


Blood Pressure   





[Right Brachial   





artery]   


 


O2 Saturation  97 














  02/26/19 02/26/19 02/26/19





  11:20 11:25 11:30


 


Temperature   


 


Heart Rate   


 


Heart Rate [ 72 73 73





Monitoring   





electrodes]   


 


Respiratory   





Rate   


 


Blood Pressure 105/52 L 102/51 L 107/52 L





[Left Radial   





artery]   


 


Blood Pressure   





[Right Brachial   





artery]   


 


O2 Saturation   














  02/26/19 02/26/19 02/26/19





  11:35 11:40 11:45


 


Temperature   


 


Heart Rate   


 


Heart Rate [ 71 70 87





Monitoring   





electrodes]   


 


Respiratory   





Rate   


 


Blood Pressure 104/51 L 111/53 L 122/58 L





[Left Radial   





artery]   


 


Blood Pressure   





[Right Brachial   





artery]   


 


O2 Saturation   














  02/26/19 02/26/19 02/26/19





  11:49 11:50 11:55


 


Temperature   


 


Heart Rate 75  


 


Heart Rate [   





Monitoring   





electrodes]   


 


Respiratory   





Rate   


 


Blood Pressure  121/58 L 101/51 L





[Left Radial   





artery]   


 


Blood Pressure   





[Right Brachial   





artery]   


 


O2 Saturation   














  02/26/19 02/26/19 02/26/19





  12:00 12:15 12:30


 


Temperature 37.4 C  


 


Heart Rate   


 


Heart Rate [ 72 77 72





Monitoring   





electrodes]   


 


Respiratory 20  





Rate   


 


Blood Pressure 103/53 L 95/51 L 101/52 L





[Left Radial   





artery]   


 


Blood Pressure   





[Right Brachial   





artery]   


 


O2 Saturation 98  














  02/26/19 02/26/19 02/26/19





  12:45 13:00 13:15


 


Temperature  37.4 C 


 


Heart Rate   


 


Heart Rate [ 78 66 72





Monitoring   





electrodes]   


 


Respiratory  15 11 L





Rate   


 


Blood Pressure 98/51 L 108/54 L 102/51 L





[Left Radial   





artery]   


 


Blood Pressure   





[Right Brachial   





artery]   


 


O2 Saturation  99 99














  02/26/19 02/26/19 02/26/19





  13:30 13:53 14:05


 


Temperature   


 


Heart Rate   62


 


Heart Rate [ 69 72 





Monitoring   





electrodes]   


 


Respiratory  15 





Rate   


 


Blood Pressure 107/53 L 103/56 L 





[Left Radial   





artery]   


 


Blood Pressure   





[Right Brachial   





artery]   


 


O2 Saturation  99 














  02/26/19 02/26/19 02/26/19





  14:55 15:00 15:05


 


Temperature  37.3 C 


 


Heart Rate   


 


Heart Rate [ 67 65 68





Monitoring   





electrodes]   


 


Respiratory  8 L 8 L





Rate   


 


Blood Pressure 110/55 L 105/54 L 102/52 L





[Left Radial   





artery]   


 


Blood Pressure   





[Right Brachial   





artery]   


 


O2 Saturation  99 98














  02/26/19 02/26/19 02/26/19





  15:10 15:15 15:40


 


Temperature   


 


Heart Rate   


 


Heart Rate [ 68 68 76





Monitoring   





electrodes]   


 


Respiratory   





Rate   


 


Blood Pressure 103/58 L 102/52 L 118/57 L





[Left Radial   





artery]   


 


Blood Pressure   





[Right Brachial   





artery]   


 


O2 Saturation   














  02/26/19 02/26/19 02/26/19





  15:45 15:50 15:55


 


Temperature   


 


Heart Rate   


 


Heart Rate [ 72 69 75





Monitoring   





electrodes]   


 


Respiratory   





Rate   


 


Blood Pressure 118/55 L 113/53 L 108/53 L





[Left Radial   





artery]   


 


Blood Pressure   





[Right Brachial   





artery]   


 


O2 Saturation   














  02/26/19 02/26/19 02/26/19





  16:05 16:10 16:15


 


Temperature   


 


Heart Rate   


 


Heart Rate [ 72 72 79





Monitoring   





electrodes]   


 


Respiratory   





Rate   


 


Blood Pressure 122/55 L 122/57 L 126/62





[Left Radial   





artery]   


 


Blood Pressure   





[Right Brachial   





artery]   


 


O2 Saturation   














  02/26/19 02/26/19 02/26/19





  16:20 16:21 16:25


 


Temperature   


 


Heart Rate  72 


 


Heart Rate [ 73  73





Monitoring   





electrodes]   


 


Respiratory   





Rate   


 


Blood Pressure 141/60 H  112/54 L





[Left Radial   





artery]   


 


Blood Pressure   





[Right Brachial   





artery]   


 


O2 Saturation   














  02/26/19 02/26/19 02/26/19





  16:30 16:35 16:40


 


Temperature 37.3 C  


 


Heart Rate   


 


Heart Rate [ 98 72 73





Monitoring   





electrodes]   


 


Respiratory 9 L  





Rate   


 


Blood Pressure 109/51 L 110/51 L 110/52 L





[Left Radial   





artery]   


 


Blood Pressure   





[Right Brachial   





artery]   


 


O2 Saturation 98  














  02/26/19 02/26/19 02/26/19





  16:45 16:50 16:55


 


Temperature 37.3 C  


 


Heart Rate   


 


Heart Rate [ 74 70 72





Monitoring   





electrodes]   


 


Respiratory 14  





Rate   


 


Blood Pressure 108/52 L 109/53 L 111/52 L





[Left Radial   





artery]   


 


Blood Pressure   





[Right Brachial   





artery]   


 


O2 Saturation 98  














  02/26/19 02/26/19 02/26/19





  17:00 17:05 17:10


 


Temperature 37.2 C  


 


Heart Rate   


 


Heart Rate [ 73 74 73





Monitoring   





electrodes]   


 


Respiratory 11 L  





Rate   


 


Blood Pressure 117/53 L 112/53 L 106/49 L





[Left Radial   





artery]   


 


Blood Pressure   





[Right Brachial   





artery]   


 


O2 Saturation 98  














  02/26/19 02/26/19 02/26/19





  17:15 17:20 17:25


 


Temperature   


 


Heart Rate   


 


Heart Rate [ 71 73 71





Monitoring   





electrodes]   


 


Respiratory   





Rate   


 


Blood Pressure 100/48 L 109/51 L 110/51 L





[Left Radial   





artery]   


 


Blood Pressure   





[Right Brachial   





artery]   


 


O2 Saturation   














  02/26/19 02/26/19 02/26/19





  17:30 18:00 18:15


 


Temperature  37.2 C 


 


Heart Rate   


 


Heart Rate [ 72 75 68





Monitoring   





electrodes]   


 


Respiratory  14 





Rate   


 


Blood Pressure 102/49 L 105/51 L 100/49 L





[Left Radial   





artery]   


 


Blood Pressure   





[Right Brachial   





artery]   


 


O2 Saturation  99 














  02/26/19 02/26/19 02/26/19





  18:30 18:45 18:50


 


Temperature   


 


Heart Rate   


 


Heart Rate [ 73 67 68





Monitoring   





electrodes]   


 


Respiratory   





Rate   


 


Blood Pressure 89/47 L 88/44 L 90/45 L





[Left Radial   





artery]   


 


Blood Pressure   





[Right Brachial   





artery]   


 


O2 Saturation   














  02/26/19 02/26/19 02/26/19





  18:55 19:00 20:00


 


Temperature  37.2 C 37.0 C


 


Heart Rate   


 


Heart Rate [ 74 74 61





Monitoring   





electrodes]   


 


Respiratory  17 12





Rate   


 


Blood Pressure 97/47 L 97/48 L 101/47 L





[Left Radial   





artery]   


 


Blood Pressure   74/62 L





[Right Brachial   





artery]   


 


O2 Saturation  97 99














  02/26/19 02/26/19 02/26/19





  21:00 22:00 22:54


 


Temperature   


 


Heart Rate   61


 


Heart Rate [ 60 65 





Monitoring   





electrodes]   


 


Respiratory 8 L 18 





Rate   


 


Blood Pressure 110/48 L 117/49 L 





[Left Radial   





artery]   


 


Blood Pressure 78/57 L 70/58 L 





[Right Brachial   





artery]   


 


O2 Saturation 98 98 














  02/26/19 02/27/19 02/27/19





  23:00 00:00 01:00


 


Temperature  36.9 C 


 


Heart Rate   


 


Heart Rate [ 71 61 60





Monitoring   





electrodes]   


 


Respiratory 7 L 9 L 8 L





Rate   


 


Blood Pressure 98/43 L 116/52 L 





[Left Radial   





artery]   


 


Blood Pressure  80/66 L 105/50 L





[Right Brachial   





artery]   


 


O2 Saturation 97 97 98














  02/27/19 02/27/19 02/27/19





  01:30 02:00 03:00


 


Temperature   


 


Heart Rate 59 L  


 


Heart Rate [  60 64





Monitoring   





electrodes]   


 


Respiratory  9 L 9 L





Rate   


 


Blood Pressure  100/48 L 115/54 L





[Left Radial   





artery]   


 


Blood Pressure   





[Right Brachial   





artery]   


 


O2 Saturation  98 97














  02/27/19 02/27/19 02/27/19





  04:00 04:45 05:00


 


Temperature 36.7 C  


 


Heart Rate  71 


 


Heart Rate [ 63  66





Monitoring   





electrodes]   


 


Respiratory 8 L  11 L





Rate   


 


Blood Pressure 101/50 L  113/53 L





[Left Radial   





artery]   


 


Blood Pressure   





[Right Brachial   





artery]   


 


O2 Saturation 94  99














  02/27/19 02/27/19 02/27/19





  06:00 07:00 08:00


 


Temperature   36.4 C L


 


Heart Rate   


 


Heart Rate [ 64 65 68





Monitoring   





electrodes]   


 


Respiratory 11 L 8 L 10 L





Rate   


 


Blood Pressure 123/59 L 116/55 L 87/65 L





[Left Radial   





artery]   


 


Blood Pressure   





[Right Brachial   





artery]   


 


O2 Saturation  99 100














  02/27/19 02/27/19 02/27/19





  08:30 09:00 09:30


 


Temperature  36.7 C 


 


Heart Rate 68  66


 


Heart Rate [  69 





Monitoring   





electrodes]   


 


Respiratory  16 





Rate   


 


Blood Pressure  132/64 H 





[Left Radial   





artery]   


 


Blood Pressure  92/63 





[Right Brachial   





artery]   


 


O2 Saturation  100 








                                     Oxygen











O2 Source [With Activity]      Nasal cannula


 


O2 Source                      Mechanical ventilator














I&O (Last 24 Hrs): 





                          Intake and Output Totals x24h











 02/25/19 02/26/19 02/27/19





 23:59 23:59 23:59


 


Intake Total 2300 5113.372 2659.711


 


Output Total 119 1217 465


 


Balance 2181 3896.372 2194.711











General: Other (Arousable)


HEENT: Mucous membr. moist/pink


Neck: Supple


Neuro: Other (sleeping currently, was arousable)


Cardiovascular: Regular rate, No murmurs


Respiratory: No respiratory distress, Breath sounds nml


Abdomen: Soft, Other (No bowel sounds)


Extremities: Other (1+ edema of hands and feet. The spot in medial L 3rd toe is 

red today.)





- Results


Results: 





                               Laboratory Results











WBC  10.0 x10^3/uL (4.8-10.8)   02/27/19  05:00    


 


RBC  2.54 10^6/uL (4.70-6.10)  L  02/27/19  05:00    


 


Hgb  7.8 g/dL (14.0-18.0)  L  02/27/19  05:00    


 


Hct  23.6 % (42.0-52.0)  L  02/27/19  05:00    


 


MCV  93.0 fL (80.0-94.0)   02/27/19  05:00    


 


MCH  30.6 pg (27.0-31.0)   02/27/19  05:00    


 


MCHC  32.9 g/dL (32.0-36.0)   02/27/19  05:00    


 


RDW  14.4 % (12.0-15.0)   02/27/19  05:00    


 


Plt Count  304 10^3/uL (130-450)   02/27/19  05:00    


 


MPV  7.5 fL (7.4-11.4)   02/27/19  05:00    


 


Neut # (Auto)  9.1 10^3/uL (1.5-6.6)  H  02/27/19  05:00    


 


Lymph # (Auto)  0.5 10^3/uL (1.5-3.5)  L  02/27/19  05:00    


 


Mono # (Auto)  0.4 10^3/uL (0.0-1.0)   02/27/19  05:00    


 


Eos # (Auto)  0.0 10^3/uL (0.0-0.7)   02/27/19  05:00    


 


Baso # (Auto)  0.0 10^3/uL (0.0-0.1)   02/27/19  05:00    


 


Absolute Nucleated RBC  0.00 x10^3/uL  02/27/19  05:00    


 


Total Counted  100   02/25/19  10:59    


 


Band Neuts % (Manual)  7 % (0-10)  02/25/19  10:59    


 


Abnorm Lymph % (Manual)  0 %  02/25/19  10:59    


 


Nucleated RBC %  0.0 /100WBC  02/27/19  05:00    


 


Neutrophils # (Manual)  11.3 10^3/uL (1.5-6.6)  H  02/25/19  10:59    


 


Lymphocytes # (Manual)  0.5 10^3/uL (1.5-3.5)  L  02/25/19  10:59    


 


Monocytes # (Manual)  0.8 10^3/uL (0.0-1.0)   02/25/19  10:59    


 


Eosinophils # (Manual)  0.0 10^3/uL (0-0.7)   02/25/19  10:59    


 


Basophils # (Manual)  0.0 10^3/uL (0-0.1)   02/25/19  10:59    


 


Differential Comment  MANUAL DIFFERENTIAL   02/25/19  10:59    


 


Platelet Estimate  INCREASED (>450,000)  (NORMAL)   02/25/19  10:59    


 


Platelet Morphology  NORMAL APPEARANCE  (NORMAL)   02/25/19  10:59    


 


RBC Morph Micro Appear  NORMAL APPEARANCE  (NORMAL)   02/25/19  10:59    


 


PT  13.2 secs (9.9-12.6)  H  02/27/19  05:00    


 


INR  1.2  (0.8-1.2)   02/27/19  05:00    


 


Bld Gas Analysis Time  0516   02/27/19  05:05    


 


Sample Site  A-LINE   02/27/19  05:05    


 


ABG pH  7.49  (7.35-7.45)  H  02/27/19  05:05    


 


ABG pCO2  31 mmHg (34-45)  L  02/27/19  05:05    


 


ABG pO2  108 mmHg ()  H  02/27/19  05:05    


 


ABG HCO3  23.2 mmol/L (22.0-26.0)   02/27/19  05:05    


 


ABG Total CO2  24.2 MMOL/L (21.0-29.0)   02/27/19  05:05    


 


ABG O2 Saturation  97 % (94-98)   02/27/19  05:05    


 


ABG Base Excess  0.1 mmol/L (-2.0-3.0)   02/27/19  05:05    


 


Shaq Test  NOT APPLICABLE   02/27/19  05:05    


 


Respiration Rate  8 b/min  02/27/19  05:05    


 


O2 Delivery Device  VENTILATOR   02/27/19  05:05    


 


Vent Mode  SIMV   02/27/19  05:05    


 


FiO2  35.00   02/27/19  05:05    


 


Tidal Volume  600 mL  02/27/19  05:05    


 


PEEP  5 cmH2O  02/27/19  05:05    


 


Pressure Support Vent  10 cmH2O  02/27/19  05:05    


 


Sodium  140 mmol/L (135-145)   02/27/19  05:00    


 


Potassium  3.2 mmol/L (3.5-5.0)  L  02/27/19  05:00    


 


Chloride  113 mmol/L (101-111)  H  02/27/19  05:00    


 


Carbon Dioxide  23 mmol/L (21-32)   02/27/19  05:00    


 


Anion Gap  4.0  (6-13)  L  02/27/19  05:00    


 


BUN  22 mg/dL (6-20)  H  02/27/19  05:00    


 


Creatinine  0.9 mg/dL (0.6-1.2)   02/27/19  05:00    


 


Estimated GFR (MDRD)  81  (>89)  L  02/27/19  05:00    


 


Glucose  193 mg/dL ()  H  02/27/19  05:00    


 


Lactic Acid  1.4 mmol/L (0.5-2.2)   02/25/19  16:35    


 


Calcium  7.1 mg/dL (8.5-10.3)  L  02/27/19  05:00    


 


Phosphorus  2.4 mg/dL (2.5-4.6)  L  02/27/19  05:00    


 


Magnesium  2.3 mg/dL (1.7-2.8)   02/27/19  05:00    


 


Total Bilirubin  0.6 mg/dL (0.2-1.0)   02/27/19  05:00    


 


AST  16 IU/L (10-42)   02/27/19  05:00    


 


ALT  11 IU/L (10-60)   02/27/19  05:00    


 


Alkaline Phosphatase  43 IU/L ()   02/27/19  05:00    


 


Total Protein  3.9 g/dL (6.7-8.2)  L  02/27/19  05:00    


 


Albumin  1.3 g/dL (3.2-5.5)  L  02/27/19  05:00    


 


Globulin  2.6 g/dL (2.1-4.2)   02/27/19  05:00    


 


Albumin/Globulin Ratio  0.5  (1.0-2.2)  L  02/27/19  05:00    


 


Prealbumin  3 mg/dL (18-45)  L  02/27/19  05:00    


 


Triglycerides  120 mg/dL (-149)  02/27/19  05:00    


 


Lipase  22 U/L (22-51)   02/25/19  10:59    


 


Urine Color  YELLOW   02/25/19  21:10    


 


Urine Clarity  CLEAR  (CLEAR)   02/25/19  21:10    


 


Urine pH  5.0 PH (5.0-7.5)   02/25/19  21:10    


 


Ur Specific Gravity  1.015  (1.002-1.030)   02/25/19  21:10    


 


Urine Protein  30 mg/dL (NEGATIVE)  H  02/25/19  21:10    


 


Urine Glucose (UA)  NEGATIVE mg/dL (NEGATIVE)   02/25/19  21:10    


 


Urine Ketones  TRACE mg/dL (NEGATIVE)   02/25/19  21:10    


 


Urine Occult Blood  TRACE-LYSE  (NEGATIVE)   02/25/19  21:10    


 


Urine Nitrite  NEGATIVE  (NEGATIVE)   02/25/19  21:10    


 


Urine Bilirubin  NEGATIVE  (NEGATIVE)   02/25/19  21:10    


 


Urine Urobilinogen  0.2 (NORMAL) E.U./dL (NORMAL)   02/25/19  21:10    


 


Ur Leukocyte Esterase  NEGATIVE  (NEGATIVE)   02/25/19  21:10    


 


Urine RBC  0-5 /HPF (0-5)   02/25/19  21:10    


 


Urine WBC  0-3 /HPF (0-3)   02/25/19  21:10    


 


Ur Epithelial Cells  FEW Transitional /HPF (<= Few)   02/25/19  21:10    


 


Ur Squamous Epith Cells  NONE SEEN  (<= Few)   02/25/19  21:10    


 


Urine Bacteria  None Seen /HPF (None Seen)   02/25/19  21:10    


 


Ur Microscopic Review  INDICATED   02/25/19  21:10    


 


Urine Culture Comments  NOT INDICATED   02/25/19  21:10    


 


MRSA Surveill Initial  NEGATIVE  (NEGATIVE)   02/25/19  14:43    


 


Blood Type  O POSITIVE   02/25/19  15:57    


 


Antibody Screen  NEGATIVE   02/25/19  15:57    


 


Crossmatch IS Only  See Detail   02/25/19  15:57    














- Procedures


Procedures: 





Procedures





INCIS W REM OF FORIEGN BODY OR DEV FROM SKIN & SUBCUT TISSUE (05/30/14)


RELEASE SMALL INTESTINE, OPEN APPROACH (02/15/19)


REPAIR SMALL INTESTINE, OPEN APPROACH (02/15/19)


RESECTION OF RIGHT LARGE INTESTINE, OPEN APPROACH (02/15/19)











Sepsis Event Note (H)





- Evaluation


Current Stage of Sepsis: Resolved (resolving)


Possible source of Sepsis: positive: GI tract/intra-abdominal





- Sepsis Criteria


Sepsis Criteria: Recorded Heart Rate greater than 90 bpm, MAP less than 65 mmHg,

SBP less than 90 mmHg, Metabolic: lactate > 2 mmol/L

## 2019-02-27 NOTE — PROVIDER PROGRESS NOTE
Assessment/Plan





- Problem List


(1) Perforated abdominal viscus


Assessment/Plan: 


Doing well PO Day 2 s/p partial colectomy, ileostomy, Goode's pouch 

construction. Cardiopulmonary function improved, no longer on pressors, 

anticipating extubation today. Renal function improved, alert, with decreasing 

NG output and viable ileostomy. TPN started yesterday. Rec: agree with 

extubation attempt today, would decrease IVF, continue to correct 

fluid/elecrolyte abnormalities, perhaps OOB today as tolerated.








(2) Ischemic leg


Assessment/Plan: 


Improving, likely due to norepinephrine; Rec: continue to avoid compression to 

lower extremities; using enoxaparin for VTE prophylaxis; local wound care as 

needed.








- Current Meds


Current Meds: 





                               Current Medications











Generic Name Dose Route Start Last Admin





  Trade Name Freq  PRN Reason Stop Dose Admin


 


Enoxaparin Sodium  40 mg  02/26/19 09:00  02/26/19 09:31





  Lovenox  SUBQ   40 mg





  DAILY LILLIANA   Administration





     





     





     





     


 


Hydromorphone HCl  1 mg  02/25/19 12:38  02/27/19 03:58





  Dilaudid Inj Carp  IVP   1 mg





  Q2HR PRN   Administration





  Pain 8 to 10   





     





     





     


 


Piperacillin Sod/Tazobactam  100 mls @ 25 mls/hr  02/25/19 20:00  02/27/19 04:00





  Sod 4.5 gm/ Sodium Chloride  IV   25 mls/hr





  Q8H LILLIANA   Administration





     





     





     





     


 


Norepinephrine Bitartrate 8 mg  250 mls @ 15 mls/hr  02/26/19 08:00  02/27/19 

01:10





  / Dextrose  IV   Not Given





  [id number] LILLIANA   





     





     





  Protocol   





  8 MCG/MIN   


 


Acetaminophen  100 mls @ 400 mls/hr  02/26/19 01:33  02/27/19 00:30





  Ofirmev  IV   Infused





  Q6HR PRN   Infusion





  Pain or T>100.4   





     





     





     


 


Fat Emulsion Intravenous  250 mls @ 21 mls/hr  02/26/19 19:00  02/27/19 07:04





  Intralipid 20%  IV   Infused





  1900 LILLIANA   Infusion





     





     





     





     


 


Multivitamins 10 ml/ Chromium/  2,011 mls @ 55 mls/hr  02/26/19 15:00  02/26/19 

15:30





Copper/Manganese/Seleni/Zn 1  IV   55 mls/hr





ml/ Amino Ac/Electrol/Dextrose  Q24H LILLIANA   Administration





/Calcium     





     





     





     


 


Potassium Chloride  20 meq in 100 mls @ 100 mls/hr  02/27/19 07:00  02/27/19 

07:07





  Potassium Chloride  IV  02/27/19 08:59  100 mls/hr





  Q1H LILLIANA   Administration





     





     





  Protocol   





     


 


Pantoprazole Sodium  40 mg  02/26/19 02:00  02/26/19 20:16





  Protonix  IV   40 mg





  BID LILLIANA   Administration





     





     





     





     


 


Sodium Chloride  10 ml  02/25/19 17:00  02/27/19 01:11





  Normal Saline Flush 0.9%  IVP   Not Given





  0100,[id number],1700 LILLIANA   





     





     





     





     


 


Sodium Chloride  10 ml  02/25/19 12:38  02/26/19 19:45





  Normal Saline Flush 0.9%  IVP   10 ml





  PRN PRN   Administration





  AS NEEDED PER PROVIDER ORDERS   





     





     





     














- Lab Result


Lab results reviewed: Yes


Fish Bone Diagrams: 


                                 02/27/19 05:00





                                 02/27/19 05:00





- Additional Planning


My Orders: 





My Active Orders





02/26/19 08:48


Miscellaenous Nursing Order [RC] QSHIFT 





02/26/19 09:00


Enoxaparin [Lovenox]   40 mg SUBQ DAILY 





02/26/19 17:23


Miscellaenous Nursing Order [RC] QSHIFT 














Subjective





- Subjective


Patient Reports: Feeling Better (wants ET tube out; hungry), Resting Comfortably

 (mild discomfort in his toes left greater than right), Abdominal Pain (mild)





Objective


Vital Signs: 





                               Vital Signs - 24 hr











  02/26/19 02/26/19 02/26/19





  08:15 08:30 08:45


 


Temperature   


 


Heart Rate   


 


Heart Rate [ 84 82 81





Monitoring   





electrodes]   


 


Respiratory   





Rate   


 


Blood Pressure 102/54 L 99/55 L 94/52 L





[Left Radial   





artery]   


 


Blood Pressure   





[Right Brachial   





artery]   


 


O2 Saturation   














  02/26/19 02/26/19 02/26/19





  09:00 09:15 09:26


 


Temperature   


 


Heart Rate   78


 


Heart Rate [ 109 H 78 





Monitoring   





electrodes]   


 


Respiratory 15  





Rate   


 


Blood Pressure  96/51 L 





[Left Radial   





artery]   


 


Blood Pressure 107/62  





[Right Brachial   





artery]   


 


O2 Saturation 98  














  02/26/19 02/26/19 02/26/19





  09:30 09:45 10:00


 


Temperature   37.5 C


 


Heart Rate   


 


Heart Rate [  78 





Monitoring   





electrodes]   


 


Respiratory   18





Rate   


 


Blood Pressure 115/63 91/53 L 83/43 L





[Left Radial   





artery]   


 


Blood Pressure   





[Right Brachial   





artery]   


 


O2 Saturation   97














  02/26/19 02/26/19 02/26/19





  10:05 10:10 10:15


 


Temperature   


 


Heart Rate   


 


Heart Rate [ 70 71 72





Monitoring   





electrodes]   


 


Respiratory  11 L 





Rate   


 


Blood Pressure  92/49 L 93/50 L





[Left Radial   





artery]   


 


Blood Pressure 84/48 L  





[Right Brachial   





artery]   


 


O2 Saturation  98 














  02/26/19 02/26/19 02/26/19





  10:30 10:45 11:00


 


Temperature   37.4 C


 


Heart Rate   


 


Heart Rate [ 72 75 73





Monitoring   





electrodes]   


 


Respiratory   13





Rate   


 


Blood Pressure 95/50 L 95/51 L 111/55 L





[Left Radial   





artery]   


 


Blood Pressure   





[Right Brachial   





artery]   


 


O2 Saturation   97














  02/26/19 02/26/19 02/26/19





  11:15 11:20 11:25


 


Temperature   


 


Heart Rate   


 


Heart Rate [ 68 72 73





Monitoring   





electrodes]   


 


Respiratory   





Rate   


 


Blood Pressure 110/53 L 105/52 L 102/51 L





[Left Radial   





artery]   


 


Blood Pressure   





[Right Brachial   





artery]   


 


O2 Saturation   














  02/26/19 02/26/19 02/26/19





  11:30 11:35 11:40


 


Temperature   


 


Heart Rate   


 


Heart Rate [ 73 71 70





Monitoring   





electrodes]   


 


Respiratory   





Rate   


 


Blood Pressure 107/52 L 104/51 L 111/53 L





[Left Radial   





artery]   


 


Blood Pressure   





[Right Brachial   





artery]   


 


O2 Saturation   














  02/26/19 02/26/19 02/26/19





  11:45 11:49 11:50


 


Temperature   


 


Heart Rate  75 


 


Heart Rate [ 87  





Monitoring   





electrodes]   


 


Respiratory   





Rate   


 


Blood Pressure 122/58 L  121/58 L





[Left Radial   





artery]   


 


Blood Pressure   





[Right Brachial   





artery]   


 


O2 Saturation   














  02/26/19 02/26/19 02/26/19





  11:55 12:00 12:15


 


Temperature  37.4 C 


 


Heart Rate   


 


Heart Rate [  72 77





Monitoring   





electrodes]   


 


Respiratory  20 





Rate   


 


Blood Pressure 101/51 L 103/53 L 95/51 L





[Left Radial   





artery]   


 


Blood Pressure   





[Right Brachial   





artery]   


 


O2 Saturation  98 














  02/26/19 02/26/19 02/26/19





  12:30 12:45 13:00


 


Temperature   37.4 C


 


Heart Rate   


 


Heart Rate [ 72 78 66





Monitoring   





electrodes]   


 


Respiratory   15





Rate   


 


Blood Pressure 101/52 L 98/51 L 108/54 L





[Left Radial   





artery]   


 


Blood Pressure   





[Right Brachial   





artery]   


 


O2 Saturation   99














  02/26/19 02/26/19 02/26/19





  13:15 13:30 13:53


 


Temperature   


 


Heart Rate   


 


Heart Rate [ 72 69 72





Monitoring   





electrodes]   


 


Respiratory 11 L  15





Rate   


 


Blood Pressure 102/51 L 107/53 L 103/56 L





[Left Radial   





artery]   


 


Blood Pressure   





[Right Brachial   





artery]   


 


O2 Saturation 99  99














  02/26/19 02/26/19 02/26/19





  14:05 14:55 15:00


 


Temperature   37.3 C


 


Heart Rate 62  


 


Heart Rate [  67 65





Monitoring   





electrodes]   


 


Respiratory   8 L





Rate   


 


Blood Pressure  110/55 L 105/54 L





[Left Radial   





artery]   


 


Blood Pressure   





[Right Brachial   





artery]   


 


O2 Saturation   99














  02/26/19 02/26/19 02/26/19





  15:05 15:10 15:15


 


Temperature   


 


Heart Rate   


 


Heart Rate [ 68 68 68





Monitoring   





electrodes]   


 


Respiratory 8 L  





Rate   


 


Blood Pressure 102/52 L 103/58 L 102/52 L





[Left Radial   





artery]   


 


Blood Pressure   





[Right Brachial   





artery]   


 


O2 Saturation 98  














  02/26/19 02/26/19 02/26/19





  15:40 15:45 15:50


 


Temperature   


 


Heart Rate   


 


Heart Rate [ 76 72 69





Monitoring   





electrodes]   


 


Respiratory   





Rate   


 


Blood Pressure 118/57 L 118/55 L 113/53 L





[Left Radial   





artery]   


 


Blood Pressure   





[Right Brachial   





artery]   


 


O2 Saturation   














  02/26/19 02/26/19 02/26/19





  15:55 16:05 16:10


 


Temperature   


 


Heart Rate   


 


Heart Rate [ 75 72 72





Monitoring   





electrodes]   


 


Respiratory   





Rate   


 


Blood Pressure 108/53 L 122/55 L 122/57 L





[Left Radial   





artery]   


 


Blood Pressure   





[Right Brachial   





artery]   


 


O2 Saturation   














  02/26/19 02/26/19 02/26/19





  16:15 16:20 16:21


 


Temperature   


 


Heart Rate   72


 


Heart Rate [ 79 73 





Monitoring   





electrodes]   


 


Respiratory   





Rate   


 


Blood Pressure 126/62 141/60 H 





[Left Radial   





artery]   


 


Blood Pressure   





[Right Brachial   





artery]   


 


O2 Saturation   














  02/26/19 02/26/19 02/26/19





  16:25 16:30 16:35


 


Temperature  37.3 C 


 


Heart Rate   


 


Heart Rate [ 73 98 72





Monitoring   





electrodes]   


 


Respiratory  9 L 





Rate   


 


Blood Pressure 112/54 L 109/51 L 110/51 L





[Left Radial   





artery]   


 


Blood Pressure   





[Right Brachial   





artery]   


 


O2 Saturation  98 














  02/26/19 02/26/19 02/26/19





  16:40 16:45 16:50


 


Temperature  37.3 C 


 


Heart Rate   


 


Heart Rate [ 73 74 70





Monitoring   





electrodes]   


 


Respiratory  14 





Rate   


 


Blood Pressure 110/52 L 108/52 L 109/53 L





[Left Radial   





artery]   


 


Blood Pressure   





[Right Brachial   





artery]   


 


O2 Saturation  98 














  02/26/19 02/26/19 02/26/19





  16:55 17:00 17:05


 


Temperature  37.2 C 


 


Heart Rate   


 


Heart Rate [ 72 73 74





Monitoring   





electrodes]   


 


Respiratory  11 L 





Rate   


 


Blood Pressure 111/52 L 117/53 L 112/53 L





[Left Radial   





artery]   


 


Blood Pressure   





[Right Brachial   





artery]   


 


O2 Saturation  98 














  02/26/19 02/26/19 02/26/19





  17:10 17:15 17:20


 


Temperature   


 


Heart Rate   


 


Heart Rate [ 73 71 73





Monitoring   





electrodes]   


 


Respiratory   





Rate   


 


Blood Pressure 106/49 L 100/48 L 109/51 L





[Left Radial   





artery]   


 


Blood Pressure   





[Right Brachial   





artery]   


 


O2 Saturation   














  02/26/19 02/26/19 02/26/19





  17:25 17:30 18:00


 


Temperature   37.2 C


 


Heart Rate   


 


Heart Rate [ 71 72 75





Monitoring   





electrodes]   


 


Respiratory   14





Rate   


 


Blood Pressure 110/51 L 102/49 L 105/51 L





[Left Radial   





artery]   


 


Blood Pressure   





[Right Brachial   





artery]   


 


O2 Saturation   99














  02/26/19 02/26/19 02/26/19





  18:15 18:30 18:45


 


Temperature   


 


Heart Rate   


 


Heart Rate [ 68 73 67





Monitoring   





electrodes]   


 


Respiratory   





Rate   


 


Blood Pressure 100/49 L 89/47 L 88/44 L





[Left Radial   





artery]   


 


Blood Pressure   





[Right Brachial   





artery]   


 


O2 Saturation   














  02/26/19 02/26/19 02/26/19





  18:50 18:55 19:00


 


Temperature   37.2 C


 


Heart Rate   


 


Heart Rate [ 68 74 74





Monitoring   





electrodes]   


 


Respiratory   17





Rate   


 


Blood Pressure 90/45 L 97/47 L 97/48 L





[Left Radial   





artery]   


 


Blood Pressure   





[Right Brachial   





artery]   


 


O2 Saturation   97














  02/26/19 02/26/19 02/26/19





  20:00 21:00 22:00


 


Temperature 37.0 C  


 


Heart Rate   


 


Heart Rate [ 61 60 65





Monitoring   





electrodes]   


 


Respiratory 12 8 L 18





Rate   


 


Blood Pressure 101/47 L 110/48 L 117/49 L





[Left Radial   





artery]   


 


Blood Pressure 74/62 L 78/57 L 70/58 L





[Right Brachial   





artery]   


 


O2 Saturation 99 98 98














  02/26/19 02/26/19 02/27/19





  22:54 23:00 00:00


 


Temperature   36.9 C


 


Heart Rate 61  


 


Heart Rate [  71 61





Monitoring   





electrodes]   


 


Respiratory  7 L 9 L





Rate   


 


Blood Pressure  98/43 L 116/52 L





[Left Radial   





artery]   


 


Blood Pressure   80/66 L





[Right Brachial   





artery]   


 


O2 Saturation  97 97














  02/27/19 02/27/19 02/27/19





  01:00 01:30 02:00


 


Temperature   


 


Heart Rate  59 L 


 


Heart Rate [ 60  60





Monitoring   





electrodes]   


 


Respiratory 8 L  9 L





Rate   


 


Blood Pressure   100/48 L





[Left Radial   





artery]   


 


Blood Pressure 105/50 L  





[Right Brachial   





artery]   


 


O2 Saturation 98  98














  02/27/19 02/27/19 02/27/19





  03:00 04:00 04:45


 


Temperature  36.7 C 


 


Heart Rate   71


 


Heart Rate [ 64 63 





Monitoring   





electrodes]   


 


Respiratory 9 L 8 L 





Rate   


 


Blood Pressure 115/54 L 101/50 L 





[Left Radial   





artery]   


 


Blood Pressure   





[Right Brachial   





artery]   


 


O2 Saturation 97 94 














  02/27/19 02/27/19 02/27/19





  05:00 06:00 07:00


 


Temperature   


 


Heart Rate   


 


Heart Rate [ 66 64 65





Monitoring   





electrodes]   


 


Respiratory 11 L 11 L 8 L





Rate   


 


Blood Pressure 113/53 L 123/59 L 116/55 L





[Left Radial   





artery]   


 


Blood Pressure   





[Right Brachial   





artery]   


 


O2 Saturation 99  99








                                     Oxygen











O2 Source [With Activity]      Nasal cannula


 


O2 Source                      Mechanical ventilator














I&O (Last 24 Hrs): 





                          Intake and Output Totals x24h











 02/25/19 02/26/19 02/27/19





 23:59 23:59 23:59


 


Intake Total 2300 5111.789 1375.313


 


Output Total 119 1217 376


 


Balance 2181 3894.789 999.313











General: Alert, No acute distress


Neck: Supple, No JVD


Neuro: Alert


Cardiovascular: Regular rate, No murmurs


Respiratory: Chest non-tender, No respiratory distress, Breath sounds nml


Abdomen: Soft, Other (mild diffuse tenderness; ileostomy pink, edematous, no sig

output yet; midline wound ok per rn.)


Extremities: Other (both hands edematous left foot: 3rd toe with patchy changes 

of skin necrosis, no drainage)





- Results


Results: 





                               Laboratory Results











WBC  10.0 x10^3/uL (4.8-10.8)   02/27/19  05:00    


 


RBC  2.54 10^6/uL (4.70-6.10)  L  02/27/19  05:00    


 


Hgb  7.8 g/dL (14.0-18.0)  L  02/27/19  05:00    


 


Hct  23.6 % (42.0-52.0)  L  02/27/19  05:00    


 


MCV  93.0 fL (80.0-94.0)   02/27/19  05:00    


 


MCH  30.6 pg (27.0-31.0)   02/27/19  05:00    


 


MCHC  32.9 g/dL (32.0-36.0)   02/27/19  05:00    


 


RDW  14.4 % (12.0-15.0)   02/27/19  05:00    


 


Plt Count  304 10^3/uL (130-450)   02/27/19  05:00    


 


MPV  7.5 fL (7.4-11.4)   02/27/19  05:00    


 


Neut # (Auto)  9.1 10^3/uL (1.5-6.6)  H  02/27/19  05:00    


 


Lymph # (Auto)  0.5 10^3/uL (1.5-3.5)  L  02/27/19  05:00    


 


Mono # (Auto)  0.4 10^3/uL (0.0-1.0)   02/27/19  05:00    


 


Eos # (Auto)  0.0 10^3/uL (0.0-0.7)   02/27/19  05:00    


 


Baso # (Auto)  0.0 10^3/uL (0.0-0.1)   02/27/19  05:00    


 


Absolute Nucleated RBC  0.00 x10^3/uL  02/27/19  05:00    


 


Total Counted  100   02/25/19  10:59    


 


Band Neuts % (Manual)  7 % (0-10)  02/25/19  10:59    


 


Abnorm Lymph % (Manual)  0 %  02/25/19  10:59    


 


Nucleated RBC %  0.0 /100WBC  02/27/19  05:00    


 


Neutrophils # (Manual)  11.3 10^3/uL (1.5-6.6)  H  02/25/19  10:59    


 


Lymphocytes # (Manual)  0.5 10^3/uL (1.5-3.5)  L  02/25/19  10:59    


 


Monocytes # (Manual)  0.8 10^3/uL (0.0-1.0)   02/25/19  10:59    


 


Eosinophils # (Manual)  0.0 10^3/uL (0-0.7)   02/25/19  10:59    


 


Basophils # (Manual)  0.0 10^3/uL (0-0.1)   02/25/19  10:59    


 


Differential Comment  MANUAL DIFFERENTIAL   02/25/19  10:59    


 


Platelet Estimate  INCREASED (>450,000)  (NORMAL)   02/25/19  10:59    


 


Platelet Morphology  NORMAL APPEARANCE  (NORMAL)   02/25/19  10:59    


 


RBC Morph Micro Appear  NORMAL APPEARANCE  (NORMAL)   02/25/19  10:59    


 


PT  13.2 secs (9.9-12.6)  H  02/27/19  05:00    


 


INR  1.2  (0.8-1.2)   02/27/19  05:00    


 


Bld Gas Analysis Time  0516   02/27/19  05:05    


 


Sample Site  A-LINE   02/27/19  05:05    


 


ABG pH  7.49  (7.35-7.45)  H  02/27/19  05:05    


 


ABG pCO2  31 mmHg (34-45)  L  02/27/19  05:05    


 


ABG pO2  108 mmHg ()  H  02/27/19  05:05    


 


ABG HCO3  23.2 mmol/L (22.0-26.0)   02/27/19  05:05    


 


ABG Total CO2  24.2 MMOL/L (21.0-29.0)   02/27/19  05:05    


 


ABG O2 Saturation  97 % (94-98)   02/27/19  05:05    


 


ABG Base Excess  0.1 mmol/L (-2.0-3.0)   02/27/19  05:05    


 


Shaq Test  NOT APPLICABLE   02/27/19  05:05    


 


Respiration Rate  8 b/min  02/27/19  05:05    


 


O2 Delivery Device  VENTILATOR   02/27/19  05:05    


 


Vent Mode  SIMV   02/27/19  05:05    


 


FiO2  35.00   02/27/19  05:05    


 


Tidal Volume  600 mL  02/27/19  05:05    


 


PEEP  5 cmH2O  02/27/19  05:05    


 


Pressure Support Vent  10 cmH2O  02/27/19  05:05    


 


Sodium  140 mmol/L (135-145)   02/27/19  05:00    


 


Potassium  3.2 mmol/L (3.5-5.0)  L  02/27/19  05:00    


 


Chloride  113 mmol/L (101-111)  H  02/27/19  05:00    


 


Carbon Dioxide  23 mmol/L (21-32)   02/27/19  05:00    


 


Anion Gap  4.0  (6-13)  L  02/27/19  05:00    


 


BUN  22 mg/dL (6-20)  H  02/27/19  05:00    


 


Creatinine  0.9 mg/dL (0.6-1.2)   02/27/19  05:00    


 


Estimated GFR (MDRD)  81  (>89)  L  02/27/19  05:00    


 


Glucose  193 mg/dL ()  H  02/27/19  05:00    


 


Lactic Acid  1.4 mmol/L (0.5-2.2)   02/25/19  16:35    


 


Calcium  7.1 mg/dL (8.5-10.3)  L  02/27/19  05:00    


 


Phosphorus  2.4 mg/dL (2.5-4.6)  L  02/27/19  05:00    


 


Magnesium  2.3 mg/dL (1.7-2.8)   02/27/19  05:00    


 


Total Bilirubin  0.6 mg/dL (0.2-1.0)   02/27/19  05:00    


 


AST  16 IU/L (10-42)   02/27/19  05:00    


 


ALT  11 IU/L (10-60)   02/27/19  05:00    


 


Alkaline Phosphatase  43 IU/L ()   02/27/19  05:00    


 


Total Protein  3.9 g/dL (6.7-8.2)  L  02/27/19  05:00    


 


Albumin  1.3 g/dL (3.2-5.5)  L  02/27/19  05:00    


 


Globulin  2.6 g/dL (2.1-4.2)   02/27/19  05:00    


 


Albumin/Globulin Ratio  0.5  (1.0-2.2)  L  02/27/19  05:00    


 


Prealbumin  3 mg/dL (18-45)  L  02/27/19  05:00    


 


Triglycerides  120 mg/dL (-149)  02/27/19  05:00    


 


Lipase  22 U/L (22-51)   02/25/19  10:59    


 


Urine Color  YELLOW   02/25/19  21:10    


 


Urine Clarity  CLEAR  (CLEAR)   02/25/19  21:10    


 


Urine pH  5.0 PH (5.0-7.5)   02/25/19  21:10    


 


Ur Specific Gravity  1.015  (1.002-1.030)   02/25/19  21:10    


 


Urine Protein  30 mg/dL (NEGATIVE)  H  02/25/19  21:10    


 


Urine Glucose (UA)  NEGATIVE mg/dL (NEGATIVE)   02/25/19  21:10    


 


Urine Ketones  TRACE mg/dL (NEGATIVE)   02/25/19  21:10    


 


Urine Occult Blood  TRACE-LYSE  (NEGATIVE)   02/25/19  21:10    


 


Urine Nitrite  NEGATIVE  (NEGATIVE)   02/25/19  21:10    


 


Urine Bilirubin  NEGATIVE  (NEGATIVE)   02/25/19  21:10    


 


Urine Urobilinogen  0.2 (NORMAL) E.U./dL (NORMAL)   02/25/19  21:10    


 


Ur Leukocyte Esterase  NEGATIVE  (NEGATIVE)   02/25/19  21:10    


 


Urine RBC  0-5 /HPF (0-5)   02/25/19  21:10    


 


Urine WBC  0-3 /HPF (0-3)   02/25/19  21:10    


 


Ur Epithelial Cells  FEW Transitional /HPF (<= Few)   02/25/19  21:10    


 


Ur Squamous Epith Cells  NONE SEEN  (<= Few)   02/25/19  21:10    


 


Urine Bacteria  None Seen /HPF (None Seen)   02/25/19  21:10    


 


Ur Microscopic Review  INDICATED   02/25/19  21:10    


 


Urine Culture Comments  NOT INDICATED   02/25/19  21:10    


 


MRSA Surveill Initial  NEGATIVE  (NEGATIVE)   02/25/19  14:43    


 


Blood Type  O POSITIVE   02/25/19  15:57    


 


Antibody Screen  NEGATIVE   02/25/19  15:57    


 


Crossmatch IS Only  See Detail   02/25/19  15:57    














- Procedures


Procedures: 





Procedures





INCIS W REM OF FORIEGN BODY OR DEV FROM SKIN & SUBCUT TISSUE (05/30/14)


RELEASE SMALL INTESTINE, OPEN APPROACH (02/15/19)


REPAIR SMALL INTESTINE, OPEN APPROACH (02/15/19)


RESECTION OF RIGHT LARGE INTESTINE, OPEN APPROACH (02/15/19)











Sepsis Event Note (H)





- Evaluation


Current Stage of Sepsis: Resolved (resolving)


Possible source of Sepsis: positive: GI tract/intra-abdominal





ABX Reporting


Has patient been on IV antibiotics over the past 48 hours?: Yes

## 2019-02-28 LAB
ANION GAP SERPL CALCULATED.4IONS-SCNC: 10 MMOL/L (ref 6–13)
BASOPHILS NFR BLD AUTO: 0.2 10^3/UL (ref 0–0.1)
BASOPHILS NFR BLD AUTO: 1.5 %
BUN SERPL-MCNC: 18 MG/DL (ref 6–20)
CALCIUM UR-MCNC: 7.4 MG/DL (ref 8.5–10.3)
CHLORIDE SERPL-SCNC: 108 MMOL/L (ref 101–111)
CO2 SERPL-SCNC: 23 MMOL/L (ref 21–32)
CREAT SERPLBLD-SCNC: 0.8 MG/DL (ref 0.6–1.2)
EOSINOPHIL # BLD AUTO: 0 10^3/UL (ref 0–0.7)
EOSINOPHIL NFR BLD AUTO: 0.3 %
ERYTHROCYTE [DISTWIDTH] IN BLOOD BY AUTOMATED COUNT: 16.8 % (ref 12–15)
GFRSERPLBLD MDRD-ARVRAT: 93 ML/MIN/{1.73_M2} (ref 89–?)
GLUCOSE SERPL-MCNC: 133 MG/DL (ref 70–100)
HGB UR QL STRIP: 10.3 G/DL (ref 14–18)
LYMPHOCYTES # SPEC AUTO: 0.5 10^3/UL (ref 1.5–3.5)
LYMPHOCYTES NFR BLD AUTO: 3.9 %
MAGNESIUM SERPL-MCNC: 2.2 MG/DL (ref 1.7–2.8)
MCH RBC QN AUTO: 30.4 PG (ref 27–31)
MCHC RBC AUTO-ENTMCNC: 34.2 G/DL (ref 32–36)
MCV RBC AUTO: 88.7 FL (ref 80–94)
MONOCYTES # BLD AUTO: 0.6 10^3/UL (ref 0–1)
MONOCYTES NFR BLD AUTO: 4.5 %
NEUTROPHILS # BLD AUTO: 12.1 10^3/UL (ref 1.5–6.6)
NEUTROPHILS # SNV AUTO: 13.5 X10^3/UL (ref 4.8–10.8)
NEUTROPHILS NFR BLD AUTO: 89.8 %
PDW BLD AUTO: 7.4 FL (ref 7.4–11.4)
PH BLDV: 7.46 [PH] (ref 7.31–7.41)
PHOSPHATE BLD-MCNC: 2.5 MG/DL (ref 2.5–4.6)
PLATELET # BLD: 269 10^3/UL (ref 130–450)
RBC MAR: 3.38 10^6/UL (ref 4.7–6.1)
SODIUM SERPLBLD-SCNC: 141 MMOL/L (ref 135–145)

## 2019-02-28 RX ADMIN — HYDROMORPHONE HYDROCHLORIDE PRN MG: 1 INJECTION, SOLUTION INTRAMUSCULAR; INTRAVENOUS; SUBCUTANEOUS at 15:06

## 2019-02-28 RX ADMIN — ACETAMINOPHEN PRN MLS/HR: 10 INJECTION, SOLUTION INTRAVENOUS at 06:31

## 2019-02-28 RX ADMIN — CHLORHEXIDINE GLUCONATE SCH ML: 1.2 SOLUTION ORAL at 21:46

## 2019-02-28 RX ADMIN — SODIUM CHLORIDE, PRESERVATIVE FREE SCH ML: 5 INJECTION INTRAVENOUS at 03:37

## 2019-02-28 RX ADMIN — HYDROMORPHONE HYDROCHLORIDE PRN MG: 1 INJECTION, SOLUTION INTRAMUSCULAR; INTRAVENOUS; SUBCUTANEOUS at 10:20

## 2019-02-28 RX ADMIN — I.V. FAT EMULSION SCH MLS/HR: 20 EMULSION INTRAVENOUS at 19:00

## 2019-02-28 RX ADMIN — SODIUM CHLORIDE, PRESERVATIVE FREE SCH ML: 5 INJECTION INTRAVENOUS at 17:03

## 2019-02-28 RX ADMIN — ACETAMINOPHEN PRN MLS/HR: 10 INJECTION, SOLUTION INTRAVENOUS at 19:52

## 2019-02-28 RX ADMIN — CHLORHEXIDINE GLUCONATE SCH ML: 1.2 SOLUTION ORAL at 09:26

## 2019-02-28 RX ADMIN — SODIUM CHLORIDE, PRESERVATIVE FREE PRN ML: 5 INJECTION INTRAVENOUS at 10:22

## 2019-02-28 RX ADMIN — SODIUM CHLORIDE SCH MLS/HR: 900 INJECTION INTRAVENOUS at 12:00

## 2019-02-28 RX ADMIN — SODIUM CHLORIDE, PRESERVATIVE FREE PRN ML: 5 INJECTION INTRAVENOUS at 08:53

## 2019-02-28 RX ADMIN — SODIUM CHLORIDE SCH MLS/HR: 9 INJECTION, SOLUTION INTRAVENOUS at 19:00

## 2019-02-28 RX ADMIN — SODIUM CHLORIDE SCH MLS/HR: 900 INJECTION INTRAVENOUS at 19:58

## 2019-02-28 RX ADMIN — HYDROMORPHONE HYDROCHLORIDE PRN MG: 1 INJECTION, SOLUTION INTRAMUSCULAR; INTRAVENOUS; SUBCUTANEOUS at 03:36

## 2019-02-28 RX ADMIN — SODIUM CHLORIDE, PRESERVATIVE FREE SCH ML: 5 INJECTION INTRAVENOUS at 09:05

## 2019-02-28 RX ADMIN — POTASSIUM CHLORIDE SCH MLS/HR: 14.9 INJECTION, SOLUTION INTRAVENOUS at 08:29

## 2019-02-28 RX ADMIN — POTASSIUM CHLORIDE SCH MLS/HR: 14.9 INJECTION, SOLUTION INTRAVENOUS at 07:40

## 2019-02-28 RX ADMIN — SODIUM CHLORIDE SCH MLS/HR: 900 INJECTION INTRAVENOUS at 03:37

## 2019-02-28 RX ADMIN — ENOXAPARIN SODIUM SCH MG: 100 INJECTION SUBCUTANEOUS at 09:04

## 2019-02-28 RX ADMIN — SODIUM CHLORIDE SCH MG: 9 INJECTION, SOLUTION INTRAVENOUS at 08:53

## 2019-02-28 NOTE — PROVIDER PROGRESS NOTE
Subjective





- General


Admit Date: 02/25/19


Procedure Date: 02/25/19


Post Op Days: 3


Procedure Performed: expl. lap, partial colectomy with ileostomy and Goode's 

pouch constructio





- Review of Systems


Wound/Incisions: positive: Dressing dry and intact


General: positive: No symptoms (no n/v, dyspnea; minimal incisional pain, no 

foot pain), Appetite (hungry)


Pulmonary: positive: No symptoms.  negative: Shortness of breath, Pleuritic 

chest pain


Gastrointestinal: positive: Abdominal pain (mild).  negative: Nausea, Vomiting





Objective





- Patient Data


Reviewed Vital Signs: Yes


Vital Signs: 





                                Vital Signs x48h











  Temp Pulse Resp BP BP Pulse Ox


 


 02/28/19 14:00  37.1 C  82  21  179/82 H  125/93 H  100


 


 02/28/19 13:00  37 C  80  16  177/77 H  116/86 H  97


 


 02/28/19 12:00  37 C  68  12  167/73 H  119/85 H  96


 


 02/28/19 11:00  37 C  73  11 L  161/70 H  116/90 H  96


 


 02/28/19 10:00  36.8 C  72  19  160/74 H  112/81 H  96


 


 02/28/19 09:00  36.8 C  77  21  159/75 H  115/87 H  97


 


 02/28/19 08:00  36.8 C  82  22  153/76 H  116/88 H  95


 


 02/28/19 06:58  37.1 C  73  11 L  148/66 H  107/78  97











Weight: 





                                     Weight











 02/26/19 02/27/19 02/28/19





 23:59 23:59 23:59


 


Weight (kg) 55 kg 65 kg 60.1 kg











Intake & Output: 





                          Intake and Output Totals x24h











 02/26/19 02/27/19 02/28/19





 23:59 23:59 23:59


 


Intake Total 5113.372 4714.278 1887.35


 


Output Total 1217 1476 2640


 


Balance 3896.372 3238.278 -752.65














- Lab Results


Lab Results: 


                                 02/28/19 05:05





                                 02/28/19 05:05


Other Lab Results: 





                               Lab Results x24hrs











  02/28/19 02/28/19 02/28/19 Range/Units





  12:14 05:05 05:05 


 


WBC     (4.8-10.8)  x10^3/uL


 


RBC     (4.70-6.10)  10^6/uL


 


Hgb     (14.0-18.0)  g/dL


 


Hct     (42.0-52.0)  %


 


MCV     (80.0-94.0)  fL


 


MCH     (27.0-31.0)  pg


 


MCHC     (32.0-36.0)  g/dL


 


RDW     (12.0-15.0)  %


 


Plt Count     (130-450)  10^3/uL


 


MPV     (7.4-11.4)  fL


 


Neut # (Auto)     (1.5-6.6)  10^3/uL


 


Lymph # (Auto)     (1.5-3.5)  10^3/uL


 


Mono # (Auto)     (0.0-1.0)  10^3/uL


 


Eos # (Auto)     (0.0-0.7)  10^3/uL


 


Baso # (Auto)     (0.0-0.1)  10^3/uL


 


Absolute Nucleated RBC     x10^3/uL


 


Nucleated RBC %     /100WBC


 


VBG pH   7.459 H   (7.31-7.41)  


 


Ionized Calcium   1.08 L   (1.15-1.33)  mmol/L


 


Sodium     (135-145)  mmol/L


 


Potassium     (3.5-5.0)  mmol/L


 


Chloride     (101-111)  mmol/L


 


Carbon Dioxide     (21-32)  mmol/L


 


Anion Gap     (6-13)  


 


BUN     (6-20)  mg/dL


 


Creatinine     (0.6-1.2)  mg/dL


 


Estimated GFR (MDRD)     (>89)  


 


Glucose     ()  mg/dL


 


POC Whole Bld Glucose  124 H    (70 - 100)  mg/dL


 


Calcium     (8.5-10.3)  mg/dL


 


Phosphorus    2.5  (2.5-4.6)  mg/dL


 


Magnesium    2.2  (1.7-2.8)  mg/dL


 


Blood Type     


 


Antibody Screen     


 


Crossmatch IS Only     














  02/28/19 02/28/19 02/28/19 Range/Units





  05:05 05:05 00:09 


 


WBC   13.5 H   (4.8-10.8)  x10^3/uL


 


RBC   3.38 L   (4.70-6.10)  10^6/uL


 


Hgb   10.3 L   (14.0-18.0)  g/dL


 


Hct   30.0 L   (42.0-52.0)  %


 


MCV   88.7   (80.0-94.0)  fL


 


MCH   30.4   (27.0-31.0)  pg


 


MCHC   34.2   (32.0-36.0)  g/dL


 


RDW   16.8 H   (12.0-15.0)  %


 


Plt Count   269   (130-450)  10^3/uL


 


MPV   7.4   (7.4-11.4)  fL


 


Neut # (Auto)   12.1 H   (1.5-6.6)  10^3/uL


 


Lymph # (Auto)   0.5 L   (1.5-3.5)  10^3/uL


 


Mono # (Auto)   0.6   (0.0-1.0)  10^3/uL


 


Eos # (Auto)   0.0   (0.0-0.7)  10^3/uL


 


Baso # (Auto)   0.2 H   (0.0-0.1)  10^3/uL


 


Absolute Nucleated RBC   0.00   x10^3/uL


 


Nucleated RBC %   0.0   /100WBC


 


VBG pH     (7.31-7.41)  


 


Ionized Calcium  YES    (1.15-1.33)  mmol/L


 


Sodium  141    (135-145)  mmol/L


 


Potassium  3.5    (3.5-5.0)  mmol/L


 


Chloride  108    (101-111)  mmol/L


 


Carbon Dioxide  23    (21-32)  mmol/L


 


Anion Gap  10.0    (6-13)  


 


BUN  18    (6-20)  mg/dL


 


Creatinine  0.8    (0.6-1.2)  mg/dL


 


Estimated GFR (MDRD)  93    (>89)  


 


Glucose  133 H    ()  mg/dL


 


POC Whole Bld Glucose    132 H  (70 - 100)  mg/dL


 


Calcium  7.4 L    (8.5-10.3)  mg/dL


 


Phosphorus     (2.5-4.6)  mg/dL


 


Magnesium     (1.7-2.8)  mg/dL


 


Blood Type     


 


Antibody Screen     


 


Crossmatch IS Only     














  02/27/19 02/25/19 Range/Units





  20:35 15:57 


 


WBC    (4.8-10.8)  x10^3/uL


 


RBC    (4.70-6.10)  10^6/uL


 


Hgb    (14.0-18.0)  g/dL


 


Hct    (42.0-52.0)  %


 


MCV    (80.0-94.0)  fL


 


MCH    (27.0-31.0)  pg


 


MCHC    (32.0-36.0)  g/dL


 


RDW    (12.0-15.0)  %


 


Plt Count    (130-450)  10^3/uL


 


MPV    (7.4-11.4)  fL


 


Neut # (Auto)    (1.5-6.6)  10^3/uL


 


Lymph # (Auto)    (1.5-3.5)  10^3/uL


 


Mono # (Auto)    (0.0-1.0)  10^3/uL


 


Eos # (Auto)    (0.0-0.7)  10^3/uL


 


Baso # (Auto)    (0.0-0.1)  10^3/uL


 


Absolute Nucleated RBC    x10^3/uL


 


Nucleated RBC %    /100WBC


 


VBG pH    (7.31-7.41)  


 


Ionized Calcium    (1.15-1.33)  mmol/L


 


Sodium    (135-145)  mmol/L


 


Potassium    (3.5-5.0)  mmol/L


 


Chloride    (101-111)  mmol/L


 


Carbon Dioxide    (21-32)  mmol/L


 


Anion Gap    (6-13)  


 


BUN    (6-20)  mg/dL


 


Creatinine    (0.6-1.2)  mg/dL


 


Estimated GFR (MDRD)    (>89)  


 


Glucose    ()  mg/dL


 


POC Whole Bld Glucose  138 H   (70 - 100)  mg/dL


 


Calcium    (8.5-10.3)  mg/dL


 


Phosphorus    (2.5-4.6)  mg/dL


 


Magnesium    (1.7-2.8)  mg/dL


 


Blood Type   Cancelled  


 


Antibody Screen   Cancelled  


 


Crossmatch IS Only   See Detail  














- Current Medications


Current Medications: 





                               Current Medications











Generic Name Dose Route Start Last Admin





  Trade Name Freq  PRN Reason Stop Dose Admin


 


Chlorhexidine Gluconate  15 ml  02/27/19 12:00  02/28/19 09:26





  Peridex  PO   15 ml





  BID LILLIANA   Administration





     





     





     





     


 


Enoxaparin Sodium  40 mg  02/26/19 09:00  02/28/19 09:04





  Lovenox  SUBQ   40 mg





  DAILY LILLIANA   Administration





     





     





     





     


 


Hydromorphone HCl  1 mg  02/25/19 12:38  02/28/19 10:20





  Dilaudid Inj Carp  IVP   1 mg





  Q2HR PRN   Administration





  Pain 8 to 10   





     





     





     


 


Piperacillin Sod/Tazobactam  100 mls @ 25 mls/hr  02/25/19 20:00  02/28/19 12:00





  Sod 4.5 gm/ Sodium Chloride  IV   25 mls/hr





  Q8H LILLIANA   Administration





     





     





     





     


 


Acetaminophen  100 mls @ 400 mls/hr  02/26/19 01:33  02/28/19 06:50





  Ofirmev  IV   Infused





  Q6HR PRN   Infusion





  Pain or T>100.4   





     





     





     


 


Fat Emulsion Intravenous  250 mls @ 21 mls/hr  02/26/19 19:00  02/28/19 07:00





  Intralipid 20%  IV   Infused





  1900 LILLIANA   Infusion





     





     





     





     


 


Sodium Chloride  10 ml  02/25/19 17:00  02/28/19 09:05





  Normal Saline Flush 0.9%  IVP   10 ml





  0100,0900,1700 LILLIANA   Administration





     





     





     





     


 


Sodium Chloride  10 ml  02/25/19 12:38  02/28/19 10:22





  Normal Saline Flush 0.9%  IVP   10 ml





  PRN PRN   Administration





  AS NEEDED PER PROVIDER ORDERS   





     





     





     














- Physical Exam


Wound/Incisions: positive: Dressing dry and intact


General Appearance: positive: No acute distress, Alert


ENT: positive: Dry mucous membranes


Neck: positive: No JVD


Respiratory: positive: Chest non-tender, No respiratory distress, Breath sounds 

nml (anteriorly)


Cardiovascular: positive: Regular rate & rhythm, No murmur, No gallop


Abdomen: positive: No organomegaly, Nml bowel sounds, No distention, Tenderness 

(minimal incisional), Other (ileostomy pink, with some liquid green output)


Skin: positive: No rash, Warm, Dry, Other (2 areas of superficial skin necrosis 

left third toe, no drainage).  negative: Cyanosis


Extremities: positive: Pedal edema.  negative: Calf tenderness





ABX Reporting


Has patient been on IV antibiotics over the past 48 hours?: Yes





Impression/Plan





- Problem List


Problem List: 





Doing remarkably well 3 days s/p surgery for anastomotic leak with intra 

abdominal sepsis and septic shock. All  organ system functions are improving, 

including cardiac, pulmonary, renal, gi, and fluid/electrolytes. Receiving TPN 

and tolerating extubation well.


Rec: D/c NG tube, art line, ross; start clear liquids, OOB, ow per 

hospitalists.

## 2019-02-28 NOTE — PROVIDER PROGRESS NOTE
Assessment/Plan





- Problem List


(1) Perforated abdominal viscus


Assessment/Plan: 


When OK with surgery, ng out, advance diet, Almeida out and OOB to chair planned.


Ileostomy teaching tomorrow planned, since he is fatigued and falls asleep 

today.


Continue antibiotics for stool found in peritonium at surgery. Today is POD #3








(2) Anemia


Assessment/Plan: 


Hgb up to 10, after transfusion yesterday.


Follow CBC daily.








(3) Black toe


Assessment/Plan: 


resolving.


No TEDS or SCDs on due to edema causing compression 2 days ago








(4) Hypokalemia


Assessment/Plan: 


Resolved with replacement








(5) Adenocarcinoma, intestinal type


Assessment/Plan: 


Outpt [facility] clinic and Surgery outpt F/U planned








(6) Septic shock


Assessment/Plan: 


Resolved








- Current Meds


Current Meds: 





                               Current Medications











Generic Name Dose Route Start Last Admin





  Trade Name Freq  PRN Reason Stop Dose Admin


 


Chlorhexidine Gluconate  15 ml  02/27/19 12:00  02/28/19 09:26





  Peridex  PO   15 ml





  BID LILLIANA   Administration





     





     





     





     


 


Enoxaparin Sodium  40 mg  02/26/19 09:00  02/28/19 09:04





  Lovenox  SUBQ   40 mg





  DAILY LILLIANA   Administration





     





     





     





     


 


Hydromorphone HCl  1 mg  02/25/19 12:38  02/28/19 15:06





  Dilaudid Inj Carp  IVP   1 mg





  Q2HR PRN   Administration





  Pain 8 to 10   





     





     





     


 


Piperacillin Sod/Tazobactam  100 mls @ 25 mls/hr  02/25/19 20:00  02/28/19 12:00





  Sod 4.5 gm/ Sodium Chloride  IV   25 mls/hr





  Q8H LILLIANA   Administration





     





     





     





     


 


Acetaminophen  100 mls @ 400 mls/hr  02/26/19 01:33  02/28/19 06:50





  Ofirmev  IV   Infused





  Q6HR PRN   Infusion





  Pain or T>100.4   





     





     





     


 


Fat Emulsion Intravenous  250 mls @ 21 mls/hr  02/26/19 19:00  02/28/19 07:00





  Intralipid 20%  IV   Infused





  1900 LILLIANA   Infusion





     





     





     





     


 


Sodium Chloride  10 ml  02/25/19 17:00  02/28/19 09:05





  Normal Saline Flush 0.9%  IVP   10 ml





  0100,0900,1700 LILLIANA   Administration





     





     





     





     


 


Sodium Chloride  10 ml  02/25/19 12:38  02/28/19 10:22





  Normal Saline Flush 0.9%  IVP   10 ml





  PRN PRN   Administration





  AS NEEDED PER PROVIDER ORDERS   





     





     





     














- Lab Result


Fish Bone Diagrams: 


                                 02/28/19 05:05





                                 02/28/19 05:05





- Additional Planning


My Orders: 





My Active Orders





02/28/19 07:48


hydrALAZINE INJ [Apresoline Inj]   10 mg IVP Q6H PRN 





02/28/19 15:07


Almeida Discontinuation [RC] ONCE 





02/28/19 19:00


Multivitamin [Infuvite] 10 ml Trace Elements V Conc [Multitrace-5 Conc Vial] 1 

ml   TPN (Clinimix E 5/15) [Clinimix E 5%-15% Solution] 2,000 ml IV Q24H 





03/01/19 05:00


COMPREHENSIVE METABOLIC PANEL [CHEM] Routine 


MAGNESIUM [CHEM] Routine 


PHOSPHORUS [CHEM] Routine 


TRIGLYCERIDES [CHEM] Routine 





03/06/19 05:00


CBC - COMP BLD CT W/AUTO DIFF [HEME] Routine 


PT WITH INR [COAG] Routine 














Subjective





- Subjective


Patient Reports: Feeling Better, Resting Comfortably





Objective


Vital Signs: 





                               Vital Signs - 24 hr











  02/27/19 02/27/19 02/27/19





  15:40 15:50 16:00


 


Temperature 37.6 C H 36.5 C 36.5 C


 


Heart Rate 85 79 82


 


Heart Rate [   85





Monitoring   





electrodes]   


 


Respiratory 19 19 18





Rate   


 


Blood Pressure 139/64 H 152/67 H 151/66 H


 


Blood Pressure   132/70 H





[Left Radial   





artery]   


 


Blood Pressure   83/61 L





[Right Brachial   





artery]   


 


O2 Saturation   95














  02/27/19 02/27/19 02/27/19





  17:00 17:10 18:00


 


Temperature 37.5 C 37.5 C 37.5 C


 


Heart Rate  75 


 


Heart Rate [ 74  70





Monitoring   





electrodes]   


 


Respiratory 12 11 L 11 L





Rate   


 


Blood Pressure  140/60 H 


 


Blood Pressure 137/59 H  140/67 H





[Left Radial   





artery]   


 


Blood Pressure 93/73  96/76





[Right Brachial   





artery]   


 


O2 Saturation 96  94














  02/27/19 02/27/19 02/27/19





  19:00 20:00 21:00


 


Temperature 37.6 C H 37.4 C 37.4 C


 


Heart Rate   


 


Heart Rate [ 77 79 71





Monitoring   





electrodes]   


 


Respiratory 13 14 11 L





Rate   


 


Blood Pressure   


 


Blood Pressure 115/95 H 160/68 H 154/68 H





[Left Radial   





artery]   


 


Blood Pressure 94/72 102/75 100/75





[Right Brachial   





artery]   


 


O2 Saturation 100 97 94














  02/27/19 02/27/19 02/28/19





  22:00 23:00 00:00


 


Temperature 37.3 C 37.3 C 37.3 C


 


Heart Rate   


 


Heart Rate [ 77 67 71





Monitoring   





electrodes]   


 


Respiratory 23 11 L 11 L





Rate   


 


Blood Pressure   


 


Blood Pressure 167/73 H 153/65 H 159/67 H





[Left Radial   





artery]   


 


Blood Pressure 109/81 H 103/68 110/75





[Right Brachial   





artery]   


 


O2 Saturation 98 97 99














  02/28/19 02/28/19 02/28/19





  01:00 02:00 03:00


 


Temperature 37.2 C 37.2 C 37.1 C


 


Heart Rate   


 


Heart Rate [ 68 69 76





Monitoring   





electrodes]   


 


Respiratory 9 L 11 L 12





Rate   


 


Blood Pressure   


 


Blood Pressure 164/69 H 164/69 H 123/71





[Left Radial   





artery]   


 


Blood Pressure 108/77 113/82 H 118/84 H





[Right Brachial   





artery]   


 


O2 Saturation 97 98 96














  02/28/19 02/28/19 02/28/19





  04:00 05:00 06:00


 


Temperature 37.0 C 37.1 C 37.2 C


 


Heart Rate   


 


Heart Rate [ 71 76 77





Monitoring   





electrodes]   


 


Respiratory 8 L 15 13





Rate   


 


Blood Pressure   


 


Blood Pressure 160/73 H 160/74 H 160/72 H





[Left Radial   





artery]   


 


Blood Pressure 105/82 H 115/88 H 108/83 H





[Right Brachial   





artery]   


 


O2 Saturation 98 97 98














  02/28/19 02/28/19 02/28/19





  06:58 08:00 09:00


 


Temperature 37.1 C 36.8 C 36.8 C


 


Heart Rate   


 


Heart Rate [ 73 82 77





Monitoring   





electrodes]   


 


Respiratory 11 L 22 21





Rate   


 


Blood Pressure   


 


Blood Pressure 148/66 H 153/76 H 159/75 H





[Left Radial   





artery]   


 


Blood Pressure 107/78 116/88 H 115/87 H





[Right Brachial   





artery]   


 


O2 Saturation 97 95 97














  02/28/19 02/28/19 02/28/19





  10:00 11:00 12:00


 


Temperature 36.8 C 37 C 37 C


 


Heart Rate   


 


Heart Rate [ 72 73 68





Monitoring   





electrodes]   


 


Respiratory 19 11 L 12





Rate   


 


Blood Pressure   


 


Blood Pressure 160/74 H 161/70 H 167/73 H





[Left Radial   





artery]   


 


Blood Pressure 112/81 H 116/90 H 119/85 H





[Right Brachial   





artery]   


 


O2 Saturation 96 96 96














  02/28/19 02/28/19 02/28/19





  13:00 14:00 15:00


 


Temperature 37 C 37.1 C 37.4 C


 


Heart Rate   


 


Heart Rate [ 80 82 82





Monitoring   





electrodes]   


 


Respiratory 16 21 20





Rate   


 


Blood Pressure   


 


Blood Pressure 177/77 H 179/82 H 





[Left Radial   





artery]   


 


Blood Pressure 116/86 H 125/93 H 91/69





[Right Brachial   





artery]   


 


O2 Saturation 97 100 92








                                     Oxygen











O2 Source [With Activity]      Nasal cannula


 


O2 Source                      Room air














I&O (Last 24 Hrs): 





                          Intake and Output Totals x24h











 02/26/19 02/27/19 02/28/19





 23:59 23:59 23:59


 


Intake Total 5113.372 4714.278 1997.35


 


Output Total 1217 1476 2885


 


Balance 3896.372 3238.278 -887.65











General: Alert, Oriented x3


HEENT: Other (ng in)


Neuro: Non Focal


Cardiovascular: Regular rate


Respiratory: No respiratory distress


Abdomen: Other (Trace bowel sounds)


Extremities: Other (1+ dedema of hands)





- Results


Results: 





                               Laboratory Results











WBC  13.5 x10^3/uL (4.8-10.8)  H  02/28/19  05:05    


 


RBC  3.38 10^6/uL (4.70-6.10)  L  02/28/19  05:05    


 


Hgb  10.3 g/dL (14.0-18.0)  L  02/28/19  05:05    


 


Hct  30.0 % (42.0-52.0)  L  02/28/19  05:05    


 


MCV  88.7 fL (80.0-94.0)   02/28/19  05:05    


 


MCH  30.4 pg (27.0-31.0)   02/28/19  05:05    


 


MCHC  34.2 g/dL (32.0-36.0)   02/28/19  05:05    


 


RDW  16.8 % (12.0-15.0)  H  02/28/19  05:05    


 


Plt Count  269 10^3/uL (130-450)   02/28/19  05:05    


 


MPV  7.4 fL (7.4-11.4)   02/28/19  05:05    


 


Neut # (Auto)  12.1 10^3/uL (1.5-6.6)  H  02/28/19  05:05    


 


Lymph # (Auto)  0.5 10^3/uL (1.5-3.5)  L  02/28/19  05:05    


 


Mono # (Auto)  0.6 10^3/uL (0.0-1.0)   02/28/19  05:05    


 


Eos # (Auto)  0.0 10^3/uL (0.0-0.7)   02/28/19  05:05    


 


Baso # (Auto)  0.2 10^3/uL (0.0-0.1)  H  02/28/19  05:05    


 


Absolute Nucleated RBC  0.00 x10^3/uL  02/28/19  05:05    


 


Total Counted  100   02/25/19  10:59    


 


Band Neuts % (Manual)  7 % (0-10)  02/25/19  10:59    


 


Abnorm Lymph % (Manual)  0 %  02/25/19  10:59    


 


Nucleated RBC %  0.0 /100WBC  02/28/19  05:05    


 


Neutrophils # (Manual)  11.3 10^3/uL (1.5-6.6)  H  02/25/19  10:59    


 


Lymphocytes # (Manual)  0.5 10^3/uL (1.5-3.5)  L  02/25/19  10:59    


 


Monocytes # (Manual)  0.8 10^3/uL (0.0-1.0)   02/25/19  10:59    


 


Eosinophils # (Manual)  0.0 10^3/uL (0-0.7)   02/25/19  10:59    


 


Basophils # (Manual)  0.0 10^3/uL (0-0.1)   02/25/19  10:59    


 


Differential Comment  MANUAL DIFFERENTIAL   02/25/19  10:59    


 


Platelet Estimate  INCREASED (>450,000)  (NORMAL)   02/25/19  10:59    


 


Platelet Morphology  NORMAL APPEARANCE  (NORMAL)   02/25/19  10:59    


 


RBC Morph Micro Appear  NORMAL APPEARANCE  (NORMAL)   02/25/19  10:59    


 


PT  13.2 secs (9.9-12.6)  H  02/27/19  05:00    


 


INR  1.2  (0.8-1.2)   02/27/19  05:00    


 


Bld Gas Analysis Time  1115   02/27/19  11:15    


 


Sample Site  A-LINE   02/27/19  11:15    


 


ABG pH  7.53  (7.35-7.45)  H  02/27/19  11:15    


 


ABG pCO2  30 mmHg (34-45)  L  02/27/19  11:15    


 


ABG pO2  87 mmHg ()   02/27/19  11:15    


 


ABG HCO3  23.8 mmol/L (22.0-26.0)   02/27/19  11:15    


 


ABG Total CO2  24.7 MMOL/L (21.0-29.0)   02/27/19  11:15    


 


ABG O2 Saturation  96 % (94-98)   02/27/19  11:15    


 


ABG Base Excess  1.3 mmol/L (-2.0-3.0)   02/27/19  11:15    


 


Shaq Test  NOT APPLICABLE   02/27/19  11:15    


 


VBG pH  7.459  (7.31-7.41)  H  02/28/19  05:05    


 


Ionized Calcium  1.08 mmol/L (1.15-1.33)  L  02/28/19  05:05    


 


Respiration Rate  18 b/min  02/27/19  11:15    


 


O2 Delivery Device  VENTILATOR   02/27/19  11:15    


 


Vent Mode  CPAP   02/27/19  11:15    


 


FiO2  0.35   02/27/19  11:15    


 


Tidal Volume  470 mL  02/27/19  11:15    


 


PEEP  5 cmH2O  02/27/19  11:15    


 


Pressure Support Vent  10 cmH2O  02/27/19  11:15    


 


Sodium  141 mmol/L (135-145)   02/28/19  05:05    


 


Potassium  3.5 mmol/L (3.5-5.0)   02/28/19  05:05    


 


Chloride  108 mmol/L (101-111)   02/28/19  05:05    


 


Carbon Dioxide  23 mmol/L (21-32)   02/28/19  05:05    


 


Anion Gap  10.0  (6-13)   02/28/19  05:05    


 


BUN  18 mg/dL (6-20)   02/28/19  05:05    


 


Creatinine  0.8 mg/dL (0.6-1.2)   02/28/19  05:05    


 


Estimated GFR (MDRD)  93  (>89)   02/28/19  05:05    


 


Glucose  133 mg/dL ()  H  02/28/19  05:05    


 


POC Whole Bld Glucose  124 mg/dL (70 - 100)  H  02/28/19  12:14    


 


Lactic Acid  1.4 mmol/L (0.5-2.2)   02/25/19  16:35    


 


Calcium  7.4 mg/dL (8.5-10.3)  L  02/28/19  05:05    


 


Ionized Calcium  YES   02/28/19  05:05    


 


Phosphorus  2.5 mg/dL (2.5-4.6)   02/28/19  05:05    


 


Magnesium  2.2 mg/dL (1.7-2.8)   02/28/19  05:05    


 


Total Bilirubin  0.6 mg/dL (0.2-1.0)   02/27/19  05:00    


 


AST  16 IU/L (10-42)   02/27/19  05:00    


 


ALT  11 IU/L (10-60)   02/27/19  05:00    


 


Alkaline Phosphatase  43 IU/L ()   02/27/19  05:00    


 


Total Protein  3.9 g/dL (6.7-8.2)  L  02/27/19  05:00    


 


Albumin  1.3 g/dL (3.2-5.5)  L  02/27/19  05:00    


 


Globulin  2.6 g/dL (2.1-4.2)   02/27/19  05:00    


 


Albumin/Globulin Ratio  0.5  (1.0-2.2)  L  02/27/19  05:00    


 


Prealbumin  3 mg/dL (18-45)  L  02/27/19  05:00    


 


Triglycerides  120 mg/dL (-149)  02/27/19  05:00    


 


Lipase  22 U/L (22-51)   02/25/19  10:59    


 


Urine Color  YELLOW   02/25/19  21:10    


 


Urine Clarity  CLEAR  (CLEAR)   02/25/19  21:10    


 


Urine pH  5.0 PH (5.0-7.5)   02/25/19  21:10    


 


Ur Specific Gravity  1.015  (1.002-1.030)   02/25/19  21:10    


 


Urine Protein  30 mg/dL (NEGATIVE)  H  02/25/19  21:10    


 


Urine Glucose (UA)  NEGATIVE mg/dL (NEGATIVE)   02/25/19  21:10    


 


Urine Ketones  TRACE mg/dL (NEGATIVE)   02/25/19  21:10    


 


Urine Occult Blood  TRACE-LYSE  (NEGATIVE)   02/25/19  21:10    


 


Urine Nitrite  NEGATIVE  (NEGATIVE)   02/25/19  21:10    


 


Urine Bilirubin  NEGATIVE  (NEGATIVE)   02/25/19  21:10    


 


Urine Urobilinogen  0.2 (NORMAL) E.U./dL (NORMAL)   02/25/19  21:10    


 


Ur Leukocyte Esterase  NEGATIVE  (NEGATIVE)   02/25/19  21:10    


 


Urine RBC  0-5 /HPF (0-5)   02/25/19  21:10    


 


Urine WBC  0-3 /HPF (0-3)   02/25/19  21:10    


 


Ur Epithelial Cells  FEW Transitional /HPF (<= Few)   02/25/19  21:10    


 


Ur Squamous Epith Cells  NONE SEEN  (<= Few)   02/25/19  21:10    


 


Urine Bacteria  None Seen /HPF (None Seen)   02/25/19  21:10    


 


Ur Microscopic Review  INDICATED   02/25/19  21:10    


 


Urine Culture Comments  NOT INDICATED   02/25/19  21:10    


 


MRSA Surveill Initial  NEGATIVE  (NEGATIVE)   02/25/19  14:43    


 


Blood Type  O POSITIVE   02/25/19  15:57    


 


Antibody Screen  NEGATIVE   02/25/19  15:57    


 


Crossmatch IS Only  See Detail   02/25/19  15:57    














- Procedures


Procedures: 





Procedures





INCIS W REM OF FORIEGN BODY OR DEV FROM SKIN & SUBCUT TISSUE (05/30/14)


RELEASE SMALL INTESTINE, OPEN APPROACH (02/15/19)


REPAIR SMALL INTESTINE, OPEN APPROACH (02/15/19)


RESECTION OF RIGHT LARGE INTESTINE, OPEN APPROACH (02/15/19)











Sepsis Event Note (H)





- Evaluation


Current Stage of Sepsis: Resolved (resolving)


Possible source of Sepsis: positive: GI tract/intra-abdominal





- Sepsis Criteria


Sepsis Criteria: Recorded Heart Rate greater than 90 bpm, MAP less than 65 mmHg,

SBP less than 90 mmHg, Metabolic: lactate > 2 mmol/L

## 2019-03-01 LAB
ALBUMIN DIAFP-MCNC: 1.5 G/DL (ref 3.2–5.5)
ALBUMIN/GLOB SERPL: 0.5 {RATIO} (ref 1–2.2)
ALP SERPL-CCNC: 65 IU/L (ref 42–121)
ALT SERPL W P-5'-P-CCNC: 10 IU/L (ref 10–60)
ANION GAP SERPL CALCULATED.4IONS-SCNC: 7 MMOL/L (ref 6–13)
AST SERPL W P-5'-P-CCNC: 16 IU/L (ref 10–42)
BASOPHILS NFR BLD AUTO: 0 10^3/UL (ref 0–0.1)
BASOPHILS NFR BLD AUTO: 0.3 %
BILIRUB BLD-MCNC: 0.5 MG/DL (ref 0.2–1)
BUN SERPL-MCNC: 19 MG/DL (ref 6–20)
CALCIUM UR-MCNC: 7.4 MG/DL (ref 8.5–10.3)
CHLORIDE SERPL-SCNC: 107 MMOL/L (ref 101–111)
CO2 SERPL-SCNC: 24 MMOL/L (ref 21–32)
CREAT SERPLBLD-SCNC: 0.9 MG/DL (ref 0.6–1.2)
EOSINOPHIL # BLD AUTO: 0 10^3/UL (ref 0–0.7)
EOSINOPHIL NFR BLD AUTO: 0.6 %
ERYTHROCYTE [DISTWIDTH] IN BLOOD BY AUTOMATED COUNT: 15.6 % (ref 12–15)
GFRSERPLBLD MDRD-ARVRAT: 81 ML/MIN/{1.73_M2} (ref 89–?)
GLOBULIN SER-MCNC: 3.1 G/DL (ref 2.1–4.2)
GLUCOSE SERPL-MCNC: 132 MG/DL (ref 70–100)
HGB UR QL STRIP: 9.4 G/DL (ref 14–18)
LYMPHOCYTES # SPEC AUTO: 0.6 10^3/UL (ref 1.5–3.5)
LYMPHOCYTES NFR BLD AUTO: 8 %
MAGNESIUM SERPL-MCNC: 2.2 MG/DL (ref 1.7–2.8)
MCH RBC QN AUTO: 29.6 PG (ref 27–31)
MCHC RBC AUTO-ENTMCNC: 33.6 G/DL (ref 32–36)
MCV RBC AUTO: 88.2 FL (ref 80–94)
MONOCYTES # BLD AUTO: 0.5 10^3/UL (ref 0–1)
MONOCYTES NFR BLD AUTO: 5.9 %
NEUTROPHILS # BLD AUTO: 6.9 10^3/UL (ref 1.5–6.6)
NEUTROPHILS # SNV AUTO: 8.1 X10^3/UL (ref 4.8–10.8)
NEUTROPHILS NFR BLD AUTO: 85.2 %
PDW BLD AUTO: 7.3 FL (ref 7.4–11.4)
PHOSPHATE BLD-MCNC: 2.9 MG/DL (ref 2.5–4.6)
PLATELET # BLD: 256 10^3/UL (ref 130–450)
PROT SPEC-MCNC: 4.6 G/DL (ref 6.7–8.2)
RBC MAR: 3.18 10^6/UL (ref 4.7–6.1)
SODIUM SERPLBLD-SCNC: 138 MMOL/L (ref 135–145)

## 2019-03-01 RX ADMIN — SODIUM CHLORIDE SCH MLS/HR: 9 INJECTION, SOLUTION INTRAVENOUS at 19:02

## 2019-03-01 RX ADMIN — SODIUM CHLORIDE SCH MLS/HR: 900 INJECTION INTRAVENOUS at 12:34

## 2019-03-01 RX ADMIN — ACETAMINOPHEN PRN MLS/HR: 10 INJECTION, SOLUTION INTRAVENOUS at 12:44

## 2019-03-01 RX ADMIN — SODIUM CHLORIDE SCH MLS/HR: 900 INJECTION INTRAVENOUS at 04:20

## 2019-03-01 RX ADMIN — CHLORHEXIDINE GLUCONATE SCH ML: 1.2 SOLUTION ORAL at 09:56

## 2019-03-01 RX ADMIN — CHLORHEXIDINE GLUCONATE SCH: 1.2 SOLUTION ORAL at 20:12

## 2019-03-01 RX ADMIN — SODIUM CHLORIDE SCH MG: 9 INJECTION, SOLUTION INTRAVENOUS at 06:40

## 2019-03-01 RX ADMIN — OXYCODONE PRN MG: 5 TABLET ORAL at 17:36

## 2019-03-01 RX ADMIN — ACETAMINOPHEN PRN MLS/HR: 10 INJECTION, SOLUTION INTRAVENOUS at 03:50

## 2019-03-01 RX ADMIN — OXYCODONE PRN MG: 5 TABLET ORAL at 08:43

## 2019-03-01 RX ADMIN — ENOXAPARIN SODIUM SCH MG: 100 INJECTION SUBCUTANEOUS at 09:55

## 2019-03-01 RX ADMIN — I.V. FAT EMULSION SCH MLS/HR: 20 EMULSION INTRAVENOUS at 19:03

## 2019-03-01 RX ADMIN — SODIUM CHLORIDE, PRESERVATIVE FREE PRN ML: 5 INJECTION INTRAVENOUS at 05:02

## 2019-03-01 RX ADMIN — SODIUM CHLORIDE, PRESERVATIVE FREE SCH ML: 5 INJECTION INTRAVENOUS at 00:38

## 2019-03-01 RX ADMIN — SODIUM CHLORIDE, PRESERVATIVE FREE SCH: 5 INJECTION INTRAVENOUS at 09:56

## 2019-03-01 RX ADMIN — SODIUM CHLORIDE, PRESERVATIVE FREE PRN ML: 5 INJECTION INTRAVENOUS at 08:42

## 2019-03-01 RX ADMIN — OXYCODONE PRN MG: 5 TABLET ORAL at 12:38

## 2019-03-01 RX ADMIN — SODIUM CHLORIDE, PRESERVATIVE FREE PRN ML: 5 INJECTION INTRAVENOUS at 10:30

## 2019-03-01 RX ADMIN — ACETAMINOPHEN PRN MLS/HR: 10 INJECTION, SOLUTION INTRAVENOUS at 20:03

## 2019-03-01 RX ADMIN — SODIUM CHLORIDE, PRESERVATIVE FREE PRN ML: 5 INJECTION INTRAVENOUS at 13:04

## 2019-03-01 RX ADMIN — SODIUM CHLORIDE, PRESERVATIVE FREE SCH ML: 5 INJECTION INTRAVENOUS at 20:02

## 2019-03-01 RX ADMIN — HYDROMORPHONE HYDROCHLORIDE PRN MG: 1 INJECTION, SOLUTION INTRAMUSCULAR; INTRAVENOUS; SUBCUTANEOUS at 00:38

## 2019-03-01 RX ADMIN — SODIUM CHLORIDE SCH MLS/HR: 900 INJECTION INTRAVENOUS at 20:16

## 2019-03-01 RX ADMIN — OXYCODONE PRN MG: 5 TABLET ORAL at 21:11

## 2019-03-01 NOTE — PROVIDER PROGRESS NOTE
Assessment/Plan





- Problem List


(1) Perforated abdominal viscus


Assessment/Plan: 


Advance diet as tolerated.


Continue empiric iv antibiotics.


There is stool in ieostomy bag.


Ileostomy teaching today.


He will moved out of ICU today.











(2) Anemia


Assessment/Plan: 


Will start oral Iron replacement.


Follow CBC, transfuse PRBCs if <7.








(3) Generalized weakness


Assessment/Plan: 


This is his 3rd admission in 1 month and he has become extremely weak and 

debilitated from bed rest, npo status, poor calorie intake. He was too tired to 

sit more than 2-3 min dangling on side of bed. Normally he was active, [place] 

[place]


Plan-


Continue iv tpn for calories as diet is only slowly advancing.


PT and OT to start today.


He will need a SNF for rehab for strengthening, when ready for DCh from this 

admission. Possible DCh in 2-3 more days, depending on how his GI tract 

progresses.








(4) Adenocarcinoma, intestinal type


Assessment/Plan: 


Oncology outpatient management is planned.








(5) Black toe


Assessment/Plan: 


Resolved








- Current Meds


Current Meds: 





                               Current Medications











Generic Name Dose Route Start Last Admin





  Trade Name Freq  PRN Reason Stop Dose Admin


 


Chlorhexidine Gluconate  15 ml  02/27/19 12:00  03/01/19 09:56





  Peridex  PO   15 ml





  BID LILLIANA   Administration





     





     





     





     


 


Enoxaparin Sodium  40 mg  02/26/19 09:00  03/01/19 09:55





  Lovenox  SUBQ   40 mg





  DAILY LILLIANA   Administration





     





     





     





     


 


Piperacillin Sod/Tazobactam  100 mls @ 25 mls/hr  02/25/19 20:00  03/01/19 08:20





  Sod 4.5 gm/ Sodium Chloride  IV   Infused





  Q8H LILLIANA   Infusion





     





     





     





     


 


Fat Emulsion Intravenous  250 mls @ 21 mls/hr  02/26/19 19:00  03/01/19 07:35





  Intralipid 20%  IV   Infused





  1900 LILLIANA   Infusion





     





     





     





     


 


Multivitamins 10 ml/ Chromium/  2,011 mls @ 55 mls/hr  02/28/19 19:00  03/01/19 

10:30





Copper/Manganese/Seleni/Zn 1  IV   55 mls/hr





ml/ Amino Ac/Electrol/Dextrose  Q24H LILLIANA   Infusion





/Calcium     





     





     





     


 


Oxycodone HCl  2.5 mg  03/01/19 07:00  03/01/19 08:43





  Roxicodone  PO   2.5 mg





  Q4HR PRN   Administration





  PAIN   





     





     





     


 


Pantoprazole Sodium  40 mg  03/01/19 07:00  03/01/19 06:40





  Protonix  IV   40 mg





  QDAC LILLIANA   Administration





     





     





     





     


 


Sodium Chloride  10 ml  02/25/19 17:00  03/01/19 09:56





  Normal Saline Flush 0.9%  IVP   Not Given





  0100,0900,1700 LILLIANA   





     





     





     





     


 


Sodium Chloride  10 ml  02/25/19 12:38  03/01/19 10:30





  Normal Saline Flush 0.9%  IVP   10 ml





  PRN PRN   Administration





  AS NEEDED PER PROVIDER ORDERS   





     





     





     














- Lab Result


Fish Bone Diagrams: 


                                 03/01/19 08:09





                                 03/01/19 05:00





- Additional Planning


My Orders: 





My Active Orders





02/28/19 18:23


Incentive Spirometry - RT [RC] .tid 





02/28/19 19:00


Multivitamin [Infuvite] 10 ml Trace Elements V Conc [Multitrace-5 Conc Vial] 1 

ml   TPN (Clinimix E 5/15) [Clinimix E 5%-15% Solution] 2,000 ml IV Q24H 





03/01/19 09:11


Transfer [Admit \ Transfer \ Status] [RC] .ONCE 





03/01/19 09:12


Vital Signs [RC] Q4HR 





03/06/19 05:00


CBC - COMP BLD CT W/AUTO DIFF [HEME] Routine 


PT WITH INR [COAG] Routine 














Subjective





- Subjective


Patient Reports: Feeling Better


Nursing Reports: Other (Fatigued after dangling at side of bed for 2-3 min, 

needed to lay down into bed.)





Objective


Vital Signs: 





                               Vital Signs - 24 hr











  02/28/19 02/28/19 02/28/19





  11:00 12:00 13:00


 


Temperature 37 C 37 C 37 C


 


Heart Rate   


 


Heart Rate [ 73 68 80





Monitoring   





electrodes]   


 


Respiratory 11 L 12 16





Rate   


 


Blood Pressure 161/70 H 167/73 H 177/77 H





[Left Radial   





artery]   


 


Blood Pressure 116/90 H 119/85 H 116/86 H





[Right Brachial   





artery]   


 


O2 Saturation 96 96 97














  02/28/19 02/28/19 02/28/19





  14:00 15:00 16:00


 


Temperature 37.1 C 37.4 C 37.4 C


 


Heart Rate   


 


Heart Rate [ 82 82 92





Monitoring   





electrodes]   


 


Respiratory 21 20 16





Rate   


 


Blood Pressure 179/82 H  





[Left Radial   





artery]   


 


Blood Pressure 125/93 H 91/69 118/89 H





[Right Brachial   





artery]   


 


O2 Saturation 100 92 94














  02/28/19 02/28/19 02/28/19





  17:00 18:00 18:15


 


Temperature 37.6 C H 37.7 C H 37.7 C H


 


Heart Rate   86


 


Heart Rate [ 96 86 





Monitoring   





electrodes]   


 


Respiratory 21 18 18





Rate   


 


Blood Pressure   





[Left Radial   





artery]   


 


Blood Pressure 116/83 H 100/79 





[Right Brachial   





artery]   


 


O2 Saturation 94 96 96














  02/28/19 02/28/19 02/28/19





  19:00 20:00 21:00


 


Temperature 37.8 C H 37.7 C H 37.4 C


 


Heart Rate   


 


Heart Rate [ 77 78 66





Monitoring   





electrodes]   


 


Respiratory 15 18 12





Rate   


 


Blood Pressure   





[Left Radial   





artery]   


 


Blood Pressure 116/87 H 100/80 100/84 H





[Right Brachial   





artery]   


 


O2 Saturation 94 95 96














  02/28/19 02/28/19 03/01/19





  22:00 23:00 00:00


 


Temperature 37.1 C  36.9 C


 


Heart Rate   


 


Heart Rate [ 75 71 81





Monitoring   





electrodes]   


 


Respiratory 19 15 19





Rate   


 


Blood Pressure   





[Left Radial   





artery]   


 


Blood Pressure 116/90 H 118/81 H 114/80





[Right Brachial   





artery]   


 


O2 Saturation 96 93 94














  03/01/19 03/01/19 03/01/19





  01:00 02:00 03:00


 


Temperature   


 


Heart Rate   


 


Heart Rate [ 65 72 70





Monitoring   





electrodes]   


 


Respiratory 11 L 10 L 11 L





Rate   


 


Blood Pressure   





[Left Radial   





artery]   


 


Blood Pressure 117/84 H 101/81 H 99/77





[Right Brachial   





artery]   


 


O2 Saturation 95 95 96














  03/01/19 03/01/19 03/01/19





  04:00 05:00 06:00


 


Temperature 36.6 C  


 


Heart Rate   


 


Heart Rate [ 72 75 78





Monitoring   





electrodes]   


 


Respiratory 11 L 14 17





Rate   


 


Blood Pressure   





[Left Radial   





artery]   


 


Blood Pressure 108/83 H 110/83 H 106/84 H





[Right Brachial   





artery]   


 


O2 Saturation 95 95 94














  03/01/19 03/01/19





  08:00 09:00


 


Temperature 36.6 C 


 


Heart Rate  


 


Heart Rate [ 74 85





Monitoring  





electrodes]  


 


Respiratory 17 20





Rate  


 


Blood Pressure  





[Left Radial  





artery]  


 


Blood Pressure 116/85 H 107/91 H





[Right Brachial  





artery]  


 


O2 Saturation 95 98








                                     Oxygen











O2 Source [With Activity]      Nasal cannula


 


O2 Source                      Room air














I&O (Last 24 Hrs): 





                          Intake and Output Totals x24h











 02/27/19 02/28/19 03/01/19





 23:59 23:59 23:59


 


Intake Total 4714.278 2718.933 1572.500


 


Output Total 1476 3835 1395


 


Balance 3238.278 -1116.067 177.500











General: Alert, Oriented x3


HEENT: Mucous membr. moist/pink


Neck: Supple


Neuro: Non Focal


Cardiovascular: No murmurs


Respiratory: No respiratory distress


Abdomen: Soft


Extremities: Other (1+ hand edema)





- Results


Results: 





                               Laboratory Results











WBC  8.1 x10^3/uL (4.8-10.8)   03/01/19  08:09    


 


RBC  3.18 10^6/uL (4.70-6.10)  L  03/01/19  08:09    


 


Hgb  9.4 g/dL (14.0-18.0)  L  03/01/19  08:09    


 


Hct  28.0 % (42.0-52.0)  L  03/01/19  08:09    


 


MCV  88.2 fL (80.0-94.0)   03/01/19  08:09    


 


MCH  29.6 pg (27.0-31.0)   03/01/19  08:09    


 


MCHC  33.6 g/dL (32.0-36.0)   03/01/19  08:09    


 


RDW  15.6 % (12.0-15.0)  H  03/01/19  08:09    


 


Plt Count  256 10^3/uL (130-450)   03/01/19  08:09    


 


MPV  7.3 fL (7.4-11.4)  L  03/01/19  08:09    


 


Neut # (Auto)  6.9 10^3/uL (1.5-6.6)  H  03/01/19  08:09    


 


Lymph # (Auto)  0.6 10^3/uL (1.5-3.5)  L  03/01/19  08:09    


 


Mono # (Auto)  0.5 10^3/uL (0.0-1.0)   03/01/19  08:09    


 


Eos # (Auto)  0.0 10^3/uL (0.0-0.7)   03/01/19  08:09    


 


Baso # (Auto)  0.0 10^3/uL (0.0-0.1)   03/01/19  08:09    


 


Absolute Nucleated RBC  0.00 x10^3/uL  03/01/19  08:09    


 


Total Counted  100   02/25/19  10:59    


 


Band Neuts % (Manual)  7 % (0-10)  02/25/19  10:59    


 


Abnorm Lymph % (Manual)  0 %  02/25/19  10:59    


 


Nucleated RBC %  0.0 /100WBC  03/01/19  08:09    


 


Neutrophils # (Manual)  11.3 10^3/uL (1.5-6.6)  H  02/25/19  10:59    


 


Lymphocytes # (Manual)  0.5 10^3/uL (1.5-3.5)  L  02/25/19  10:59    


 


Monocytes # (Manual)  0.8 10^3/uL (0.0-1.0)   02/25/19  10:59    


 


Eosinophils # (Manual)  0.0 10^3/uL (0-0.7)   02/25/19  10:59    


 


Basophils # (Manual)  0.0 10^3/uL (0-0.1)   02/25/19  10:59    


 


Differential Comment  MANUAL DIFFERENTIAL   02/25/19  10:59    


 


Platelet Estimate  INCREASED (>450,000)  (NORMAL)   02/25/19  10:59    


 


Platelet Morphology  NORMAL APPEARANCE  (NORMAL)   02/25/19  10:59    


 


RBC Morph Micro Appear  NORMAL APPEARANCE  (NORMAL)   02/25/19  10:59    


 


PT  13.2 secs (9.9-12.6)  H  02/27/19  05:00    


 


INR  1.2  (0.8-1.2)   02/27/19  05:00    


 


Bld Gas Analysis Time  1115   02/27/19  11:15    


 


Sample Site  A-LINE   02/27/19  11:15    


 


ABG pH  7.53  (7.35-7.45)  H  02/27/19  11:15    


 


ABG pCO2  30 mmHg (34-45)  L  02/27/19  11:15    


 


ABG pO2  87 mmHg ()   02/27/19  11:15    


 


ABG HCO3  23.8 mmol/L (22.0-26.0)   02/27/19  11:15    


 


ABG Total CO2  24.7 MMOL/L (21.0-29.0)   02/27/19  11:15    


 


ABG O2 Saturation  96 % (94-98)   02/27/19  11:15    


 


ABG Base Excess  1.3 mmol/L (-2.0-3.0)   02/27/19  11:15    


 


Shaq Test  NOT APPLICABLE   02/27/19  11:15    


 


VBG pH  7.459  (7.31-7.41)  H  02/28/19  05:05    


 


Ionized Calcium  1.08 mmol/L (1.15-1.33)  L  02/28/19  05:05    


 


Respiration Rate  18 b/min  02/27/19  11:15    


 


O2 Delivery Device  VENTILATOR   02/27/19  11:15    


 


Vent Mode  CPAP   02/27/19  11:15    


 


FiO2  0.35   02/27/19  11:15    


 


Tidal Volume  470 mL  02/27/19  11:15    


 


PEEP  5 cmH2O  02/27/19  11:15    


 


Pressure Support Vent  10 cmH2O  02/27/19  11:15    


 


Sodium  138 mmol/L (135-145)   03/01/19  05:00    


 


Potassium  3.8 mmol/L (3.5-5.0)   03/01/19  05:00    


 


Chloride  107 mmol/L (101-111)   03/01/19  05:00    


 


Carbon Dioxide  24 mmol/L (21-32)   03/01/19  05:00    


 


Anion Gap  7.0  (6-13)   03/01/19  05:00    


 


BUN  19 mg/dL (6-20)   03/01/19  05:00    


 


Creatinine  0.9 mg/dL (0.6-1.2)   03/01/19  05:00    


 


Estimated GFR (MDRD)  81  (>89)  L  03/01/19  05:00    


 


Glucose  132 mg/dL ()  H  03/01/19  05:00    


 


POC Whole Bld Glucose  139 mg/dL (70 - 100)  H  03/01/19  07:28    


 


Lactic Acid  1.4 mmol/L (0.5-2.2)   02/25/19  16:35    


 


Calcium  7.4 mg/dL (8.5-10.3)  L  03/01/19  05:00    


 


Ionized Calcium  YES   02/28/19  05:05    


 


Phosphorus  2.9 mg/dL (2.5-4.6)   03/01/19  05:00    


 


Magnesium  2.2 mg/dL (1.7-2.8)   03/01/19  05:00    


 


Total Bilirubin  0.5 mg/dL (0.2-1.0)   03/01/19  05:00    


 


AST  16 IU/L (10-42)   03/01/19  05:00    


 


ALT  10 IU/L (10-60)   03/01/19  05:00    


 


Alkaline Phosphatase  65 IU/L ()   03/01/19  05:00    


 


Total Protein  4.6 g/dL (6.7-8.2)  L  03/01/19  05:00    


 


Albumin  1.5 g/dL (3.2-5.5)  L  03/01/19  05:00    


 


Globulin  3.1 g/dL (2.1-4.2)   03/01/19  05:00    


 


Albumin/Globulin Ratio  0.5  (1.0-2.2)  L  03/01/19  05:00    


 


Prealbumin  3 mg/dL (18-45)  L  02/27/19  05:00    


 


Triglycerides  124 mg/dL (-149)  03/01/19  05:00    


 


Lipase  22 U/L (22-51)   02/25/19  10:59    


 


Urine Color  YELLOW   02/25/19  21:10    


 


Urine Clarity  CLEAR  (CLEAR)   02/25/19  21:10    


 


Urine pH  5.0 PH (5.0-7.5)   02/25/19  21:10    


 


Ur Specific Gravity  1.015  (1.002-1.030)   02/25/19  21:10    


 


Urine Protein  30 mg/dL (NEGATIVE)  H  02/25/19  21:10    


 


Urine Glucose (UA)  NEGATIVE mg/dL (NEGATIVE)   02/25/19  21:10    


 


Urine Ketones  TRACE mg/dL (NEGATIVE)   02/25/19  21:10    


 


Urine Occult Blood  TRACE-LYSE  (NEGATIVE)   02/25/19  21:10    


 


Urine Nitrite  NEGATIVE  (NEGATIVE)   02/25/19  21:10    


 


Urine Bilirubin  NEGATIVE  (NEGATIVE)   02/25/19  21:10    


 


Urine Urobilinogen  0.2 (NORMAL) E.U./dL (NORMAL)   02/25/19  21:10    


 


Ur Leukocyte Esterase  NEGATIVE  (NEGATIVE)   02/25/19  21:10    


 


Urine RBC  0-5 /HPF (0-5)   02/25/19  21:10    


 


Urine WBC  0-3 /HPF (0-3)   02/25/19  21:10    


 


Ur Epithelial Cells  FEW Transitional /HPF (<= Few)   02/25/19  21:10    


 


Ur Squamous Epith Cells  NONE SEEN  (<= Few)   02/25/19  21:10    


 


Urine Bacteria  None Seen /HPF (None Seen)   02/25/19  21:10    


 


Ur Microscopic Review  INDICATED   02/25/19  21:10    


 


Urine Culture Comments  NOT INDICATED   02/25/19  21:10    


 


MRSA Surveill Initial  NEGATIVE  (NEGATIVE)   02/25/19  14:43    


 


Blood Type  O POSITIVE   02/25/19  15:57    


 


Antibody Screen  NEGATIVE   02/25/19  15:57    


 


Crossmatch IS Only  See Detail   02/25/19  15:57    














- Procedures


Procedures: 





Procedures





INCIS W REM OF FORIEGN BODY OR DEV FROM SKIN & SUBCUT TISSUE (05/30/14)


RELEASE SMALL INTESTINE, OPEN APPROACH (02/15/19)


REPAIR SMALL INTESTINE, OPEN APPROACH (02/15/19)


RESECTION OF RIGHT LARGE INTESTINE, OPEN APPROACH (02/15/19)











Sepsis Event Note (H)





- Evaluation


Current Stage of Sepsis: Resolved (resolving)


Possible source of Sepsis: positive: GI tract/intra-abdominal





- Sepsis Criteria


Sepsis Criteria: Recorded Heart Rate greater than 90 bpm, MAP less than 65 mmHg,

SBP less than 90 mmHg, Metabolic: lactate > 2 mmol/L

## 2019-03-02 RX ADMIN — I.V. FAT EMULSION SCH MLS/HR: 20 EMULSION INTRAVENOUS at 19:35

## 2019-03-02 RX ADMIN — ACETAMINOPHEN PRN MLS/HR: 10 INJECTION, SOLUTION INTRAVENOUS at 08:16

## 2019-03-02 RX ADMIN — SODIUM CHLORIDE SCH MLS/HR: 9 INJECTION, SOLUTION INTRAVENOUS at 19:13

## 2019-03-02 RX ADMIN — OXYCODONE PRN MG: 5 TABLET ORAL at 01:06

## 2019-03-02 RX ADMIN — OXYCODONE PRN MG: 5 TABLET ORAL at 06:10

## 2019-03-02 RX ADMIN — CHLORHEXIDINE GLUCONATE SCH ML: 1.2 SOLUTION ORAL at 08:20

## 2019-03-02 RX ADMIN — SODIUM CHLORIDE, PRESERVATIVE FREE PRN ML: 5 INJECTION INTRAVENOUS at 04:19

## 2019-03-02 RX ADMIN — Medication SCH MG: at 18:54

## 2019-03-02 RX ADMIN — SODIUM CHLORIDE, PRESERVATIVE FREE SCH: 5 INJECTION INTRAVENOUS at 17:21

## 2019-03-02 RX ADMIN — SODIUM CHLORIDE, PRESERVATIVE FREE SCH ML: 5 INJECTION INTRAVENOUS at 08:21

## 2019-03-02 RX ADMIN — ENOXAPARIN SODIUM SCH MG: 100 INJECTION SUBCUTANEOUS at 08:21

## 2019-03-02 RX ADMIN — SODIUM CHLORIDE, PRESERVATIVE FREE SCH ML: 5 INJECTION INTRAVENOUS at 01:07

## 2019-03-02 RX ADMIN — ACETAMINOPHEN PRN MLS/HR: 10 INJECTION, SOLUTION INTRAVENOUS at 17:07

## 2019-03-02 RX ADMIN — OXYCODONE PRN MG: 5 TABLET ORAL at 10:55

## 2019-03-02 RX ADMIN — SODIUM CHLORIDE SCH MG: 9 INJECTION, SOLUTION INTRAVENOUS at 06:14

## 2019-03-02 RX ADMIN — SODIUM CHLORIDE SCH MLS/HR: 900 INJECTION INTRAVENOUS at 04:19

## 2019-03-02 RX ADMIN — OXYCODONE PRN MG: 5 TABLET ORAL at 15:28

## 2019-03-02 RX ADMIN — SODIUM CHLORIDE SCH MLS/HR: 900 INJECTION INTRAVENOUS at 12:34

## 2019-03-02 NOTE — PROVIDER PROGRESS NOTE
Subjective





- General


Admit Date: 02/25/19


Procedure Date: 02/25/19


Post Op Days: 5


Procedure Performed: Partial colectomy with end ileostomy, Hartmanns for 

perforation/sepsis





- Review of Systems


Wound/Incisions: positive: Healing well


General: positive: No symptoms (no n/v, dyspnea; minimal incisional pain, no 

foot pain), Appetite (hungry)


Pulmonary: positive: No symptoms, Shortness of breath, Pleuritic chest pain


Cardiovascular: positive: No symptoms


Gastrointestinal: positive: Abdominal pain (Minimal to none.).  negative: 

Nausea, Vomiting


Skin: positive: No symptoms


Psychiatric: positive: No symptoms





Objective





- Patient Data


Reviewed Vital Signs: Yes


Vital Signs: 





                                Vital Signs x48h











  Temp Pulse Pulse Resp BP BP Pulse Ox


 


 03/02/19 15:49  36.6 C   84  20   138/79 H  99


 


 03/02/19 11:22  36.4 C L   84  20   115/70  95


 


 03/02/19 11:04   81    153/86 H  


 


 03/02/19 08:28  36.4 C L   87  18   153/86 H  96














  Pulse Ox


 


 03/02/19 15:49 


 


 03/02/19 11:22 


 


 03/02/19 11:04  94


 


 03/02/19 08:28 











Weight: 





                                     Weight











 02/28/19 03/01/19 03/02/19





 23:59 23:59 23:59


 


Weight (kg) 60.1 kg 60 kg 60 kg











Intake & Output: 





                          Intake and Output Totals x24h











 02/28/19 03/01/19 03/02/19





 23:59 23:59 23:59


 


Intake Total 2718.933 2448.833 1270


 


Output Total 3835 2630 1425


 


Balance -1116.067 -181.167 -155














- Lab Results


Lab Results: 


                                 03/01/19 08:09





                                 03/01/19 05:00


Other Lab Results: 





                               Lab Results x24hrs











  03/02/19 03/02/19 03/02/19 Range/Units





  15:15 11:13 06:15 


 


POC Whole Bld Glucose   148 H  135 H  (70 - 100)  mg/dL


 


B-Natriuretic Peptide  199 H    (5-100)  pg/mL














  03/01/19 03/01/19 Range/Units





  23:36 16:43 


 


POC Whole Bld Glucose  160 H  128 H  (70 - 100)  mg/dL


 


B-Natriuretic Peptide    (5-100)  pg/mL














- Current Medications


Current Medications: 





                               Current Medications











Generic Name Dose Route Start Last Admin





  Trade Name Freq  PRN Reason Stop Dose Admin


 


Enoxaparin Sodium  40 mg  02/26/19 09:00  03/02/19 08:21





  Lovenox  SUBQ   40 mg





  DAILY LILLIANA   Administration





     





     





     





     


 


Piperacillin Sod/Tazobactam  100 mls @ 25 mls/hr  02/25/19 20:00  03/02/19 12:34





  Sod 4.5 gm/ Sodium Chloride  IV   25 mls/hr





  Q8H LILLIANA   Administration





     





     





     





     


 


Fat Emulsion Intravenous  250 mls @ 21 mls/hr  02/26/19 19:00  03/02/19 06:58





  Intralipid 20%  IV   Infused





  1900 LILLIANA   Infusion





     





     





     





     


 


Multivitamins 10 ml/ Chromium/  2,011 mls @ 65 mls/hr  02/28/19 19:00  03/01/19 

19:02





Copper/Manganese/Seleni/Zn 1  IV   65 mls/hr





ml/ Amino Ac/Electrol/Dextrose  Q24H LILLIANA   Administration





/Calcium     





     





     





     


 


Acetaminophen  100 mls @ 400 mls/hr  03/01/19 07:01  03/02/19 08:31





  Ofirmev  IV   Infused





  Q8H PRN   Infusion





  Pain or T>100.4   





     





     





     


 


Mineral Oil  1 applic  03/01/19 13:59  03/01/19 14:17





  Cavilon  TOP   1 applic





  PRN PRN   Administration





  Skin Care   





     





     





     


 


Oxycodone HCl  2.5 mg  03/01/19 07:00  03/02/19 15:28





  Roxicodone  PO   2.5 mg





  Q4HR PRN   Administration





  PAIN   





     





     





     


 


Pantoprazole Sodium  40 mg  03/01/19 07:00  03/02/19 06:14





  Protonix  IV   40 mg





  QDAC LILLIANA   Administration





     





     





     





     


 


Sodium Chloride  10 ml  02/25/19 17:00  03/02/19 08:21





  Normal Saline Flush 0.9%  IVP   10 ml





  0100,0900,1700 LILLIANA   Administration





     





     





     





     


 


Sodium Chloride  10 ml  02/25/19 12:38  03/02/19 04:19





  Normal Saline Flush 0.9%  IVP   10 ml





  PRN PRN   Administration





  AS NEEDED PER PROVIDER ORDERS   





     





     





     














- Physical Exam


Wound/Incisions: positive: Healing well


General Appearance: positive: No acute distress


Eyes Bilateral: positive: No lid inflammation, Conjunctivae nml, No scleral 

icterus, Other (LEFT pupil is deformed and large, nose previously fractured)


ENT: positive: No signs of dehydration


Neck: positive: Trachea midline


Respiratory: positive: Chest non-tender, No respiratory distress, Breath sounds 

nml


Cardiovascular: positive: Regular rate & rhythm


Abdomen: positive: Non-tender, Other (Ileostomy pink and productive.  Midline 

wound healing well without evidence of infection - no erythema.  No ecchymosis.)


Skin: positive: Color nml


Extremities: positive: Non-tender, Nml appearance


Neurologic/Psychiatric: positive: Oriented x3, Motor nml, Sensation nml, 

Mood/affect nml (Mood is excellent.)





ABX Reporting


Has patient been on IV antibiotics over the past 48 hours?: Yes





Impression/Plan





- Problem List


Problem List: 


D5 s/p exploratory laparotomy with partial colectomy, end ileostomy and Goode

ns for perforation and consequent fecal peritonitis and sepsis





1)  FEN      Tolerating diet well.  Well educated regarding diet and need for 

increased caloric intake.  Increase protein intake to aid with healing.





2)  Almeida      Would like to remove as soon as possible to decrease likelihood 

of infection-planned for tomorrow.





3)  Activity      Aggressive OT and PT recommended and already on case.





4)  ID      Patient is still on IV antibiotics - with normalization of WBC can 

consider switch to oral agents.  Patient IS at risk for infection considering 

the fecal peritonitis that he presented with.





5)  Ostomy      Te[place] is ongoing[place] [facility] [place]





6)  Follow up      Patient will definitely follow up with us (Dr. Almeida) in 

office following discharge.

## 2019-03-02 NOTE — XRAY REPORT
Reason:  cough

Procedure Date:  03/02/2019   

Accession Number:  [id number] / [id number]                    

Procedure:  XR  - Chest 1 View X-Ray CPT Code:  36430

 

FULL RESULT:

 

 

EXAM:

CHEST RADIOGRAPHY

 

EXAM DATE: 3/2/2019 01:20 PM.

 

CLINICAL HISTORY: COUGH.

 

COMPARISON: CHEST 1 VIEW 02/26/2019 1:09 PM

ABDOMEN/PELVIS W/ 02/25/2019 11:36 AM

CHEST 2 VIEW 02/25/2019 1:58 PM.

 

TECHNIQUE: 1 view.

 

FINDINGS:

Lungs/Pleura: Small bilateral pleural effusions and right basilar 

atelectasis is again seen. There is some increase in effusion and 

atelectasis suggested on the right side. There is no pneumothorax or 

vascular congestion.

 

Mediastinum: Within exam limitations, the cardiomediastinal contour is 

normal.

 

Other: Left-sided central line terminates in the mid SVC.

 

IMPRESSION: Minimal to mild bilateral pleural effusions and atelectasis 

with slight increase on the right side compared with 02/26/2019 exam.

 

RADIA

## 2019-03-02 NOTE — PROVIDER PROGRESS NOTE
Assessment/Plan





- Problem List


(1) Perforated abdominal viscus


Assessment/Plan: 


Continue prn pain meds, POD #4


advance diet to pureed


cintinue empiric antibiotics


increase activity, OOB to chair and PT








(2) Cough


Assessment/Plan: 


Obtain CXR, sputum culture, BNP


IS already ordered, will ask RN to check if he is doing it








(3) Anemia


Assessment/Plan: 


Will begin Ferrous Gluc replacement


Follow CBC








(4) Generalized weakness


Assessment/Plan: 


Contiue PT








(5) Urinary retention


Assessment/Plan: 


The Almeida needed to be restarted yesterday, due to retention.


Will start po Flomax tomorrow and plan to discontinue Almeida tomorrow.








(6) Malnutrition following gastrointestinal surgery


Assessment/Plan: 


He has been getting tpn for about 5 days.


Fingerstick glu checks are running 150's average.


Will check a BNP and OK to stop fingerstick checks.








(7) Adenocarcinoma, intestinal type


Assessment/Plan: 


Outpatient MAC oncol planned








- Current Meds


Current Meds: 





                               Current Medications











Generic Name Dose Route Start Last Admin





  Trade Name Freq  PRN Reason Stop Dose Admin


 


Chlorhexidine Gluconate  15 ml  02/27/19 12:00  03/02/19 08:20





  Peridex  PO   15 ml





  BID LILLIANA   Administration





     





     





     





     


 


Enoxaparin Sodium  40 mg  02/26/19 09:00  03/02/19 08:21





  Lovenox  SUBQ   40 mg





  DAILY LILLIANA   Administration





     





     





     





     


 


Piperacillin Sod/Tazobactam  100 mls @ 25 mls/hr  02/25/19 20:00  03/02/19 12:34





  Sod 4.5 gm/ Sodium Chloride  IV   25 mls/hr





  Q8H LILLIANA   Administration





     





     





     





     


 


Fat Emulsion Intravenous  250 mls @ 21 mls/hr  02/26/19 19:00  03/02/19 06:58





  Intralipid 20%  IV   Infused





  1900 LILLIANA   Infusion





     





     





     





     


 


Multivitamins 10 ml/ Chromium/  2,011 mls @ 65 mls/hr  02/28/19 19:00  03/01/19 

19:02





Copper/Manganese/Seleni/Zn 1  IV   65 mls/hr





ml/ Amino Ac/Electrol/Dextrose  Q24H LILLIANA   Administration





/Calcium     





     





     





     


 


Acetaminophen  100 mls @ 400 mls/hr  03/01/19 07:01  03/02/19 08:31





  Ofirmev  IV   Infused





  Q8H PRN   Infusion





  Pain or T>100.4   





     





     





     


 


Mineral Oil  1 applic  03/01/19 13:59  03/01/19 14:17





  Cavilon  TOP   1 applic





  PRN PRN   Administration





  Skin Care   





     





     





     


 


Oxycodone HCl  2.5 mg  03/01/19 07:00  03/02/19 10:55





  Roxicodone  PO   2.5 mg





  Q4HR PRN   Administration





  PAIN   





     





     





     


 


Pantoprazole Sodium  40 mg  03/01/19 07:00  03/02/19 06:14





  Protonix  IV   40 mg





  QDAC LILLIANA   Administration





     





     





     





     


 


Sodium Chloride  10 ml  02/25/19 17:00  03/02/19 08:21





  Normal Saline Flush 0.9%  IVP   10 ml





  0100,0900,1700 LILLIANA   Administration





     





     





     





     


 


Sodium Chloride  10 ml  02/25/19 12:38  03/02/19 04:19





  Normal Saline Flush 0.9%  IVP   10 ml





  PRN PRN   Administration





  AS NEEDED PER PROVIDER ORDERS   





     





     





     














- Lab Result


Fish Bone Diagrams: 


                                 03/01/19 08:09





                                 03/01/19 05:00





- Additional Planning


My Orders: 





My Active Orders





03/01/19 13:54


Almeida Insertion [RC] QSHIFT 





03/01/19 13:59


Min Oil/Dimeth/Coconut Oil Crm [Cavilon]   1 applic TOP PRN PRN 





03/02/19


Evaluate and Treat OT [OT] Routine 


Evaluate and Treat PT [PT] Routine 





03/06/19 05:00


CBC - COMP BLD CT W/AUTO DIFF [HEME] Routine 


PT WITH INR [COAG] Routine 














Subjective





- Subjective


Patient Reports: Resting Comfortably


Nursing Reports: Cough, Other (He seems to be grimacing in pain but rates his 

pain 4/10)





Objective


Vital Signs: 





                               Vital Signs - 24 hr











  03/01/19 03/01/19 03/02/19





  15:36 19:49 00:04


 


Temperature 36.5 C 36.4 C L 36.4 C L


 


Heart Rate [   





Activity]   


 


Heart Rate [   65





Brachial]   


 


Heart Rate [ 69 73 





Monitoring   





electrodes]   


 


Respiratory 16 24 18





Rate   


 


Blood Pressure   





[Activity]   


 


Blood Pressure 152/81 H 146/89 H 158/78 H





[Left Brachial   





artery]   


 


O2 Saturation 97 99 95


 


O2 Saturation [   





With Activity]   














  03/02/19 03/02/19 03/02/19





  04:27 08:28 11:04


 


Temperature 36.4 C L 36.4 C L 


 


Heart Rate [   81





Activity]   


 


Heart Rate [ 80 87 





Brachial]   


 


Heart Rate [   





Monitoring   





electrodes]   


 


Respiratory 16 18 





Rate   


 


Blood Pressure   153/86 H





[Activity]   


 


Blood Pressure 157/83 H 153/86 H 





[Left Brachial   





artery]   


 


O2 Saturation 94 96 


 


O2 Saturation [   94





With Activity]   














  03/02/19





  11:22


 


Temperature 36.4 C L


 


Heart Rate [ 





Activity] 


 


Heart Rate [ 84





Brachial] 


 


Heart Rate [ 





Monitoring 





electrodes] 


 


Respiratory 20





Rate 


 


Blood Pressure 





[Activity] 


 


Blood Pressure 115/70





[Left Brachial 





artery] 


 


O2 Saturation 95


 


O2 Saturation [ 





With Activity] 








                                     Oxygen











O2 Source [With Activity]      Room air


 


O2 Source                      Room air














I&O (Last 24 Hrs): 





                          Intake and Output Totals x24h











 02/28/19 03/01/19 03/02/19





 23:59 23:59 23:59


 


Intake Total 2718.933 2448.833 970


 


Output Total 3835 2630 1425


 


Balance -1116.067 -181.167 -455











General: Alert, Oriented x3


HEENT: Mucous membr. moist/pink


Neck: Supple


Neuro: Non Focal


Cardiovascular: Regular rate, No murmurs


Respiratory: No respiratory distress, Other (Wet cough)


Abdomen: Soft, Other (ileostomy)


Extremities: Other (Trace edema mostly of hands)





- Results


Results: 





                               Laboratory Results











WBC  8.1 x10^3/uL (4.8-10.8)   03/01/19  08:09    


 


RBC  3.18 10^6/uL (4.70-6.10)  L  03/01/19  08:09    


 


Hgb  9.4 g/dL (14.0-18.0)  L  03/01/19  08:09    


 


Hct  28.0 % (42.0-52.0)  L  03/01/19  08:09    


 


MCV  88.2 fL (80.0-94.0)   03/01/19  08:09    


 


MCH  29.6 pg (27.0-31.0)   03/01/19  08:09    


 


MCHC  33.6 g/dL (32.0-36.0)   03/01/19  08:09    


 


RDW  15.6 % (12.0-15.0)  H  03/01/19  08:09    


 


Plt Count  256 10^3/uL (130-450)   03/01/19  08:09    


 


MPV  7.3 fL (7.4-11.4)  L  03/01/19  08:09    


 


Neut # (Auto)  6.9 10^3/uL (1.5-6.6)  H  03/01/19  08:09    


 


Lymph # (Auto)  0.6 10^3/uL (1.5-3.5)  L  03/01/19  08:09    


 


Mono # (Auto)  0.5 10^3/uL (0.0-1.0)   03/01/19  08:09    


 


Eos # (Auto)  0.0 10^3/uL (0.0-0.7)   03/01/19  08:09    


 


Baso # (Auto)  0.0 10^3/uL (0.0-0.1)   03/01/19  08:09    


 


Absolute Nucleated RBC  0.00 x10^3/uL  03/01/19  08:09    


 


Total Counted  100   02/25/19  10:59    


 


Band Neuts % (Manual)  7 % (0-10)  02/25/19  10:59    


 


Abnorm Lymph % (Manual)  0 %  02/25/19  10:59    


 


Nucleated RBC %  0.0 /100WBC  03/01/19  08:09    


 


Neutrophils # (Manual)  11.3 10^3/uL (1.5-6.6)  H  02/25/19  10:59    


 


Lymphocytes # (Manual)  0.5 10^3/uL (1.5-3.5)  L  02/25/19  10:59    


 


Monocytes # (Manual)  0.8 10^3/uL (0.0-1.0)   02/25/19  10:59    


 


Eosinophils # (Manual)  0.0 10^3/uL (0-0.7)   02/25/19  10:59    


 


Basophils # (Manual)  0.0 10^3/uL (0-0.1)   02/25/19  10:59    


 


Differential Comment  MANUAL DIFFERENTIAL   02/25/19  10:59    


 


Platelet Estimate  INCREASED (>450,000)  (NORMAL)   02/25/19  10:59    


 


Platelet Morphology  NORMAL APPEARANCE  (NORMAL)   02/25/19  10:59    


 


RBC Morph Micro Appear  NORMAL APPEARANCE  (NORMAL)   02/25/19  10:59    


 


PT  13.2 secs (9.9-12.6)  H  02/27/19  05:00    


 


INR  1.2  (0.8-1.2)   02/27/19  05:00    


 


Bld Gas Analysis Time  1115   02/27/19  11:15    


 


Sample Site  A-LINE   02/27/19  11:15    


 


ABG pH  7.53  (7.35-7.45)  H  02/27/19  11:15    


 


ABG pCO2  30 mmHg (34-45)  L  02/27/19  11:15    


 


ABG pO2  87 mmHg ()   02/27/19  11:15    


 


ABG HCO3  23.8 mmol/L (22.0-26.0)   02/27/19  11:15    


 


ABG Total CO2  24.7 MMOL/L (21.0-29.0)   02/27/19  11:15    


 


ABG O2 Saturation  96 % (94-98)   02/27/19  11:15    


 


ABG Base Excess  1.3 mmol/L (-2.0-3.0)   02/27/19  11:15    


 


Shaq Test  NOT APPLICABLE   02/27/19  11:15    


 


VBG pH  7.459  (7.31-7.41)  H  02/28/19  05:05    


 


Ionized Calcium  1.08 mmol/L (1.15-1.33)  L  02/28/19  05:05    


 


Respiration Rate  18 b/min  02/27/19  11:15    


 


O2 Delivery Device  VENTILATOR   02/27/19  11:15    


 


Vent Mode  CPAP   02/27/19  11:15    


 


FiO2  0.35   02/27/19  11:15    


 


Tidal Volume  470 mL  02/27/19  11:15    


 


PEEP  5 cmH2O  02/27/19  11:15    


 


Pressure Support Vent  10 cmH2O  02/27/19  11:15    


 


Sodium  138 mmol/L (135-145)   03/01/19  05:00    


 


Potassium  3.8 mmol/L (3.5-5.0)   03/01/19  05:00    


 


Chloride  107 mmol/L (101-111)   03/01/19  05:00    


 


Carbon Dioxide  24 mmol/L (21-32)   03/01/19  05:00    


 


Anion Gap  7.0  (6-13)   03/01/19  05:00    


 


BUN  19 mg/dL (6-20)   03/01/19  05:00    


 


Creatinine  0.9 mg/dL (0.6-1.2)   03/01/19  05:00    


 


Estimated GFR (MDRD)  81  (>89)  L  03/01/19  05:00    


 


Glucose  132 mg/dL ()  H  03/01/19  05:00    


 


POC Whole Bld Glucose  148 mg/dL (70 - 100)  H  03/02/19  11:13    


 


Lactic Acid  1.4 mmol/L (0.5-2.2)   02/25/19  16:35    


 


Calcium  7.4 mg/dL (8.5-10.3)  L  03/01/19  05:00    


 


Ionized Calcium  YES   02/28/19  05:05    


 


Phosphorus  2.9 mg/dL (2.5-4.6)   03/01/19  05:00    


 


Magnesium  2.2 mg/dL (1.7-2.8)   03/01/19  05:00    


 


Total Bilirubin  0.5 mg/dL (0.2-1.0)   03/01/19  05:00    


 


AST  16 IU/L (10-42)   03/01/19  05:00    


 


ALT  10 IU/L (10-60)   03/01/19  05:00    


 


Alkaline Phosphatase  65 IU/L ()   03/01/19  05:00    


 


Total Protein  4.6 g/dL (6.7-8.2)  L  03/01/19  05:00    


 


Albumin  1.5 g/dL (3.2-5.5)  L  03/01/19  05:00    


 


Globulin  3.1 g/dL (2.1-4.2)   03/01/19  05:00    


 


Albumin/Globulin Ratio  0.5  (1.0-2.2)  L  03/01/19  05:00    


 


Prealbumin  3 mg/dL (18-45)  L  02/27/19  05:00    


 


Triglycerides  124 mg/dL (-149)  03/01/19  05:00    


 


Lipase  22 U/L (22-51)   02/25/19  10:59    


 


Urine Color  YELLOW   02/25/19  21:10    


 


Urine Clarity  CLEAR  (CLEAR)   02/25/19  21:10    


 


Urine pH  5.0 PH (5.0-7.5)   02/25/19  21:10    


 


Ur Specific Gravity  1.015  (1.002-1.030)   02/25/19  21:10    


 


Urine Protein  30 mg/dL (NEGATIVE)  H  02/25/19  21:10    


 


Urine Glucose (UA)  NEGATIVE mg/dL (NEGATIVE)   02/25/19  21:10    


 


Urine Ketones  TRACE mg/dL (NEGATIVE)   02/25/19  21:10    


 


Urine Occult Blood  TRACE-LYSE  (NEGATIVE)   02/25/19  21:10    


 


Urine Nitrite  NEGATIVE  (NEGATIVE)   02/25/19  21:10    


 


Urine Bilirubin  NEGATIVE  (NEGATIVE)   02/25/19  21:10    


 


Urine Urobilinogen  0.2 (NORMAL) E.U./dL (NORMAL)   02/25/19  21:10    


 


Ur Leukocyte Esterase  NEGATIVE  (NEGATIVE)   02/25/19  21:10    


 


Urine RBC  0-5 /HPF (0-5)   02/25/19  21:10    


 


Urine WBC  0-3 /HPF (0-3)   02/25/19  21:10    


 


Ur Epithelial Cells  FEW Transitional /HPF (<= Few)   02/25/19  21:10    


 


Ur Squamous Epith Cells  NONE SEEN  (<= Few)   02/25/19  21:10    


 


Urine Bacteria  None Seen /HPF (None Seen)   02/25/19  21:10    


 


Ur Microscopic Review  INDICATED   02/25/19  21:10    


 


Urine Culture Comments  NOT INDICATED   02/25/19  21:10    


 


MRSA Surveill Initial  NEGATIVE  (NEGATIVE)   02/25/19  14:43    


 


Blood Type  O POSITIVE   02/25/19  15:57    


 


Antibody Screen  NEGATIVE   02/25/19  15:57    


 


Crossmatch IS Only  See Detail   02/25/19  15:57    














- Procedures


Procedures: 





Procedures





INCIS W REM OF FORIEGN BODY OR DEV FROM SKIN & SUBCUT TISSUE (05/30/14)


RELEASE SMALL INTESTINE, OPEN APPROACH (02/15/19)


REPAIR SMALL INTESTINE, OPEN APPROACH (02/15/19)


RESECTION OF RIGHT LARGE INTESTINE, OPEN APPROACH (02/15/19)











Sepsis Event Note (H)





- Evaluation


Current Stage of Sepsis: Resolved (resolving)


Possible source of Sepsis: positive: GI tract/intra-abdominal





- Sepsis Criteria


Sepsis Criteria: Recorded Heart Rate greater than 90 bpm, MAP less than 65 mmHg,

SBP less than 90 mmHg, Metabolic: lactate > 2 mmol/L

## 2019-03-03 LAB
ANION GAP SERPL CALCULATED.4IONS-SCNC: 9 MMOL/L (ref 6–13)
BUN SERPL-MCNC: 28 MG/DL (ref 6–20)
CALCIUM UR-MCNC: 7.5 MG/DL (ref 8.5–10.3)
CHLORIDE SERPL-SCNC: 97 MMOL/L (ref 101–111)
CO2 SERPL-SCNC: 23 MMOL/L (ref 21–32)
CREAT SERPLBLD-SCNC: 0.9 MG/DL (ref 0.6–1.2)
GFRSERPLBLD MDRD-ARVRAT: 81 ML/MIN/{1.73_M2} (ref 89–?)
GLUCOSE SERPL-MCNC: 129 MG/DL (ref 70–100)
SODIUM SERPLBLD-SCNC: 129 MMOL/L (ref 135–145)

## 2019-03-03 RX ADMIN — ENOXAPARIN SODIUM SCH MG: 100 INJECTION SUBCUTANEOUS at 09:25

## 2019-03-03 RX ADMIN — SODIUM CHLORIDE, PRESERVATIVE FREE SCH: 5 INJECTION INTRAVENOUS at 09:26

## 2019-03-03 RX ADMIN — I.V. FAT EMULSION SCH MLS/HR: 20 EMULSION INTRAVENOUS at 19:28

## 2019-03-03 RX ADMIN — OXYCODONE PRN MG: 5 TABLET ORAL at 11:08

## 2019-03-03 RX ADMIN — PANTOPRAZOLE SODIUM SCH MG: 40 TABLET, DELAYED RELEASE ORAL at 06:37

## 2019-03-03 RX ADMIN — Medication SCH MG: at 09:25

## 2019-03-03 RX ADMIN — OXYCODONE PRN MG: 5 TABLET ORAL at 06:37

## 2019-03-03 RX ADMIN — OXYCODONE PRN MG: 5 TABLET ORAL at 00:02

## 2019-03-03 RX ADMIN — SODIUM CHLORIDE SCH MLS/HR: 9 INJECTION, SOLUTION INTRAVENOUS at 19:26

## 2019-03-03 RX ADMIN — SODIUM CHLORIDE, PRESERVATIVE FREE SCH: 5 INJECTION INTRAVENOUS at 17:44

## 2019-03-03 RX ADMIN — SODIUM CHLORIDE, PRESERVATIVE FREE SCH: 5 INJECTION INTRAVENOUS at 06:40

## 2019-03-03 RX ADMIN — TAMSULOSIN HYDROCHLORIDE SCH MG: 0.4 CAPSULE ORAL at 09:25

## 2019-03-03 RX ADMIN — OXYCODONE PRN MG: 5 TABLET ORAL at 21:33

## 2019-03-03 NOTE — PROVIDER PROGRESS NOTE
Assessment/Plan





- Problem List


(1) Volume overload


Assessment/Plan: 


The CXR yesterday, done for cough, showed bilateral pleural effusions.


The cough has resolved but he had wheezing today, when walking with PT.


He is many Liters pos in fluid balance during this admission, due to npo status 

and hypotension that was treated with crystalloid infusions.


He has gained 7 kg since admission til yesterday, then has dropped 2 kg with 

yesterday's Lasix diuresis.


Will give another Lasix iv dose today.


The tpn rate will be decreased and infusions stopped after today's.


Follow I's and O's and daily weight.








(2) Perforated abdominal viscus


Assessment/Plan: 


Diet is advancing without problems.


Ileostomy care teaching was started.


Will decrease tpn rate by half and stop both tpn and fat emulsion tomorrow.


Oral antibiotics were advised by vivian yesterday and he was taken off Pip/Gabriele 

and put on po Keflex and po Metronidazole.


If he tolerates these changes, he would be ready to be DCh to SNF tomorrow, 

Social Work was informed.








(3) Anemia


Assessment/Plan: 


Continue iron replacement.








(4) Generalized weakness


Assessment/Plan: 


Continue PT.


DCh possibly tomorrow to SNF for PT and OT rehab








(5) Urinary retention


Assessment/Plan: 


He had the Almeida out several days ago, but had urinary retention and it needed 

to be reinserted.


Flomax to start today then Almeida to be removed.








(6) Malnutrition following gastrointestinal surgery


Assessment/Plan: 


Diet is advancing without problems.


Will decrease tpn rate by half and stop both tpn and fat emulsion tomorrow.


If he tolerates these changes, he would be ready to be DCh to SNF tomorrow, 

Social Work was informed.








(7) Adenocarcinoma, intestinal type


Assessment/Plan: 


Outpt Oncol planned.








- Current Meds


Current Meds: 





                               Current Medications











Generic Name Dose Route Start Last Admin





  Trade Name Freq  PRN Reason Stop Dose Admin


 


Cephalexin  250 mg  03/02/19 22:00  03/03/19 06:37





  Keflex  PO   250 mg





  TID LILLIANA   Administration





     





     





     





     


 


Enoxaparin Sodium  40 mg  02/26/19 09:00  03/02/19 08:21





  Lovenox  SUBQ   40 mg





  DAILY LILLIANA   Administration





     





     





     





     


 


Ferrous Sulfate  300 mg  03/02/19 18:00  03/02/19 18:54





  Feosol Liquid  PO   300 mg





  DAILYWM LILLIANA   Administration





     





     





     





     


 


Fat Emulsion Intravenous  250 mls @ 21 mls/hr  02/26/19 19:00  03/03/19 06:57





  Intralipid 20%  IV  03/04/19 00:05  Infused





  1900 LILLIANA   Infusion





     





     





     





     


 


Multivitamins 10 ml/ Chromium/  2,011 mls @ 65 mls/hr  02/28/19 19:00  03/03/19 

06:58





Copper/Manganese/Seleni/Zn 1  IV   65 mls/hr





ml/ Amino Ac/Electrol/Dextrose  Q24H LILLIANA   Infusion





/Calcium     





     





     





     


 


Metronidazole  250 mg  03/02/19 22:00  03/03/19 06:37





  Flagyl  PO   250 mg





  TID LILLIANA   Administration





     





     





     





     


 


Mineral Oil  1 applic  03/01/19 13:59  03/01/19 14:17





  Cavilon  TOP   1 applic





  PRN PRN   Administration





  Skin Care   





     





     





     


 


Oxycodone HCl  2.5 mg  03/01/19 07:00  03/03/19 06:37





  Roxicodone  PO   2.5 mg





  Q4HR PRN   Administration





  PAIN   





     





     





     


 


Pantoprazole Sodium  40 mg  03/03/19 07:00  03/03/19 06:37





  Protonix  PO   40 mg





  QDAC LILLIANA   Administration





     





     





     





     


 


Sodium Chloride  10 ml  02/25/19 17:00  03/03/19 06:40





  Normal Saline Flush 0.9%  IVP   Not Given





  0100,0900,1700 [facility]   





     





     





     





     


 


Sodium Chloride  10 ml  02/25/19 12:38  03/02/19 04:19





  Normal Saline Flush 0.9%  IVP   10 ml





  PRN PRN   Administration





  AS NEEDED PER PROVIDER ORDERS   





     





     





     














- Lab Result


Fish Bone Diagrams: 


                                 03/01/19 08:09





                                 03/01/19 05:00





- Additional Planning


My Orders: 





My Active Orders





03/02/19 17:35


Miscellaenous Nursing Order [RC] QSHIFT 





03/02/19 18:00


Ferrous Sulfate Liquid [Feosol Liquid]   300 mg PO DAILYWM 





03/02/19 22:00


cephALEXin [Keflex]   250 mg PO TID 


metroNIDAZOLE [Flagyl]   250 mg PO TID 





03/02/19 Dinner


DIET [Dysphagia Puree Diet] [DIET] 





03/03/19


BMP - BASIC METABOLIC PANEL [CHEM] Routine 





03/03/19 08:00


Almeida Discontinuation [RC] ONCE 





03/03/19 09:00


Tamsulosin [Flomax]   0.4 mg PO DAILY 





03/03/19 Lunch


DIET [Regular Diet] [DIET] 





03/04/19 05:00


CBC - COMP BLD CT W/AUTO DIFF [HEME] DAILYLAB 


CMP [COMPREHENSIVE METABOLIC PANEL] [CHEM] DAILYLAB 














Subjective





- Subjective


Patient Reports: Resting Comfortably





Objective


Vital Signs: 





                               Vital Signs - 24 hr











  03/02/19 03/02/19 03/02/19





  11:04 11:22 15:49


 


Temperature  36.4 C L 36.6 C


 


Heart Rate [ 81  





Activity]   


 


Heart Rate [  84 84





Brachial]   


 


Respiratory  20 20





Rate   


 


Blood Pressure 153/86 H  





[Activity]   


 


Blood Pressure  115/70 138/79 H





[Left Brachial   





artery]   


 


O2 Saturation  95 99


 


O2 Saturation [ 94  





With Activity]   














  03/02/19 03/03/19





  23:51 07:48


 


Temperature 36.5 C 36.6 C


 


Heart Rate [  





Activity]  


 


Heart Rate [ 75 85





Brachial]  


 


Respiratory 16 18





Rate  


 


Blood Pressure  





[Activity]  


 


Blood Pressure 150/73 H 176/83 H





[Left Brachial  





artery]  


 


O2 Saturation 94 96


 


O2 Saturation [  





With Activity]  








                                     Oxygen











O2 Source [With Activity]      Room air


 


O2 Source                      Room air














I&O (Last 24 Hrs): 





                          Intake and Output Totals x24h











 03/01/19 03/02/19 03/03/19





 23:59 23:59 23:59


 


Intake Total 2448.833 3281.917 1133.75


 


Output Total 2630 3375 1625


 


Balance -181.167 -93.083 -491.25











General: Alert, Oriented x3


HEENT: Mucous membr. moist/pink


Neck: Supple


Neuro: Non Focal


Cardiovascular: Regular rate, No murmurs


Respiratory: No respiratory distress


Abdomen: Soft


Extremities: No edema





- Results


Results: 





                               Laboratory Results











WBC  8.1 x10^3/uL (4.8-10.8)   03/01/19  08:09    


 


RBC  3.18 10^6/uL (4.70-6.10)  L  03/01/19  08:09    


 


Hgb  9.4 g/dL (14.0-18.0)  L  03/01/19  08:09    


 


Hct  28.0 % (42.0-52.0)  L  03/01/19  08:09    


 


MCV  88.2 fL (80.0-94.0)   03/01/19  08:09    


 


MCH  29.6 pg (27.0-31.0)   03/01/19  08:09    


 


MCHC  33.6 g/dL (32.0-36.0)   03/01/19  08:09    


 


RDW  15.6 % (12.0-15.0)  H  03/01/19  08:09    


 


Plt Count  256 10^3/uL (130-450)   03/01/19  08:09    


 


MPV  7.3 fL (7.4-11.4)  L  03/01/19  08:09    


 


Neut # (Auto)  6.9 10^3/uL (1.5-6.6)  H  03/01/19  08:09    


 


Lymph # (Auto)  0.6 10^3/uL (1.5-3.5)  L  03/01/19  08:09    


 


Mono # (Auto)  0.5 10^3/uL (0.0-1.0)   03/01/19  08:09    


 


Eos # (Auto)  0.0 10^3/uL (0.0-0.7)   03/01/19  08:09    


 


Baso # (Auto)  0.0 10^3/uL (0.0-0.1)   03/01/19  08:09    


 


Absolute Nucleated RBC  0.00 x10^3/uL  03/01/19  08:09    


 


Total Counted  100   02/25/19  10:59    


 


Band Neuts % (Manual)  7 % (0-10)  02/25/19  10:59    


 


Abnorm Lymph % (Manual)  0 %  02/25/19  10:59    


 


Nucleated RBC %  0.0 /100WBC  03/01/19  08:09    


 


Neutrophils # (Manual)  11.3 10^3/uL (1.5-6.6)  H  02/25/19  10:59    


 


Lymphocytes # (Manual)  0.5 10^3/uL (1.5-3.5)  L  02/25/19  10:59    


 


Monocytes # (Manual)  0.8 10^3/uL (0.0-1.0)   02/25/19  10:59    


 


Eosinophils # (Manual)  0.0 10^3/uL (0-0.7)   02/25/19  10:59    


 


Basophils # (Manual)  0.0 10^3/uL (0-0.1)   02/25/19  10:59    


 


Differential Comment  MANUAL DIFFERENTIAL   02/25/19  10:59    


 


Platelet Estimate  INCREASED (>450,000)  (NORMAL)   02/25/19  10:59    


 


Platelet Morphology  NORMAL APPEARANCE  (NORMAL)   02/25/19  10:59    


 


RBC Morph Micro Appear  NORMAL APPEARANCE  (NORMAL)   02/25/19  10:59    


 


PT  13.2 secs (9.9-12.6)  H  02/27/19  05:00    


 


INR  1.2  (0.8-1.2)   02/27/19  05:00    


 


Bld Gas Analysis Time  1115   02/27/19  11:15    


 


Sample Site  A-LINE   02/27/19  11:15    


 


ABG pH  7.53  (7.35-7.45)  H  02/27/19  11:15    


 


ABG pCO2  30 mmHg (34-45)  L  02/27/19  11:15    


 


ABG pO2  87 mmHg ()   02/27/19  11:15    


 


ABG HCO3  23.8 mmol/L (22.0-26.0)   02/27/19  11:15    


 


ABG Total CO2  24.7 MMOL/L (21.0-29.0)   02/27/19  11:15    


 


ABG O2 Saturation  96 % (94-98)   02/27/19  11:15    


 


ABG Base Excess  1.3 mmol/L (-2.0-3.0)   02/27/19  11:15    


 


Shaq Test  NOT APPLICABLE   02/27/19  11:15    


 


VBG pH  7.459  (7.31-7.41)  H  02/28/19  05:05    


 


Ionized Calcium  1.08 mmol/L (1.15-1.33)  L  02/28/19  05:05    


 


Respiration Rate  18 b/min  02/27/19  11:15    


 


O2 Delivery Device  VENTILATOR   02/27/19  11:15    


 


Vent Mode  CPAP   02/27/19  11:15    


 


FiO2  0.35   02/27/19  11:15    


 


Tidal Volume  470 mL  02/27/19  11:15    


 


PEEP  5 cmH2O  02/27/19  11:15    


 


Pressure Support Vent  10 cmH2O  02/27/19  11:15    


 


Sodium  138 mmol/L (135-145)   03/01/19  05:00    


 


Potassium  3.8 mmol/L (3.5-5.0)   03/01/19  05:00    


 


Chloride  107 mmol/L (101-111)   03/01/19  05:00    


 


Carbon Dioxide  24 mmol/L (21-32)   03/01/19  05:00    


 


Anion Gap  7.0  (6-13)   03/01/19  05:00    


 


BUN  19 mg/dL (6-20)   03/01/19  05:00    


 


Creatinine  0.9 mg/dL (0.6-1.2)   03/01/19  05:00    


 


Estimated GFR (MDRD)  81  (>89)  L  03/01/19  05:00    


 


Glucose  132 mg/dL ()  H  03/01/19  05:00    


 


POC Whole Bld Glucose  148 mg/dL (70 - 100)  H  03/02/19  11:13    


 


Lactic Acid  1.4 mmol/L (0.5-2.2)   02/25/19  16:35    


 


Calcium  7.4 mg/dL (8.5-10.3)  L  03/01/19  05:00    


 


Ionized Calcium  YES   02/28/19  05:05    


 


Phosphorus  2.9 mg/dL (2.5-4.6)   03/01/19  05:00    


 


Magnesium  2.2 mg/dL (1.7-2.8)   03/01/19  05:00    


 


Total Bilirubin  0.5 mg/dL (0.2-1.0)   03/01/19  05:00    


 


AST  16 IU/L (10-42)   03/01/19  05:00    


 


ALT  10 IU/L (10-60)   03/01/19  05:00    


 


Alkaline Phosphatase  65 IU/L ()   03/01/19  05:00    


 


B-Natriuretic Peptide  199 pg/mL (5-100)  H  03/02/19  15:15    


 


Total Protein  4.6 g/dL (6.7-8.2)  L  03/01/19  05:00    


 


Albumin  1.5 g/dL (3.2-5.5)  L  03/01/19  05:00    


 


Globulin  3.1 g/dL (2.1-4.2)   03/01/19  05:00    


 


Albumin/Globulin Ratio  0.5  (1.0-2.2)  L  03/01/19  05:00    


 


Prealbumin  3 mg/dL (18-45)  L  02/27/19  05:00    


 


Triglycerides  124 mg/dL (-149)  03/01/19  05:00    


 


Lipase  22 U/L (22-51)   02/25/19  10:59    


 


Urine Color  YELLOW   02/25/19  21:10    


 


Urine Clarity  CLEAR  (CLEAR)   02/25/19  21:10    


 


Urine pH  5.0 PH (5.0-7.5)   02/25/19  21:10    


 


Ur Specific Gravity  1.015  (1.002-1.030)   02/25/19  21:10    


 


Urine Protein  30 mg/dL (NEGATIVE)  H  02/25/19  21:10    


 


Urine Glucose (UA)  NEGATIVE mg/dL (NEGATIVE)   02/25/19  21:10    


 


Urine Ketones  TRACE mg/dL (NEGATIVE)   02/25/19  21:10    


 


Urine Occult Blood  TRACE-LYSE  (NEGATIVE)   02/25/19  21:10    


 


Urine Nitrite  NEGATIVE  (NEGATIVE)   02/25/19  21:10    


 


Urine Bilirubin  NEGATIVE  (NEGATIVE)   02/25/19  21:10    


 


Urine Urobilinogen  0.2 (NORMAL) E.U./dL (NORMAL)   02/25/19  21:10    


 


Ur Leukocyte Esterase  NEGATIVE  (NEGATIVE)   02/25/19  21:10    


 


Urine RBC  0-5 /HPF (0-5)   02/25/19  21:10    


 


Urine WBC  0-3 /HPF (0-3)   02/25/19  21:10    


 


Ur Epithelial Cells  FEW Transitional /HPF (<= Few)   02/25/19  21:10    


 


Ur Squamous Epith Cells  NONE SEEN  (<= Few)   02/25/19  21:10    


 


Urine Bacteria  None Seen /HPF (None Seen)   02/25/19  21:10    


 


Ur Microscopic Review  INDICATED   02/25/19  21:10    


 


Urine Culture Comments  NOT INDICATED   02/25/19  21:10    


 


MRSA Surveill Initial  NEGATIVE  (NEGATIVE)   02/25/19  14:43    


 


Blood Type  O POSITIVE   02/25/19  15:57    


 


Antibody Screen  NEGATIVE   02/25/19  15:57    


 


Crossmatch IS Only  See Detail   02/25/19  15:57    














- Procedures


Procedures: 





Procedures





INCIS W REM OF FORIEGN BODY OR DEV FROM SKIN & SUBCUT TISSUE (05/30/14)


RELEASE SMALL INTESTINE, OPEN APPROACH (02/15/19)


REPAIR SMALL INTESTINE, OPEN APPROACH (02/15/19)


RESECTION OF RIGHT LARGE INTESTINE, OPEN APPROACH (02/15/19)











Sepsis Event Note (H)





- Evaluation


Current Stage of Sepsis: Resolved (resolving)


Possible source of Sepsis: positive: GI tract/intra-abdominal





- Sepsis Criteria


Sepsis Criteria: Recorded Heart Rate greater than 90 bpm, MAP less than 65 mmHg,

SBP less than 90 mmHg, Metabolic: lactate > 2 mmol/L

## 2019-03-04 LAB
ALBUMIN DIAFP-MCNC: 1.7 G/DL (ref 3.2–5.5)
ALBUMIN/GLOB SERPL: 0.5 {RATIO} (ref 1–2.2)
ALP SERPL-CCNC: 106 IU/L (ref 42–121)
ALT SERPL W P-5'-P-CCNC: 19 IU/L (ref 10–60)
ANION GAP SERPL CALCULATED.4IONS-SCNC: 9 MMOL/L (ref 6–13)
AST SERPL W P-5'-P-CCNC: 26 IU/L (ref 10–42)
BASOPHILS NFR BLD AUTO: 0 10^3/UL (ref 0–0.1)
BASOPHILS NFR BLD AUTO: 0.3 %
BILIRUB BLD-MCNC: 0.4 MG/DL (ref 0.2–1)
BUN SERPL-MCNC: 25 MG/DL (ref 6–20)
CALCIUM UR-MCNC: 7.7 MG/DL (ref 8.5–10.3)
CHLORIDE SERPL-SCNC: 99 MMOL/L (ref 101–111)
CO2 SERPL-SCNC: 22 MMOL/L (ref 21–32)
CREAT SERPLBLD-SCNC: 1 MG/DL (ref 0.6–1.2)
EOSINOPHIL # BLD AUTO: 0 10^3/UL (ref 0–0.7)
EOSINOPHIL NFR BLD AUTO: 0.2 %
ERYTHROCYTE [DISTWIDTH] IN BLOOD BY AUTOMATED COUNT: 15.5 % (ref 12–15)
GFRSERPLBLD MDRD-ARVRAT: 72 ML/MIN/{1.73_M2} (ref 89–?)
GLOBULIN SER-MCNC: 3.6 G/DL (ref 2.1–4.2)
GLUCOSE SERPL-MCNC: 120 MG/DL (ref 70–100)
HGB UR QL STRIP: 10.8 G/DL (ref 14–18)
LYMPHOCYTES # SPEC AUTO: 0.8 10^3/UL (ref 1.5–3.5)
LYMPHOCYTES NFR BLD AUTO: 5.9 %
MCH RBC QN AUTO: 29.1 PG (ref 27–31)
MCHC RBC AUTO-ENTMCNC: 32.6 G/DL (ref 32–36)
MCV RBC AUTO: 89.3 FL (ref 80–94)
MONOCYTES # BLD AUTO: 0.8 10^3/UL (ref 0–1)
MONOCYTES NFR BLD AUTO: 6.4 %
NEUTROPHILS # BLD AUTO: 11.3 10^3/UL (ref 1.5–6.6)
NEUTROPHILS # SNV AUTO: 13 X10^3/UL (ref 4.8–10.8)
NEUTROPHILS NFR BLD AUTO: 87.2 %
PDW BLD AUTO: 7.9 FL (ref 7.4–11.4)
PLATELET # BLD: 245 10^3/UL (ref 130–450)
PROT SPEC-MCNC: 5.3 G/DL (ref 6.7–8.2)
RBC MAR: 3.71 10^6/UL (ref 4.7–6.1)
SODIUM SERPLBLD-SCNC: 130 MMOL/L (ref 135–145)

## 2019-03-04 RX ADMIN — CIPROFLOXACIN SCH MG: 250 TABLET, FILM COATED ORAL at 20:24

## 2019-03-04 RX ADMIN — SODIUM CHLORIDE, PRESERVATIVE FREE SCH ML: 5 INJECTION INTRAVENOUS at 08:45

## 2019-03-04 RX ADMIN — Medication SCH MG: at 08:44

## 2019-03-04 RX ADMIN — ENOXAPARIN SODIUM SCH MG: 100 INJECTION SUBCUTANEOUS at 08:45

## 2019-03-04 RX ADMIN — ACETAMINOPHEN AND CODEINE PHOSPHATE PRN TAB: 300; 30 TABLET ORAL at 13:50

## 2019-03-04 RX ADMIN — ACETAMINOPHEN AND CODEINE PHOSPHATE PRN TAB: 300; 30 TABLET ORAL at 20:24

## 2019-03-04 RX ADMIN — THERA TABS SCH TAB: TAB at 08:44

## 2019-03-04 RX ADMIN — CYANOCOBALAMIN TAB 500 MCG SCH MCG: 500 TAB at 13:47

## 2019-03-04 RX ADMIN — TAMSULOSIN HYDROCHLORIDE SCH MG: 0.4 CAPSULE ORAL at 08:44

## 2019-03-04 RX ADMIN — SODIUM CHLORIDE, PRESERVATIVE FREE SCH: 5 INJECTION INTRAVENOUS at 05:26

## 2019-03-04 RX ADMIN — OXYCODONE PRN MG: 5 TABLET ORAL at 08:45

## 2019-03-04 RX ADMIN — CIPROFLOXACIN SCH MG: 250 TABLET, FILM COATED ORAL at 08:44

## 2019-03-04 RX ADMIN — VITAMIN D, TAB 1000IU (100/BT) SCH UNIT: 25 TAB at 13:47

## 2019-03-04 RX ADMIN — SODIUM CHLORIDE, PRESERVATIVE FREE SCH: 5 INJECTION INTRAVENOUS at 17:14

## 2019-03-04 RX ADMIN — PANTOPRAZOLE SODIUM SCH MG: 40 TABLET, DELAYED RELEASE ORAL at 06:31

## 2019-03-04 RX ADMIN — Medication SCH MG: at 17:21

## 2019-03-04 NOTE — PROVIDER PROGRESS NOTE
Subjective





- General


Admit Date: 02/25/19


Procedure Date: 02/25/19


Post Op Days: 7


Procedure Performed: Partial colectomy with end ileostomy, Hartmanns for 

perforation/sepsis





- Review of Systems


Wound/Incisions: positive: Healing well


General: positive: No symptoms (no n/v, dyspnea; minimal incisional pain, no 

foot pain), Appetite (poor, but still on TPN)


Pulmonary: positive: No symptoms


Cardiovascular: positive: No symptoms


Gastrointestinal: positive: Abdominal pain (Minimal to none.), Other (ileostomy 

functioning well).  negative: Nausea, Vomiting, Difficulty swallowing


Genitourinary: positive: Retention (required int cath; now on Flomax; voiding 

spont this am)


Skin: positive: No symptoms


Psychiatric: positive: No symptoms





Objective





- Patient Data


Reviewed Vital Signs: Yes


Vital Signs: 





                                Vital Signs x48h











  Temp Pulse Resp BP Pulse Ox


 


 03/04/19 07:40  36.5 C  77  22  159/81 H  96











Weight: 





                                     Weight











 03/02/19 03/03/19 03/04/19





 23:59 23:59 23:59


 


Weight (kg) 60 kg 57.5 kg 56.5 kg











Intake & Output: 





                          Intake and Output Totals x24h











 03/02/19 03/03/19 03/04/19





 23:59 23:59 23:59


 


Intake Total 3281.917 2704.083 1246.167


 


Output Total 3375 3845 660


 


Balance -93.083 -1140.917 586.167














- Lab Results


Lab Results: 


                                 03/04/19 06:15





                                 03/04/19 06:30


Other Lab Results: 





                               Lab Results x24hrs











  03/04/19 03/04/19 03/03/19 Range/Units





  06:30 06:15 18:15 


 


WBC   13.0 H   (4.8-10.8)  x10^3/uL


 


RBC   3.71 L   (4.70-6.10)  10^6/uL


 


Hgb   10.8 L   (14.0-18.0)  g/dL


 


Hct   33.1 L   (42.0-52.0)  %


 


MCV   89.3   (80.0-94.0)  fL


 


MCH   29.1   (27.0-31.0)  pg


 


MCHC   32.6   (32.0-36.0)  g/dL


 


RDW   15.5 H   (12.0-15.0)  %


 


Plt Count   245   (130-450)  10^3/uL


 


MPV   7.9   (7.4-11.4)  fL


 


Neut # (Auto)   11.3 H   (1.5-6.6)  10^3/uL


 


Lymph # (Auto)   0.8 L   (1.5-3.5)  10^3/uL


 


Mono # (Auto)   0.8   (0.0-1.0)  10^3/uL


 


Eos # (Auto)   0.0   (0.0-0.7)  10^3/uL


 


Baso # (Auto)   0.0   (0.0-0.1)  10^3/uL


 


Absolute Nucleated RBC   0.00   x10^3/uL


 


Nucleated RBC %   0.0   /100WBC


 


Sodium  130 L   129 L  (135-145)  mmol/L


 


Potassium  3.8   3.4 L  (3.5-5.0)  mmol/L


 


Chloride  99 L   97 L  (101-111)  mmol/L


 


Carbon Dioxide  22   23  (21-32)  mmol/L


 


Anion Gap  9.0   9.0  (6-13)  


 


BUN  25 H   28 H  (6-20)  mg/dL


 


Creatinine  1.0   0.9  (0.6-1.2)  mg/dL


 


Estimated GFR (MDRD)  72 L   81 L  (>89)  


 


Glucose  120 H   129 H  ()  mg/dL


 


Calcium  7.7 L   7.5 L  (8.5-10.3)  mg/dL


 


Total Bilirubin  0.4    (0.2-1.0)  mg/dL


 


AST  26    (10-42)  IU/L


 


ALT  19    (10-60)  IU/L


 


Alkaline Phosphatase  106    ()  IU/L


 


Total Protein  5.3 L    (6.7-8.2)  g/dL


 


Albumin  1.7 L    (3.2-5.5)  g/dL


 


Globulin  3.6    (2.1-4.2)  g/dL


 


Albumin/Globulin Ratio  0.5 L    (1.0-2.2)  














- Current Medications


Current Medications: 





                               Current Medications











Generic Name Dose Route Start Last Admin





  Trade Name Freq  PRN Reason Stop Dose Admin


 


Ciprofloxacin  500 mg  03/04/19 09:00  03/04/19 08:44





  Cipro  PO   500 mg





  BID LILLIANA   Administration





     





     





     





     


 


Enoxaparin Sodium  40 mg  02/26/19 09:00  03/04/19 08:45





  Lovenox  SUBQ   40 mg





  DAILY LILLIANA   Administration





     





     





     





     


 


Ferrous Sulfate  300 mg  03/02/19 18:00  03/04/19 08:44





  Feosol Liquid  PO   300 mg





  DAILYWM LILLIANA   Administration





     





     





     





     


 


Metronidazole  250 mg  03/02/19 22:00  03/04/19 05:25





  Flagyl  PO   250 mg





  TID LILLIANA   Administration





     





     





     





     


 


Mineral Oil  1 applic  03/01/19 13:59  03/01/19 14:17





  Cavilon  TOP   1 applic





  PRN PRN   Administration





  Skin Care   





     





     





     


 


Multivitamins  1 tab  03/04/19 08:00  03/04/19 08:44





  Theragran  PO   1 tab





  DAILYWM LILLIANA   Administration





     





     





     





     


 


Oxycodone HCl  2.5 mg  03/01/19 07:00  03/04/19 08:45





  Roxicodone  PO   2.5 mg





  Q4HR PRN   Administration





  PAIN   





     





     





     


 


Pantoprazole Sodium  40 mg  03/03/19 07:00  03/04/19 06:31





  Protonix  PO   40 mg





  QDAC LILLIANA   Administration





     





     





     





     


 


Saccharomyces Boulardii  500 mg  03/04/19 08:00  03/04/19 08:44





  Florastor  PO   500 mg





  BIDWM LILLIANA   Administration





     





     





     





     


 


Sodium Chloride  10 ml  02/25/19 17:00  03/04/19 08:45





  Normal Saline Flush 0.9%  IVP   10 ml





  0100,0900,1700 LILLIANA   Administration





     





     





     





     


 


Sodium Chloride  10 ml  02/25/19 12:38  03/02/19 04:19





  Normal Saline Flush 0.9%  IVP   10 ml





  PRN PRN   Administration





  AS NEEDED PER PROVIDER ORDERS   





     





     





     


 


Tamsulosin HCl  0.4 mg  03/03/19 09:00  03/04/19 08:44





  Flomax  PO   0.4 mg





  DAILY LILLIANA   Administration





     





     





     





     














- Physical Exam


Wound/Incisions: positive: Healing well


General Appearance: positive: No acute distress, Other (appears frail and weak)


Eyes Bilateral: positive: Conjunctivae nml, No scleral icterus


ENT: positive: ENT inspection nml, Dry mucous membranes


Neck: positive: No JVD


Respiratory: positive: Chest non-tender, No respiratory distress, Breath sounds 

nml.  negative: Wheezes, Rales, Rhonchi


Cardiovascular: positive: Regular rate & rhythm, No murmur, No gallop


Abdomen: positive: Non-tender, No organomegaly, Nml bowel sounds, No distention,

Other (ileostomy pink, viable, with liquid green output; midline incision 

healing well with several small open areas with early granulation tissue 

formation)


Skin: positive: Warm, Dry, Other (healing areas of ischemic skin left third toe)


Extremities: positive: No pedal edema.  negative: Calf tenderness


Neurologic/Psychiatric: positive: Oriented x3





ABX Reporting


Has patient been on IV antibiotics over the past 48 hours?: Yes





Impression/Plan





- Problem List


Problem List: 





PO Day 7 s/p partial colectomy, ileostomy for anastomotic leak; doing well 

overall. Gi tract function has returned, satisfactory cardiac/pulmonary/renal 

function. Nutritional status of concern, appears malnourished with very low 

albumin, but now tolerating a regular diet.


Plan: d/c tpn, encourage oral intake, complete 10 days of antibiotics, now oral;

PT/OT, will benefit from a period of rehab in SNF, and ostomy training.

## 2019-03-04 NOTE — PROVIDER PROGRESS NOTE
Assessment/Plan





- Problem List


(1) Volume overload


Assessment/Plan: 


He is many Liters pos in fluid balance during this admission, due to npo status 

and hypotension that was treated with crystalloid infusions when in the ICU.


The CXR done 2 days ago to evaluate the new cough, showed bilateral pleural 

effusions.


He gained 7 kg since admission, then has dropped 2 kg with first day of iv Lasix

diuresis and an additional 1 kg down with second Lasix iv given yesterday.


Since tpn and fat emulsion are ending, will plan no further Lasix.


Watch I's and O's and daily weight.








(2) Orthostatic hypotension


Assessment/Plan: 


PT reported a drop in BP from 150 supine to 100 sitting.


This may be due to the recent diuresis.


He may need compression stockings or Midodrine.


Will order postural VS checks qshift when awake.


This needs to be stabilized before he is DCh, to prevent syncope and falls.








(3) Perforated abdominal viscus


Assessment/Plan: 


Fat emulsion stopped, and today will be his last tpn.


Continue with Regular diet.


His antibiotics were changed from Keflex and Flagyl to Cipro and Flagyl.


Ileostomy care and teaching underway.


Getting a baseline serum B12 level.


He will now need vitamin B12 and vitamin D supplements.


Possible DCh tomorrow, he will need a SNF for rehab.








(4) Wound, open, face


Qualifiers: 


   Encounter type: initial encounter   Qualified Code(s): S01.80XA - Unspecified

open wound of other part of head, initial encounter   


Assessment/Plan: 


He was seen by BRAD Torres RN of [facility]. She cleaned and dressed the 

lesion of his R temporal area. It is very vascular and he needs a referral to 

Dermatology to determine the tissue type and further management.


Dressing left on.


Dressing should be changed every 3 days, I will place order.








(5) Anemia


Assessment/Plan: 


Pt on liquid Iron replacement.








(6) Generalized weakness


Assessment/Plan: 


He will need Dch to a SNF for rehab. He walks with a walker now, whereas his 

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 

used to be a horse jockey.








(7) Urinary retention


Assessment/Plan: 


Flomax was started several days ago.


Almeida has been removed yesterday (for the 2nd time this admission).


Monitor I's and O's and bladder scan if needed.


More upright posture will help this too.








(8) Malnutrition following gastrointestinal surgery


Assessment/Plan: 


Fat emulsion stopped, today will be his last tpn.


Continue with Regular diet.








(9) Adenocarcinoma, intestinal type


Assessment/Plan: 


Outpt Oncology management planned.








- Current Meds


Current Meds: 





                               Current Medications











Generic Name Dose Route Start Last Admin





  Trade Name Freq  PRN Reason Stop Dose Admin


 


Enoxaparin Sodium  40 mg  02/26/19 09:00  03/03/19 09:25





  Lovenox  SUBQ   40 mg





  DAILY LILLIANA   Administration





     





     





     





     


 


Ferrous Sulfate  300 mg  03/02/19 18:00  03/03/19 09:25





  Feosol Liquid  PO   300 mg





  DAILYWM LILLIANA   Administration





     





     





     





     


 


Metronidazole  250 mg  03/02/19 22:00  03/04/19 05:25





  Flagyl  PO   250 mg





  TID LILLIANA   Administration





     





     





     





     


 


Mineral Oil  1 applic  03/01/19 13:59  03/01/19 14:17





  Cavilon  TOP   1 applic





  PRN PRN   Administration





  S[facility]   





     





     





     


 


Oxycodone HCl  2.5 mg  03/01/19 07:00  03/03/19 21:33





  Roxicodone  PO   2.5 mg





  Q4HR PRN   Administration





  PAIN   





     





     





     


 


Pantoprazole Sodium  40 mg  03/03/19 07:00  03/04/19 06:31





  Protonix  PO   40 mg





  QDAC LILLIANA   Administration





     





     





     





     


 


Sodium Chloride  10 ml  02/25/19 17:00  03/04/19 05:26





  Normal Saline Flush 0.9%  IVP   Not Given





  0100,0900,1700 LILLIANA   





     





     





     





     


 


Sodium Chloride  10 ml  02/25/19 12:38  03/02/19 04:19





  Normal Saline Flush 0.9%  IVP   10 ml





  PRN PRN   Administration





  AS NEEDED PER PROVIDER ORDERS   





     





     





     


 


Tamsulosin HCl  0.4 mg  03/03/19 09:00  03/03/19 09:25





  Flomax  PO   0.4 mg





  DAILY LILLIANA   Administration





     





     





     





     














- Lab Result


Fish Bone Diagrams: 


                                 03/04/19 06:15





                                 03/04/19 06:30





- Additional Planning


My Orders: 





My Active Orders





03/03/19 09:00


Tamsulosin [Flomax]   0.4 mg PO DAILY 





03/03/19 Lunch


DIET [Regular Diet] [DIET] 














Objective


Vital Signs: 





                               Vital Signs - 24 hr











  03/03/19 03/04/19





  15:47 01:12


 


Temperature 36.6 C 37.0 C


 


Heart Rate [ 88 75





Brachial]  


 


Respiratory 20 18





Rate  


 


Blood Pressure 118/66 142/71 H





[Left Brachial  





artery]  


 


O2 Saturation 98 94








                                     Oxygen











O2 Source [With Activity]      Room air


 


O2 Source                      Room air














I&O (Last 24 Hrs): 





                          Intake and Output Totals x24h











 03/02/19 03/03/19 03/04/19





 23:59 23:59 23:59


 


Intake Total 3281.917 2704.083 1006.167


 


Output Total 3375 3845 460


 


Balance -93.083 -1140.917 546.167











General: Alert


HEENT: Mucous membr. moist/pink, Other (Dressing over R temple)


Neck: Supple, No JVD


Neuro: Non Focal


Cardiovascular: Regular rate, No murmurs


Respiratory: Breath sounds nml


Abdomen: Soft, Other (Ileostomy)


Extremities: Other (Hands and arms swollen)





- Results


Results: 





                               Laboratory Results











WBC  13.0 x10^3/uL (4.8-10.8)  H  03/04/19  06:15    


 


RBC  3.71 10^6/uL (4.70-6.10)  L  03/04/19  06:15    


 


Hgb  10.8 g/dL (14.0-18.0)  L  03/04/19  06:15    


 


Hct  33.1 % (42.0-52.0)  L  03/04/19  06:15    


 


MCV  89.3 fL (80.0-94.0)   03/04/19  06:15    


 


MCH  29.1 pg (27.0-31.0)   03/04/19  06:15    


 


MCHC  32.6 g/dL (32.0-36.0)   03/04/19  06:15    


 


RDW  15.5 % (12.0-15.0)  H  03/04/19  06:15    


 


Plt Count  245 10^3/uL (130-450)   03/04/19  06:15    


 


MPV  7.9 fL (7.4-11.4)   03/04/19  06:15    


 


Neut # (Auto)  11.3 10^3/uL (1.5-6.6)  H  03/04/19  06:15    


 


Lymph # (Auto)  0.8 10^3/uL (1.5-3.5)  L  03/04/19  06:15    


 


Mono # (Auto)  0.8 10^3/uL (0.0-1.0)   03/04/19  06:15    


 


Eos # (Auto)  0.0 10^3/uL (0.0-0.7)   03/04/19  06:15    


 


Baso # (Auto)  0.0 10^3/uL (0.0-0.1)   03/04/19  06:15    


 


Absolute Nucleated RBC  0.00 x10^3/uL  03/04/19  06:15    


 


Total Counted  100   02/25/19  10:59    


 


Band Neuts % (Manual)  7 % (0-10)  02/25/19  10:59    


 


Abnorm Lymph % (Manual)  0 %  02/25/19  10:59    


 


Nucleated RBC %  0.0 /100WBC  03/04/19  06:15    


 


Neutrophils # (Manual)  11.3 10^3/uL (1.5-6.6)  H  02/25/19  10:59    


 


Lymphocytes # (Manual)  0.5 10^3/uL (1.5-3.5)  L  02/25/19  10:59    


 


Monocytes # (Manual)  0.8 10^3/uL (0.0-1.0)   02/25/19  10:59    


 


Eosinophils # (Manual)  0.0 10^3/uL (0-0.7)   02/25/19  10:59    


 


Basophils # (Manual)  0.0 10^3/uL (0-0.1)   02/25/19  10:59    


 


Differential Comment  MANUAL DIFFERENTIAL   02/25/19  10:59    


 


Platelet Estimate  INCREASED (>450,000)  (NORMAL)   02/25/19  10:59    


 


Platelet Morphology  NORMAL APPEARANCE  (NORMAL)   02/25/19  10:59    


 


RBC Morph Micro Appear  NORMAL APPEARANCE  (NORMAL)   02/25/19  10:59    


 


PT  13.2 secs (9.9-12.6)  H  02/27/19  05:00    


 


INR  1.2  (0.8-1.2)   02/27/19  05:00    


 


Bld Gas Analysis Time  1115   02/27/19  11:15    


 


Sample Site  A-LINE   02/27/19  11:15    


 


ABG pH  7.53  (7.35-7.45)  H  02/27/19  11:15    


 


ABG pCO2  30 mmHg (34-45)  L  02/27/19  11:15    


 


ABG pO2  87 mmHg ()   02/27/19  11:15    


 


ABG HCO3  23.8 mmol/L (22.0-26.0)   02/27/19  11:15    


 


ABG Total CO2  24.7 MMOL/L (21.0-29.0)   02/27/19  11:15    


 


ABG O2 Saturation  96 % (94-98)   02/27/19  11:15    


 


ABG Base Excess  1.3 mmol/L (-2.0-3.0)   02/27/19  11:15    


 


Shaq Test  NOT APPLICABLE   02/27/19  11:15    


 


VBG pH  7.459  (7.31-7.41)  H  02/28/19  05:05    


 


Ionized Calcium  1.08 mmol/L (1.15-1.33)  L  02/28/19  05:05    


 


Respiration Rate  18 b/min  02/27/19  11:15    


 


O2 Delivery Device  VENTILATOR   02/27/19  11:15    


 


Vent Mode  CPAP   02/27/19  11:15    


 


FiO2  0.35   02/27/19  11:15    


 


Tidal Volume  470 mL  02/27/19  11:15    


 


PEEP  5 cmH2O  02/27/19  11:15    


 


Pressure Support Vent  10 cmH2O  02/27/19  11:15    


 


Sodium  130 mmol/L (135-145)  L  03/04/19  06:30    


 


Potassium  3.8 mmol/L (3.5-5.0)   03/04/19  06:30    


 


Chloride  99 mmol/L (101-111)  L  03/04/19  06:30    


 


Carbon Dioxide  22 mmol/L (21-32)   03/04/19  06:30    


 


Anion Gap  9.0  (6-13)   03/04/19  06:30    


 


BUN  25 mg/dL (6-20)  H  03/04/19  06:30    


 


Creatinine  1.0 mg/dL (0.6-1.2)   03/04/19  06:30    


 


Estimated GFR (MDRD)  72  (>89)  L  03/04/19  06:30    


 


Glucose  120 mg/dL ()  H  03/04/19  06:30    


 


POC Whole Bld Glucose  148 mg/dL (70 - 100)  H  03/02/19  11:13    


 


Lactic Acid  1.4 mmol/L (0.5-2.2)   02/25/19  16:35    


 


Calcium  7.7 mg/dL (8.5-10.3)  L  03/04/19  06:30    


 


Ionized Calcium  YES   02/28/19  05:05    


 


Phosphorus  2.9 mg/dL (2.5-4.6)   03/01/19  05:00    


 


Magnesium  2.2 mg/dL (1.7-2.8)   03/01/19  05:00    


 


Total Bilirubin  0.4 mg/dL (0.2-1.0)   03/04/19  06:30    


 


AST  26 IU/L (10-42)   03/04/19  06:30    


 


ALT  19 IU/L (10-60)   03/04/19  06:30    


 


Alkaline Phosphatase  106 IU/L ()   03/04/19  06:30    


 


B-Natriuretic Peptide  199 pg/mL (5-100)  H  03/02/19  15:15    


 


Total Protein  5.3 g/dL (6.7-8.2)  L  03/04/19  06:30    


 


Albumin  1.7 g/dL (3.2-5.5)  L  03/04/19  06:30    


 


Globulin  3.6 g/dL (2.1-4.2)   03/04/19  06:30    


 


Albumin/Globulin Ratio  0.5  (1.0-2.2)  L  03/04/19  06:30    


 


Prealbumin  3 mg/dL (18-45)  L  02/27/19  05:00    


 


Triglycerides  124 mg/dL (-149)  03/01/19  05:00    


 


Lipase  22 U/L (22-51)   02/25/19  10:59    


 


Urine Color  YELLOW   02/25/19  21:10    


 


Urine Clarity  CLEAR  (CLEAR)   02/25/19  21:10    


 


Urine pH  5.0 PH (5.0-7.5)   02/25/19  21:10    


 


Ur Specific Gravity  1.015  (1.002-1.030)   02/25/19  21:10    


 


Urine Protein  30 mg/dL (NEGATIVE)  H  02/25/19  21:10    


 


Urine Glucose (UA)  NEGATIVE mg/dL (NEGATIVE)   02/25/19  21:10    


 


Urine Ketones  TRACE mg/dL (NEGATIVE)   02/25/19  21:10    


 


Urine Occult Blood  TRACE-LYSE  (NEGATIVE)   02/25/19  21:10    


 


Urine Nitrite  NEGATIVE  (NEGATIVE)   02/25/19  21:10    


 


Urine Bilirubin  NEGATIVE  (NEGATIVE)   02/25/19  21:10    


 


Urine Urobilinogen  0.2 (NORMAL) E.U./dL (NORMAL)   02/25/19  21:10    


 


Ur Leukocyte Esterase  NEGATIVE  (NEGATIVE)   02/25/19  21:10    


 


Urine RBC  0-5 /HPF (0-5)   02/25/19  21:10    


 


Urine WBC  0-3 /HPF (0-3)   02/25/19  21:10    


 


Ur Epithelial Cells  FEW Transitional /HPF (<= Few)   02/25/19  21:10    


 


Ur Squamous Epith Cells  NONE SEEN  (<= Few)   02/25/19  21:10    


 


Urine Bacteria  None Seen /HPF (None Seen)   02/25/19  21:10    


 


Ur Microscopic Review  INDICATED   02/25/19  21:10    


 


Urine Culture Comments  NOT INDICATED   02/25/19  21:10    


 


MRSA Surveill Initial  NEGATIVE  (NEGATIVE)   02/25/19  14:43    


 


Blood Type  O POSITIVE   02/25/19  15:57    


 


Antibody Screen  NEGATIVE   02/25/19  15:57    


 


Crossmatch IS Only  See Detail   02/25/19  15:57    














- Procedures


Procedures: 





Procedures





INCIS W REM OF FORIEGN BODY OR DEV FROM SKIN & SUBCUT TISSUE (05/30/14)


RELEASE SMALL INTESTINE, OPEN APPROACH (02/15/19)


REPAIR SMALL INTESTINE, OPEN APPROACH (02/15/19)


RESECTION OF RIGHT LARGE INTESTINE, OPEN APPROACH (02/15/19)











Sepsis Event Note (H)





- Evaluation


Current Stage of Sepsis: Resolved (resolving)


Possible source of Sepsis: positive: GI tract/intra-abdominal





- Sepsis Criteria


Sepsis Criteria: Recorded Heart Rate greater than 90 bpm, MAP less than 65 mmHg,

SBP less than 90 mmHg, Metabolic: lactate > 2 mmol/L

## 2019-03-05 VITALS — SYSTOLIC BLOOD PRESSURE: 140 MMHG | DIASTOLIC BLOOD PRESSURE: 73 MMHG

## 2019-03-05 LAB
ALBUMIN DIAFP-MCNC: 1.8 G/DL (ref 3.2–5.5)
ALBUMIN/GLOB SERPL: 0.5 {RATIO} (ref 1–2.2)
ALP SERPL-CCNC: 115 IU/L (ref 42–121)
ALT SERPL W P-5'-P-CCNC: 21 IU/L (ref 10–60)
ANION GAP SERPL CALCULATED.4IONS-SCNC: 12 MMOL/L (ref 6–13)
AST SERPL W P-5'-P-CCNC: 30 IU/L (ref 10–42)
BASOPHILS NFR BLD AUTO: 0.1 10^3/UL (ref 0–0.1)
BASOPHILS NFR BLD AUTO: 0.9 %
BILIRUB BLD-MCNC: 1.2 MG/DL (ref 0.2–1)
BUN SERPL-MCNC: 25 MG/DL (ref 6–20)
CALCIUM UR-MCNC: 8.3 MG/DL (ref 8.5–10.3)
CHLORIDE SERPL-SCNC: 100 MMOL/L (ref 101–111)
CO2 SERPL-SCNC: 20 MMOL/L (ref 21–32)
CREAT SERPLBLD-SCNC: 0.9 MG/DL (ref 0.6–1.2)
EOSINOPHIL # BLD AUTO: 0.1 10^3/UL (ref 0–0.7)
EOSINOPHIL NFR BLD AUTO: 0.4 %
ERYTHROCYTE [DISTWIDTH] IN BLOOD BY AUTOMATED COUNT: 15.4 % (ref 12–15)
GFRSERPLBLD MDRD-ARVRAT: 81 ML/MIN/{1.73_M2} (ref 89–?)
GLOBULIN SER-MCNC: 4 G/DL (ref 2.1–4.2)
GLUCOSE SERPL-MCNC: 107 MG/DL (ref 70–100)
HGB UR QL STRIP: 10.9 G/DL (ref 14–18)
LYMPHOCYTES # SPEC AUTO: 0.6 10^3/UL (ref 1.5–3.5)
LYMPHOCYTES NFR BLD AUTO: 4.4 %
MCH RBC QN AUTO: 29.3 PG (ref 27–31)
MCHC RBC AUTO-ENTMCNC: 32.6 G/DL (ref 32–36)
MCV RBC AUTO: 89.9 FL (ref 80–94)
MONOCYTES # BLD AUTO: 0.8 10^3/UL (ref 0–1)
MONOCYTES NFR BLD AUTO: 6 %
NEUTROPHILS # BLD AUTO: 11.3 10^3/UL (ref 1.5–6.6)
NEUTROPHILS # SNV AUTO: 12.7 X10^3/UL (ref 4.8–10.8)
NEUTROPHILS NFR BLD AUTO: 88.3 %
PDW BLD AUTO: 8 FL (ref 7.4–11.4)
PLATELET # BLD: 337 10^3/UL (ref 130–450)
PROT SPEC-MCNC: 5.8 G/DL (ref 6.7–8.2)
RBC MAR: 3.71 10^6/UL (ref 4.7–6.1)
SODIUM SERPLBLD-SCNC: 132 MMOL/L (ref 135–145)

## 2019-03-05 RX ADMIN — CYANOCOBALAMIN TAB 500 MCG SCH MCG: 500 TAB at 11:17

## 2019-03-05 RX ADMIN — THERA TABS SCH TAB: TAB at 11:17

## 2019-03-05 RX ADMIN — OXYCODONE PRN MG: 5 TABLET ORAL at 11:16

## 2019-03-05 RX ADMIN — TAMSULOSIN HYDROCHLORIDE SCH MG: 0.4 CAPSULE ORAL at 11:15

## 2019-03-05 RX ADMIN — CIPROFLOXACIN SCH MG: 250 TABLET, FILM COATED ORAL at 11:17

## 2019-03-05 RX ADMIN — SODIUM CHLORIDE, PRESERVATIVE FREE PRN ML: 5 INJECTION INTRAVENOUS at 06:29

## 2019-03-05 RX ADMIN — Medication SCH MG: at 11:17

## 2019-03-05 RX ADMIN — SODIUM CHLORIDE, PRESERVATIVE FREE SCH: 5 INJECTION INTRAVENOUS at 05:29

## 2019-03-05 RX ADMIN — VITAMIN D, TAB 1000IU (100/BT) SCH UNIT: 25 TAB at 11:18

## 2019-03-05 RX ADMIN — ENOXAPARIN SODIUM SCH MG: 100 INJECTION SUBCUTANEOUS at 11:18

## 2019-03-05 RX ADMIN — SODIUM CHLORIDE, PRESERVATIVE FREE SCH: 5 INJECTION INTRAVENOUS at 11:19

## 2019-03-05 RX ADMIN — Medication SCH MG: at 11:15

## 2019-03-05 RX ADMIN — PANTOPRAZOLE SODIUM SCH MG: 40 TABLET, DELAYED RELEASE ORAL at 06:24

## 2019-03-05 NOTE — XRAY REPORT
Reason:  Bilateral pleural effusion

Procedure Date:  03/05/2019   

Accession Number:  [id number] / [id number]                    

Procedure:  XR  - Chest 2 View X-Ray CPT Code:  84789

 

FULL RESULT:

 

 

EXAM:

CHEST RADIOGRAPHY

 

EXAM DATE: 3/5/2019 08:04 AM.

 

CLINICAL HISTORY: Bilateral pleural effusion.

 

COMPARISON: CHEST 1 VIEW 03/02/2019 1:07 PM.

 

TECHNIQUE: 2 views.

 

FINDINGS:

Lungs/Pleura: Improving right basilar atelectasis. Mild left basilar 

atelectasis. Small greater than left bilateral effusions. Underlying 

hyperlucent lung fields. No pneumothorax.

 

Mediastinum: Heart size normal ectatic aorta

 

Other: Left jugular line mid SVC

IMPRESSION: Mild bibasilar atelectasis which is improved in the right 

lung base. Small right greater than left effusions

 

RADIA

## 2019-03-05 NOTE — PROVIDER PROGRESS NOTE
Subjective





- General


Admit Date: 02/25/19


Procedure Date: 02/25/19


Post Op Days: 8


Procedure Performed: Partial colectomy with end ileostomy, Hartmanns for 

perforation/sepsis





- Review of Systems


Wound/Incisions: positive: Dressing dry and intact


General: positive: No symptoms (no n/v, dyspnea; minimal incisional pain, no 

foot pain), Appetite (poor, but still on TPN)


Pulmonary: positive: No symptoms


Cardiovascular: positive: No symptoms


Gastrointestinal: positive: Abdominal pain (Minimal to none.), Other (ileostomy 

functioning well).  negative: Nausea, Vomiting, Difficulty swallowing


Genitourinary: positive: No symptoms


Musculoskeletal: positive: No symptoms


Skin: positive: No symptoms





Objective





- Patient Data


Reviewed Vital Signs: Yes


Vital Signs: 





                                Vital Signs x48h











  Temp Pulse Resp BP Pulse Ox


 


 03/05/19 08:16  36.3 C L  77  18  149/80 H  94


 


 03/05/19 00:45  36.5 C  72  16  153/81 H  92











Weight: 





                                     Weight











 03/03/19 03/04/19 03/05/19





 23:59 23:59 23:59


 


Weight (kg) 57.5 kg 56.5 kg 53.5 kg











Intake & Output: 





                          Intake and Output Totals x24h











 03/03/19 03/04/19 03/05/19





 23:59 23:59 23:59


 


Intake Total 2704.083 1726.167 785.833


 


Output Total 3845 1560 475


 


Balance -1140.917 166.167 310.833














- Lab Results


Lab Results: 


                                 03/04/19 06:15





                                 03/04/19 06:30


Other Lab Results: 





                               Lab Results x24hrs











  03/04/19 Range/Units





  10:52 


 


Vitamin B12  417  (180-914)  pg/mL














- Current Medications


Current Medications: 





                               Current Medications











Generic Name Dose Route Start Last Admin





  Trade Name Freq  PRN Reason Stop Dose Admin


 


Cholecalciferol  2,000 unit  03/04/19 11:00  03/04/19 13:47





  Vitamin D3  PO   2,000 unit





  DAILY LILLIANA   Administration





     





     





     





     


 


Ciprofloxacin  500 mg  03/04/19 09:00  03/04/19 20:24





  Cipro  PO   500 mg





  BID LILLIANA   Administration





     





     





     





     


 


Cyanocobalamin  500 mcg  03/04/19 11:00  03/04/19 13:47





  Vitamin B-12  PO   500 mcg





  DAILY LILLIANA   Administration





     





     





     





     


 


Enoxaparin Sodium  40 mg  02/26/19 09:00  03/04/19 08:45





  Lovenox  SUBQ   40 mg





  DAILY LILLIANA   Administration





     





     





     





     


 


Ferrous Sulfate  300 mg  03/02/19 18:00  03/04/19 08:44





  Feosol Liquid  PO   300 mg





  DAILYWM LILLIANA   Administration





     





     





     





     


 


Metronidazole  250 mg  03/02/19 22:00  03/05/19 06:25





  Flagyl  PO   250 mg





  TID LILLINAA   Administration





     





     





     





     


 


Mineral Oil  1 applic  03/01/19 13:59  03/01/19 14:17





  Cavilon  TOP   1 applic





  PRN PRN   Administration





  S[facility]   





     





     





     


 


Multivitamins  1 tab  03/04/19 08:00  03/04/19 08:44





  Theragran  PO   1 tab





  DAILYWM [facility]   Administration





     





     





     





     


 


Oxycodone HCl  2.5 mg  03/01/19 07:00  03/04/19 08:45





  Roxicodone  PO   2.5 mg





  Q4HR PRN   Administration





  PAIN   





     





     





     


 


Pantoprazole Sodium  40 mg  03/03/19 07:00  03/05/19 06:24





  Protonix  PO   40 mg





  QDAC [facility]   Administration





     





     





     





     


 


Saccharomyces Boulardii  500 mg  03/04/19 08:00  03/04/19 17:21





  Florastor  PO   500 mg





  BIDWM [facility]   Administration





     





     





     





     


 


Sodium Chloride  10 ml  02/25/19 17:00  03/05/19 05:29





  Normal Saline Flush 0.9%  IVP   Not Given





  0100,0900,1700 [facility]   





     





     





     





     


 


Sodium Chloride  10 ml  02/25/19 12:38  03/05/19 06:29





  Normal Saline Flush 0.9%  IVP   10 ml





  PRN PRN   Administration





  AS NEEDED PER PROVIDER ORDERS   





     





     





     


 


Tamsulosin HCl  0.4 mg  03/03/19 09:00  03/04/19 08:44





  Flomax  PO   0.4 mg





  DAILY [facility]   Administration





     





     





     





     














- Physical Exam


Wound/Incisions: positive: Dressing dry and intact


General Appearance: positive: No acute distress, Alert


Eyes Bilateral: positive: No scleral icterus


ENT: positive: ENT inspection nml


Neck: positive: No JVD


Respiratory: positive: Chest non-tender, No respiratory distress, Breath sounds 

nml


Cardiovascular: positive: Regular rate & rhythm, No murmur, No gallop


Abdomen: positive: Non-tender, No organomegaly, No distention, Other (ileostomy 

functioning well; viable)


Skin: positive: Color nml, No rash, Warm, Dry


Extremities: positive: No pedal edema.  negative: Calf tenderness


Neurologic/Psychiatric: positive: Oriented x3





ABX Reporting


Has patient been on IV antibiotics over the past 48 hours?: No





Impression/Plan





- Problem List


Problem List: 





PO Day 8 s/p partial colectomy, ileostomy, Goode's pouch for anastomotic leak.

Doing well overall. Rec: agree with transfer to SNF when bed available and pt 

stable from medical standpoint. Continue PT/OT, present wound care, regular 

diet, ostomy teaching/care; 2 more days of oral antibiotics to complete a 10 day

course for intra abdominal sepsis.

## 2019-03-05 NOTE — DISCHARGE SUMMARY
Discharge Summary


Admit Date: 02/25/19


Discharge Date: 03/05/19


Discharging Provider: Dr. Willard


Primary Care Provider: En Gupta


Code Status: Do Not Attempt Resuscitation


Condition at Discharge: Good


Discharge Disposition: 03 SNF DC/Xfer


Discharge Facility Name: [facility]





- DIAGNOSES


Admission Diagnoses: 


1. 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 

perforated viscus with anastomotic leak with generalized peritonitis s/p 

Exploratory laparotomy, partial colectomy with ileostomy and Goode's pouch c

onstruction.


2. Septic with early distributive shock


3. Acute metabolic acidosis


4. 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


5. HTN


6. Depression





Discharge Diagnoses with Status of Each Condition: 


(1) Septic shock; resolved 


(2) Metabolic Acidosis; resolved


(3) Volume overload; improved 


(4) Orthostatic hypotension; stable 


(5) Perforated abdominal viscus s/p Partial colectomy with end ileostomy, 

Hartmanns for perforation/sepsis


(6) Wound, open, face with underlying dermatoses; stable 


(7) Anemia; stable 


(8) Generalized weakness; improved 


(9) Urinary retention; stable 


(10) Malnutrition following gastrointestinal surgery; improved 


(11) Adenocarcinoma, intestinal type; stable


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








- HPI


History of Present Illness: 


82 yo male with sudden onset of severe generalized abdominal pain last night 

which awakened him from sleep. He was unable to get comfortable and presented to

the ER this morning for evaluation. He is 10 days s/p emergency right colectomy 

with ileo-transverse colonic anastomosis for perforated cecal carcinoma. He did 

well postop until last night, with good appetite, satisfactory bowel function, 

ambulating well until last night. The path from his recent colectomy revealed 

pT4N2 invasive adenocarcinoma. His recent surgery was thought to include an R0 

resection. 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 There has been gradual 

weight loss over the past several years; no recent fever/chills/night sweats.








- CONSULTS | PROCEDURES


Consultations: Dr. Niranjan Almeida 


Procedures: 


Partial colectomy with end ileostomy, Hartmanns for perforation/sepsis, POD#8








- HOSPITAL COURSE


Hospital Course: 


Patient with a hx of s/p emergency right colectomy with ileo-transverse colonic 

anastomosis for perforated cecal carcinoma, was admitted for a perforated viscus

with anastomosis leak with prior hx s/p remote partial small bowel resection for

small bowel lymphoma, Stage 4, The path from his recent colectomy revealed pT4N2

invasive adenocarcinoma. His recent surgery was thought to include an R0 

resection. Patient was in septic shock before and after Partial colectomy with 

end ileostomy, Hartmanns for perforation/sepsis, with elevated lactic acid, poor

MAP's and requiring up to 2 vasopressors for which CVP line placed and TPN was 

started post-operatively as well. Patient was treated with aggressive "early 

goal directed tx" and eventually was weaned off pressors and was successfully 

extubated. He did have levophed-induced necrosis to toe and taken off tpn, as 

well as tolerating a full diet prior to discharge to [facility]. Patient will be 

referred to Oncology in 2-3 weeks for intestinal adenocarcinoma seen on pa

thology and with dermatology to see if patient has basal or squamous cell 

carcinoma to left facial demratoses site being treated by [facility] wound service. 

Patient to continue with post-op orders, PT/OT and overall care over at [facility]

and to see PCP in 1-2 weeks and to make necessary referrals to derm/onc services

in 2-3 weeks. Dr Almeida will be following up patient as scheduled. 











- ALLERGIES


Allergies/Adverse Reactions: 


                                    Allergies











Allergy/AdvReac Type Severity Reaction Status Date / Time


 


No Known Drug Allergies Allergy   Verified 02/15/19 00:18














- MEDICATIONS


Home Medications: 


                                Ambulatory Orders











 Medication  Instructions  Recorded  Confirmed


 


Aspirin 162 mg PO DAILY 07/10/13 02/26/19


 


Acetaminophen/Diphenhydramine 1 each PO QPM PRN 02/08/19 02/26/19





[Tylenol Pm Ex-Strength Caplet]   


 


Calcium Carbonate [Tums (Calcium 500 mg PO BID PRN  tablet 02/11/19 02/26/19





Carbonate 500mg)]   


 


Ondansetron HCl [Zofran] 4 mg PO Q8HR #10 tablet 02/11/19 02/26/19


 


amLODIPine [Norvasc] 5 mg PO DAILY #30 tablet 02/20/19 02/26/19


 


Cholecalciferol (Vitamin D3) 2,000 unit PO DAILY #30 capsule 03/05/19 





[Vitamin D]   


 


Ciprofloxacin HCl [Cipro] 500 mg PO BID 2 Days #4 tablet 03/05/19 


 


Cyanocobalamin (Vitamin B-12) 5,000 mcg PO DAILY #30 tab.rapdis 03/05/19 





[Vitamin B-12]   


 


Ferrous Sulfate 220 mg PO TID #450 ml 03/05/19 


 


L. Acidophilus/L.bulgaricus 1 each PO BID #60 tablet 03/05/19 





[Lactobacillus Tablet]   


 


Multivitamin [Theragran] 1 tab PO DAILYWM  tablet 03/05/19 


 


Pantoprazole [Protonix] 40 mg PO QDAC  tablet 03/05/19 


 


Tamsulosin [Flomax] 0.4 mg PO DAILY  capsule 03/05/19 


 


metroNIDAZOLE [Metronidazole] 500 mg PO BID 2 Days #4 tablet 03/05/19 


 


oxyCODONE [Roxicodone] 2.5 mg PO Q4HR PRN #30 tablet 03/05/19 














- LABS


Result Diagrams: 


                                 03/05/19 08:10





                                 03/04/19 06:30





- SEPSIS


Current Stage of Sepsis: Resolved (resolving)


Possible source of Sepsis: GI tract/intra-abdominal


Sepsis Criteria: Recorded Heart Rate greater than 90 bpm, MAP less than 65 mmHg,

 SBP less than 90 mmHg, Metabolic: lactate > 2 mmol/L

## 2019-03-05 NOTE — DISCHARGE PLAN
Discharge Plan for SNF / [facility]





- Discharge Plan And Transition Orders


Disposition: 03 SNF DC/Xfer


Condition: Good


Allergies and Adverse Reactions: 


                                    Allergies











Allergy/AdvReac Type Severity Reaction Status Date / Time


 


No Known Drug Allergies Allergy   Verified 02/15/19 00:18














- SNF / correction Transition Orders


Admit to (Facility): [facility] 


Under the care of (Name): Attending MD


Discharge Diagnosis: 


(1) Volume overload; improved 


2) Orthostatic hypotension; stable 


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 

[place] for perforation/sepsis


(4) Wound, open, face with underlying dermatoses; stable 


(5) Anemia; stable 


(6) Generalized weakness; improved 


(7) Urinary retention; stable 


(8) Malnutrition following gastrointestinal surgery; improved 


(9) Adenocarcinoma, intestinal type; stable


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








Medicare Certification Statement: 


I certify that [facility] skilled nursing care is medically necessary on a 

continuing basis for any of the conditions for which she/he is receiving care 

during hospitalization.





Notify PCP of admission and forward orders to primary provider for signature.





Weight on admission and: Daily


Other Notification Orders: 


Call PCP immediately if patient develops dyspnea, chest pain/tightness or edema.





House Bowel Program: Yes


Additional Bowel Program Orders: 


If no BM after 2 days, nurse may give M.O.M. 30ml PO PRN and/or ducolax Supp 1 

NV and/or ELLA 250mg P.O., and/or senna 1-2 tabs PO.  On day 3 nurse may give 

repeat above order until residents constipation is resolved. 





Annual Influenza Vaccine (between Sept 1st and March 31st): Yes


Two-step PPD per [facility] -235 or approved exception documents: No


Treatments & Other Orders: Ostomy training. Bacitracin BID to surgial site. 

Xerofoam and gauze to left face dermatoses site to change site every 2 days.


Lab Tests or X-ray Orders: CBC and BMP weekly


Medication Orders: 





PLEASE REFER TO THE DISCHARGE MEDICATION LIST.








Insulin Orders?: No





- Medications


New Prescriptions: 


oxyCODONE [Roxicodone] 2.5 mg PO Q4HR PRN #30 tablet


 PRN Reason: Pain


Cholecalciferol (Vitamin D3) [Vitamin D] 2,000 unit PO DAILY #30 capsule


Ciprofloxacin HCl [Cipro] 500 mg PO BID 2 Days #4 tablet


Cyanocobalamin (Vitamin B-12) [Vitamin B-12] 5,000 mcg PO DAILY #30 tab.rapdis


Ferrous Sulfate 220 mg PO TID #450 ml


L. Acidophilus/L.bulgaricus [Lactobacillus Tablet] 1 each PO BID #60 tablet


metroNIDAZOLE [Metronidazole] 500 mg PO BID 2 Days #4 tablet





- Diet


Type: No added salt


Texture: Regular


Liquids: Thin


May have monthly special meal: Yes





- Therapies | Activity


Therapy: Evaluation | Treat if indicated: PT, OT


Rehabilitation Potential: Maximize functional status, Return to independent 

living, Maintain present ADL Functional


Activity: Activity as Tolerated


Weight Bearing: Full Weight


Additional Instructions: 





PLEASE REFER TO THE DISCHARGE MEDICATION LIST.





Parents counseled regarding signs of infection and how to minimize scarring.


Will have pt follow up with PCP for further care or return if pt worsens. Pt 

comfortable with plan.





Pt comfortable with plan and will return if he worsens. Patient to continue with

 post-op care, wound care per MAC to left dermatoses site as well as to keep 

surgical site clean and application of bacitracin BID. 


Follow Up: 


Patient should f/u with PCP in 1-2 weeks and have referrals placed to 

dermatology for left facial laceration with underlying skin dermatoses. 

Dermatology to see in 2-3 weeks and Oncology to see as first appointment in 2-3 

weeks to be sent as referrals from PCP.
